# Patient Record
Sex: FEMALE | Race: WHITE | NOT HISPANIC OR LATINO | URBAN - METROPOLITAN AREA
[De-identification: names, ages, dates, MRNs, and addresses within clinical notes are randomized per-mention and may not be internally consistent; named-entity substitution may affect disease eponyms.]

---

## 2017-01-15 ENCOUNTER — COMMUNICATION - HEALTHEAST (OUTPATIENT)
Dept: INTERNAL MEDICINE | Facility: CLINIC | Age: 82
End: 2017-01-15

## 2017-01-17 ENCOUNTER — COMMUNICATION - HEALTHEAST (OUTPATIENT)
Dept: INTERNAL MEDICINE | Facility: CLINIC | Age: 82
End: 2017-01-17

## 2017-01-30 ENCOUNTER — OFFICE VISIT - HEALTHEAST (OUTPATIENT)
Dept: INTERNAL MEDICINE | Facility: CLINIC | Age: 82
End: 2017-01-30

## 2017-01-30 DIAGNOSIS — I10 ESSENTIAL HYPERTENSION WITH GOAL BLOOD PRESSURE LESS THAN 140/90: ICD-10-CM

## 2017-01-30 DIAGNOSIS — D50.0 IRON DEFICIENCY ANEMIA DUE TO CHRONIC BLOOD LOSS: ICD-10-CM

## 2017-01-30 DIAGNOSIS — M79.7 FIBROMYALGIA: ICD-10-CM

## 2017-01-30 DIAGNOSIS — I48.91 ATRIAL FIBRILLATION WITH RVR (H): ICD-10-CM

## 2017-01-30 ASSESSMENT — MIFFLIN-ST. JEOR: SCORE: 967.47

## 2017-01-31 ENCOUNTER — COMMUNICATION - HEALTHEAST (OUTPATIENT)
Dept: INTERNAL MEDICINE | Facility: CLINIC | Age: 82
End: 2017-01-31

## 2017-02-14 ENCOUNTER — COMMUNICATION - HEALTHEAST (OUTPATIENT)
Dept: INTERNAL MEDICINE | Facility: CLINIC | Age: 82
End: 2017-02-14

## 2017-02-14 ENCOUNTER — AMBULATORY - HEALTHEAST (OUTPATIENT)
Dept: INTERNAL MEDICINE | Facility: CLINIC | Age: 82
End: 2017-02-14

## 2017-03-08 ENCOUNTER — COMMUNICATION - HEALTHEAST (OUTPATIENT)
Dept: INTERNAL MEDICINE | Facility: CLINIC | Age: 82
End: 2017-03-08

## 2017-03-20 ENCOUNTER — COMMUNICATION - HEALTHEAST (OUTPATIENT)
Dept: INTERNAL MEDICINE | Facility: CLINIC | Age: 82
End: 2017-03-20

## 2017-03-22 ENCOUNTER — COMMUNICATION - HEALTHEAST (OUTPATIENT)
Dept: INTERNAL MEDICINE | Facility: CLINIC | Age: 82
End: 2017-03-22

## 2017-03-22 ENCOUNTER — AMBULATORY - HEALTHEAST (OUTPATIENT)
Dept: INTERNAL MEDICINE | Facility: CLINIC | Age: 82
End: 2017-03-22

## 2017-03-28 ENCOUNTER — COMMUNICATION - HEALTHEAST (OUTPATIENT)
Dept: INTERNAL MEDICINE | Facility: CLINIC | Age: 82
End: 2017-03-28

## 2017-03-29 ENCOUNTER — OFFICE VISIT - HEALTHEAST (OUTPATIENT)
Dept: INTERNAL MEDICINE | Facility: CLINIC | Age: 82
End: 2017-03-29

## 2017-03-29 DIAGNOSIS — D50.0 IRON DEFICIENCY ANEMIA DUE TO CHRONIC BLOOD LOSS: ICD-10-CM

## 2017-03-29 DIAGNOSIS — I10 ESSENTIAL HYPERTENSION WITH GOAL BLOOD PRESSURE LESS THAN 140/90: ICD-10-CM

## 2017-03-29 DIAGNOSIS — K55.20 AV MALFORMATION OF GI TRACT: ICD-10-CM

## 2017-03-29 ASSESSMENT — MIFFLIN-ST. JEOR: SCORE: 972

## 2017-04-25 ENCOUNTER — COMMUNICATION - HEALTHEAST (OUTPATIENT)
Dept: INTERNAL MEDICINE | Facility: CLINIC | Age: 82
End: 2017-04-25

## 2017-04-25 ENCOUNTER — OFFICE VISIT - HEALTHEAST (OUTPATIENT)
Dept: INTERNAL MEDICINE | Facility: CLINIC | Age: 82
End: 2017-04-25

## 2017-04-25 DIAGNOSIS — M79.7 FIBROMYALGIA: ICD-10-CM

## 2017-04-25 DIAGNOSIS — J44.9 COPD (CHRONIC OBSTRUCTIVE PULMONARY DISEASE) (H): ICD-10-CM

## 2017-04-25 DIAGNOSIS — I10 ESSENTIAL HYPERTENSION WITH GOAL BLOOD PRESSURE LESS THAN 140/90: ICD-10-CM

## 2017-04-25 DIAGNOSIS — J44.9 CHRONIC OBSTRUCTIVE PULMONARY DISEASE, UNSPECIFIED COPD TYPE (H): ICD-10-CM

## 2017-04-25 DIAGNOSIS — K92.2 GASTROINTESTINAL HEMORRHAGE, UNSPECIFIED GASTROINTESTINAL HEMORRHAGE TYPE: ICD-10-CM

## 2017-04-25 ASSESSMENT — MIFFLIN-ST. JEOR: SCORE: 976.54

## 2017-04-27 ENCOUNTER — COMMUNICATION - HEALTHEAST (OUTPATIENT)
Dept: INTERNAL MEDICINE | Facility: CLINIC | Age: 82
End: 2017-04-27

## 2017-05-18 ENCOUNTER — COMMUNICATION - HEALTHEAST (OUTPATIENT)
Dept: INTERNAL MEDICINE | Facility: CLINIC | Age: 82
End: 2017-05-18

## 2017-05-30 ENCOUNTER — OFFICE VISIT - HEALTHEAST (OUTPATIENT)
Dept: INTERNAL MEDICINE | Facility: CLINIC | Age: 82
End: 2017-05-30

## 2017-05-30 DIAGNOSIS — D64.9 ANEMIA: ICD-10-CM

## 2017-05-30 DIAGNOSIS — J44.9 CHRONIC OBSTRUCTIVE PULMONARY DISEASE, UNSPECIFIED COPD TYPE (H): ICD-10-CM

## 2017-05-30 DIAGNOSIS — I10 ESSENTIAL HYPERTENSION WITH GOAL BLOOD PRESSURE LESS THAN 140/90: ICD-10-CM

## 2017-05-30 ASSESSMENT — MIFFLIN-ST. JEOR: SCORE: 976.54

## 2017-06-14 ENCOUNTER — COMMUNICATION - HEALTHEAST (OUTPATIENT)
Dept: INTERNAL MEDICINE | Facility: CLINIC | Age: 82
End: 2017-06-14

## 2017-06-14 DIAGNOSIS — M81.0 OSTEOPOROSIS: ICD-10-CM

## 2017-06-21 ENCOUNTER — RECORDS - HEALTHEAST (OUTPATIENT)
Dept: BONE DENSITY | Facility: CLINIC | Age: 82
End: 2017-06-21

## 2017-06-21 DIAGNOSIS — M81.0 AGE-RELATED OSTEOPOROSIS WITHOUT CURRENT PATHOLOGICAL FRACTURE: ICD-10-CM

## 2017-06-22 ENCOUNTER — RECORDS - HEALTHEAST (OUTPATIENT)
Dept: ADMINISTRATIVE | Facility: OTHER | Age: 82
End: 2017-06-22

## 2017-07-05 ENCOUNTER — COMMUNICATION - HEALTHEAST (OUTPATIENT)
Dept: INTERNAL MEDICINE | Facility: CLINIC | Age: 82
End: 2017-07-05

## 2017-07-13 ENCOUNTER — COMMUNICATION - HEALTHEAST (OUTPATIENT)
Dept: INTERNAL MEDICINE | Facility: CLINIC | Age: 82
End: 2017-07-13

## 2017-08-01 ENCOUNTER — OFFICE VISIT - HEALTHEAST (OUTPATIENT)
Dept: INTERNAL MEDICINE | Facility: CLINIC | Age: 82
End: 2017-08-01

## 2017-08-01 DIAGNOSIS — M79.7 FIBROMYALGIA: ICD-10-CM

## 2017-08-01 DIAGNOSIS — K55.20 AV MALFORMATION OF GI TRACT: ICD-10-CM

## 2017-08-01 DIAGNOSIS — I25.10 ASHD (ARTERIOSCLEROTIC HEART DISEASE): ICD-10-CM

## 2017-08-01 DIAGNOSIS — M81.0 OSTEOPOROSIS: ICD-10-CM

## 2017-08-01 LAB
CHOLEST SERPL-MCNC: 180 MG/DL
FASTING STATUS PATIENT QL REPORTED: YES
HDLC SERPL-MCNC: 75 MG/DL
LDLC SERPL CALC-MCNC: 88 MG/DL
TRIGL SERPL-MCNC: 84 MG/DL

## 2017-08-01 ASSESSMENT — MIFFLIN-ST. JEOR: SCORE: 958.39

## 2017-08-02 ENCOUNTER — COMMUNICATION - HEALTHEAST (OUTPATIENT)
Dept: INTERNAL MEDICINE | Facility: CLINIC | Age: 82
End: 2017-08-02

## 2017-08-21 ENCOUNTER — RECORDS - HEALTHEAST (OUTPATIENT)
Dept: ADMINISTRATIVE | Facility: OTHER | Age: 82
End: 2017-08-21

## 2017-10-03 ENCOUNTER — OFFICE VISIT - HEALTHEAST (OUTPATIENT)
Dept: INTERNAL MEDICINE | Facility: CLINIC | Age: 82
End: 2017-10-03

## 2017-10-03 DIAGNOSIS — Z23 NEED FOR VACCINATION: ICD-10-CM

## 2017-10-03 DIAGNOSIS — M79.7 FIBROMYALGIA: ICD-10-CM

## 2017-10-03 DIAGNOSIS — D64.9 ANEMIA: ICD-10-CM

## 2017-10-03 DIAGNOSIS — D50.0 IRON DEFICIENCY ANEMIA DUE TO CHRONIC BLOOD LOSS: ICD-10-CM

## 2017-10-03 DIAGNOSIS — J44.9 CHRONIC OBSTRUCTIVE PULMONARY DISEASE, UNSPECIFIED COPD TYPE (H): ICD-10-CM

## 2017-10-03 DIAGNOSIS — I25.10 CORONARY ATHEROSCLEROSIS: ICD-10-CM

## 2017-10-03 DIAGNOSIS — Z79.899 MEDICATION MANAGEMENT: ICD-10-CM

## 2017-10-03 ASSESSMENT — MIFFLIN-ST. JEOR: SCORE: 949.32

## 2017-10-04 ENCOUNTER — COMMUNICATION - HEALTHEAST (OUTPATIENT)
Dept: INTERNAL MEDICINE | Facility: CLINIC | Age: 82
End: 2017-10-04

## 2017-10-12 ENCOUNTER — COMMUNICATION - HEALTHEAST (OUTPATIENT)
Dept: INTERNAL MEDICINE | Facility: CLINIC | Age: 82
End: 2017-10-12

## 2017-10-12 DIAGNOSIS — M79.7 FIBROMYALGIA: ICD-10-CM

## 2017-10-25 ENCOUNTER — COMMUNICATION - HEALTHEAST (OUTPATIENT)
Dept: INTERNAL MEDICINE | Facility: CLINIC | Age: 82
End: 2017-10-25

## 2017-10-25 DIAGNOSIS — J44.9 CHRONIC OBSTRUCTIVE PULMONARY DISEASE, UNSPECIFIED COPD TYPE (H): ICD-10-CM

## 2017-11-28 ENCOUNTER — COMMUNICATION - HEALTHEAST (OUTPATIENT)
Dept: INTERNAL MEDICINE | Facility: CLINIC | Age: 82
End: 2017-11-28

## 2017-11-29 ENCOUNTER — AMBULATORY - HEALTHEAST (OUTPATIENT)
Dept: INTERNAL MEDICINE | Facility: CLINIC | Age: 82
End: 2017-11-29

## 2017-11-29 DIAGNOSIS — M79.7 FIBROMYALGIA: ICD-10-CM

## 2018-01-09 ENCOUNTER — OFFICE VISIT - HEALTHEAST (OUTPATIENT)
Dept: INTERNAL MEDICINE | Facility: CLINIC | Age: 83
End: 2018-01-09

## 2018-01-09 DIAGNOSIS — E03.4 HYPOTHYROIDISM DUE TO ACQUIRED ATROPHY OF THYROID: ICD-10-CM

## 2018-01-09 DIAGNOSIS — M79.7 FIBROMYALGIA: ICD-10-CM

## 2018-01-09 DIAGNOSIS — D50.0 IRON DEFICIENCY ANEMIA DUE TO CHRONIC BLOOD LOSS: ICD-10-CM

## 2018-01-09 DIAGNOSIS — I25.10 CORONARY ATHEROSCLEROSIS: ICD-10-CM

## 2018-01-09 DIAGNOSIS — I10 ESSENTIAL HYPERTENSION: ICD-10-CM

## 2018-01-09 LAB
ANION GAP SERPL CALCULATED.3IONS-SCNC: 13 MMOL/L (ref 5–18)
BUN SERPL-MCNC: 22 MG/DL (ref 8–28)
CALCIUM SERPL-MCNC: 9.6 MG/DL (ref 8.5–10.5)
CHLORIDE BLD-SCNC: 98 MMOL/L (ref 98–107)
CO2 SERPL-SCNC: 28 MMOL/L (ref 22–31)
CREAT SERPL-MCNC: 0.81 MG/DL (ref 0.6–1.1)
ERYTHROCYTE [DISTWIDTH] IN BLOOD BY AUTOMATED COUNT: 11.7 % (ref 11–14.5)
GFR SERPL CREATININE-BSD FRML MDRD: >60 ML/MIN/1.73M2
GLUCOSE BLD-MCNC: 111 MG/DL (ref 70–125)
HCT VFR BLD AUTO: 38 % (ref 35–47)
HGB BLD-MCNC: 12.8 G/DL (ref 12–16)
MCH RBC QN AUTO: 32.5 PG (ref 27–34)
MCHC RBC AUTO-ENTMCNC: 33.8 G/DL (ref 32–36)
MCV RBC AUTO: 96 FL (ref 80–100)
PLATELET # BLD AUTO: 225 THOU/UL (ref 140–440)
PMV BLD AUTO: 7 FL (ref 7–10)
POTASSIUM BLD-SCNC: 3.9 MMOL/L (ref 3.5–5)
RBC # BLD AUTO: 3.96 MILL/UL (ref 3.8–5.4)
SODIUM SERPL-SCNC: 139 MMOL/L (ref 136–145)
TSH SERPL DL<=0.005 MIU/L-ACNC: 6.68 UIU/ML (ref 0.3–5)
WBC: 6.9 THOU/UL (ref 4–11)

## 2018-01-09 ASSESSMENT — MIFFLIN-ST. JEOR: SCORE: 976.54

## 2018-01-10 ENCOUNTER — AMBULATORY - HEALTHEAST (OUTPATIENT)
Dept: INTERNAL MEDICINE | Facility: CLINIC | Age: 83
End: 2018-01-10

## 2018-01-10 ENCOUNTER — COMMUNICATION - HEALTHEAST (OUTPATIENT)
Dept: INTERNAL MEDICINE | Facility: CLINIC | Age: 83
End: 2018-01-10

## 2018-01-16 ENCOUNTER — COMMUNICATION - HEALTHEAST (OUTPATIENT)
Dept: INTERNAL MEDICINE | Facility: CLINIC | Age: 83
End: 2018-01-16

## 2018-01-16 DIAGNOSIS — K25.9 GASTRIC ULCER: ICD-10-CM

## 2018-02-07 ENCOUNTER — COMMUNICATION - HEALTHEAST (OUTPATIENT)
Dept: INTERNAL MEDICINE | Facility: CLINIC | Age: 83
End: 2018-02-07

## 2018-02-07 DIAGNOSIS — E78.5 HYPERLIPIDEMIA: ICD-10-CM

## 2018-02-07 DIAGNOSIS — J44.9 CHRONIC OBSTRUCTIVE PULMONARY DISEASE, UNSPECIFIED COPD TYPE (H): ICD-10-CM

## 2018-02-09 ENCOUNTER — COMMUNICATION - HEALTHEAST (OUTPATIENT)
Dept: SCHEDULING | Facility: CLINIC | Age: 83
End: 2018-02-09

## 2018-02-09 DIAGNOSIS — I10 HTN (HYPERTENSION): ICD-10-CM

## 2018-02-20 ENCOUNTER — COMMUNICATION - HEALTHEAST (OUTPATIENT)
Dept: INTERNAL MEDICINE | Facility: CLINIC | Age: 83
End: 2018-02-20

## 2018-02-20 DIAGNOSIS — M79.7 FIBROMYALGIA: ICD-10-CM

## 2018-03-01 ENCOUNTER — COMMUNICATION - HEALTHEAST (OUTPATIENT)
Dept: INTERNAL MEDICINE | Facility: CLINIC | Age: 83
End: 2018-03-01

## 2018-03-06 ENCOUNTER — COMMUNICATION - HEALTHEAST (OUTPATIENT)
Dept: INTERNAL MEDICINE | Facility: CLINIC | Age: 83
End: 2018-03-06

## 2018-03-06 DIAGNOSIS — J44.9 CHRONIC OBSTRUCTIVE PULMONARY DISEASE, UNSPECIFIED COPD TYPE (H): ICD-10-CM

## 2018-03-28 ENCOUNTER — COMMUNICATION - HEALTHEAST (OUTPATIENT)
Dept: INTERNAL MEDICINE | Facility: CLINIC | Age: 83
End: 2018-03-28

## 2018-03-28 DIAGNOSIS — M79.7 FIBROMYALGIA: ICD-10-CM

## 2018-03-29 ENCOUNTER — COMMUNICATION - HEALTHEAST (OUTPATIENT)
Dept: INTERNAL MEDICINE | Facility: CLINIC | Age: 83
End: 2018-03-29

## 2018-04-10 ENCOUNTER — OFFICE VISIT - HEALTHEAST (OUTPATIENT)
Dept: INTERNAL MEDICINE | Facility: CLINIC | Age: 83
End: 2018-04-10

## 2018-04-10 DIAGNOSIS — J44.9 CHRONIC OBSTRUCTIVE PULMONARY DISEASE, UNSPECIFIED COPD TYPE (H): ICD-10-CM

## 2018-04-10 DIAGNOSIS — E78.2 MIXED HYPERLIPIDEMIA: ICD-10-CM

## 2018-04-10 DIAGNOSIS — M19.90 OSTEOARTHRITIS: ICD-10-CM

## 2018-04-10 DIAGNOSIS — I10 ESSENTIAL HYPERTENSION: ICD-10-CM

## 2018-04-10 LAB
ANION GAP SERPL CALCULATED.3IONS-SCNC: 13 MMOL/L (ref 5–18)
BASOPHILS # BLD AUTO: 0 THOU/UL (ref 0–0.2)
BASOPHILS NFR BLD AUTO: 0 % (ref 0–2)
BUN SERPL-MCNC: 22 MG/DL (ref 8–28)
CALCIUM SERPL-MCNC: 9.5 MG/DL (ref 8.5–10.5)
CHLORIDE BLD-SCNC: 94 MMOL/L (ref 98–107)
CHOLEST SERPL-MCNC: 160 MG/DL
CO2 SERPL-SCNC: 28 MMOL/L (ref 22–31)
CREAT SERPL-MCNC: 0.84 MG/DL (ref 0.6–1.1)
EOSINOPHIL # BLD AUTO: 0.1 THOU/UL (ref 0–0.4)
EOSINOPHIL NFR BLD AUTO: 2 % (ref 0–6)
ERYTHROCYTE [DISTWIDTH] IN BLOOD BY AUTOMATED COUNT: 12 % (ref 11–14.5)
FASTING STATUS PATIENT QL REPORTED: YES
GFR SERPL CREATININE-BSD FRML MDRD: >60 ML/MIN/1.73M2
GLUCOSE BLD-MCNC: 103 MG/DL (ref 70–125)
HCT VFR BLD AUTO: 35.4 % (ref 35–47)
HDLC SERPL-MCNC: 72 MG/DL
HGB BLD-MCNC: 11.9 G/DL (ref 12–16)
LDLC SERPL CALC-MCNC: 70 MG/DL
LYMPHOCYTES # BLD AUTO: 1.4 THOU/UL (ref 0.8–4.4)
LYMPHOCYTES NFR BLD AUTO: 23 % (ref 20–40)
MCH RBC QN AUTO: 31.6 PG (ref 27–34)
MCHC RBC AUTO-ENTMCNC: 33.6 G/DL (ref 32–36)
MCV RBC AUTO: 94 FL (ref 80–100)
MONOCYTES # BLD AUTO: 0.7 THOU/UL (ref 0–0.9)
MONOCYTES NFR BLD AUTO: 11 % (ref 2–10)
NEUTROPHILS # BLD AUTO: 3.9 THOU/UL (ref 2–7.7)
NEUTROPHILS NFR BLD AUTO: 64 % (ref 50–70)
PLATELET # BLD AUTO: 235 THOU/UL (ref 140–440)
PMV BLD AUTO: 7.2 FL (ref 7–10)
POTASSIUM BLD-SCNC: 3.8 MMOL/L (ref 3.5–5)
RBC # BLD AUTO: 3.76 MILL/UL (ref 3.8–5.4)
SODIUM SERPL-SCNC: 135 MMOL/L (ref 136–145)
TRIGL SERPL-MCNC: 92 MG/DL
WBC: 6.1 THOU/UL (ref 4–11)

## 2018-04-10 ASSESSMENT — MIFFLIN-ST. JEOR: SCORE: 972

## 2018-04-11 ENCOUNTER — COMMUNICATION - HEALTHEAST (OUTPATIENT)
Dept: INTERNAL MEDICINE | Facility: CLINIC | Age: 83
End: 2018-04-11

## 2018-05-08 ENCOUNTER — COMMUNICATION - HEALTHEAST (OUTPATIENT)
Dept: INTERNAL MEDICINE | Facility: CLINIC | Age: 83
End: 2018-05-08

## 2018-05-08 DIAGNOSIS — J44.9 CHRONIC OBSTRUCTIVE PULMONARY DISEASE, UNSPECIFIED COPD TYPE (H): ICD-10-CM

## 2018-05-10 ENCOUNTER — COMMUNICATION - HEALTHEAST (OUTPATIENT)
Dept: INTERNAL MEDICINE | Facility: CLINIC | Age: 83
End: 2018-05-10

## 2018-05-10 DIAGNOSIS — I10 HTN (HYPERTENSION): ICD-10-CM

## 2018-05-10 DIAGNOSIS — E78.5 HYPERLIPIDEMIA: ICD-10-CM

## 2018-06-18 ENCOUNTER — COMMUNICATION - HEALTHEAST (OUTPATIENT)
Dept: INTERNAL MEDICINE | Facility: CLINIC | Age: 83
End: 2018-06-18

## 2018-06-18 DIAGNOSIS — M79.7 FIBROMYALGIA: ICD-10-CM

## 2018-07-17 ENCOUNTER — OFFICE VISIT - HEALTHEAST (OUTPATIENT)
Dept: INTERNAL MEDICINE | Facility: CLINIC | Age: 83
End: 2018-07-17

## 2018-07-17 DIAGNOSIS — D50.0 IRON DEFICIENCY ANEMIA DUE TO CHRONIC BLOOD LOSS: ICD-10-CM

## 2018-07-17 DIAGNOSIS — E03.4 HYPOTHYROIDISM DUE TO ACQUIRED ATROPHY OF THYROID: ICD-10-CM

## 2018-07-17 DIAGNOSIS — K55.20 AV MALFORMATION OF GI TRACT: ICD-10-CM

## 2018-07-17 DIAGNOSIS — E78.2 MIXED HYPERLIPIDEMIA: ICD-10-CM

## 2018-07-17 DIAGNOSIS — I25.10 CORONARY ATHEROSCLEROSIS: ICD-10-CM

## 2018-07-17 DIAGNOSIS — I10 ESSENTIAL HYPERTENSION: ICD-10-CM

## 2018-07-17 DIAGNOSIS — M79.7 FIBROMYALGIA: ICD-10-CM

## 2018-07-17 DIAGNOSIS — J44.9 CHRONIC OBSTRUCTIVE PULMONARY DISEASE, UNSPECIFIED COPD TYPE (H): ICD-10-CM

## 2018-07-17 LAB
CHOLEST SERPL-MCNC: 161 MG/DL
ERYTHROCYTE [DISTWIDTH] IN BLOOD BY AUTOMATED COUNT: 11.7 % (ref 11–14.5)
FASTING STATUS PATIENT QL REPORTED: YES
HCT VFR BLD AUTO: 40.7 % (ref 35–47)
HDLC SERPL-MCNC: 71 MG/DL
HGB BLD-MCNC: 13.7 G/DL (ref 12–16)
LDLC SERPL CALC-MCNC: 69 MG/DL
MCH RBC QN AUTO: 32.4 PG (ref 27–34)
MCHC RBC AUTO-ENTMCNC: 33.7 G/DL (ref 32–36)
MCV RBC AUTO: 96 FL (ref 80–100)
PLATELET # BLD AUTO: 264 THOU/UL (ref 140–440)
PMV BLD AUTO: 7.3 FL (ref 7–10)
RBC # BLD AUTO: 4.23 MILL/UL (ref 3.8–5.4)
TRIGL SERPL-MCNC: 103 MG/DL
TSH SERPL DL<=0.005 MIU/L-ACNC: 0.98 UIU/ML (ref 0.3–5)
WBC: 6.4 THOU/UL (ref 4–11)

## 2018-07-17 ASSESSMENT — MIFFLIN-ST. JEOR: SCORE: 958.39

## 2018-07-18 ENCOUNTER — COMMUNICATION - HEALTHEAST (OUTPATIENT)
Dept: INTERNAL MEDICINE | Facility: CLINIC | Age: 83
End: 2018-07-18

## 2018-07-31 ENCOUNTER — COMMUNICATION - HEALTHEAST (OUTPATIENT)
Dept: INTERNAL MEDICINE | Facility: CLINIC | Age: 83
End: 2018-07-31

## 2018-08-07 ENCOUNTER — COMMUNICATION - HEALTHEAST (OUTPATIENT)
Dept: INTERNAL MEDICINE | Facility: CLINIC | Age: 83
End: 2018-08-07

## 2018-08-07 DIAGNOSIS — I10 ESSENTIAL HYPERTENSION: ICD-10-CM

## 2018-08-08 ENCOUNTER — COMMUNICATION - HEALTHEAST (OUTPATIENT)
Dept: INTERNAL MEDICINE | Facility: CLINIC | Age: 83
End: 2018-08-08

## 2018-08-15 ENCOUNTER — COMMUNICATION - HEALTHEAST (OUTPATIENT)
Dept: INTERNAL MEDICINE | Facility: CLINIC | Age: 83
End: 2018-08-15

## 2018-08-15 DIAGNOSIS — M79.7 FIBROMYALGIA: ICD-10-CM

## 2018-09-04 ENCOUNTER — COMMUNICATION - HEALTHEAST (OUTPATIENT)
Dept: INTERNAL MEDICINE | Facility: CLINIC | Age: 83
End: 2018-09-04

## 2018-09-04 DIAGNOSIS — I10 ESSENTIAL HYPERTENSION: ICD-10-CM

## 2018-09-04 DIAGNOSIS — J44.9 CHRONIC OBSTRUCTIVE PULMONARY DISEASE, UNSPECIFIED COPD TYPE (H): ICD-10-CM

## 2018-10-02 ENCOUNTER — COMMUNICATION - HEALTHEAST (OUTPATIENT)
Dept: INTERNAL MEDICINE | Facility: CLINIC | Age: 83
End: 2018-10-02

## 2018-10-16 ENCOUNTER — OFFICE VISIT - HEALTHEAST (OUTPATIENT)
Dept: INTERNAL MEDICINE | Facility: CLINIC | Age: 83
End: 2018-10-16

## 2018-10-16 DIAGNOSIS — F43.23 ADJUSTMENT DISORDER WITH MIXED ANXIETY AND DEPRESSED MOOD: ICD-10-CM

## 2018-10-16 DIAGNOSIS — M79.7 FIBROMYALGIA: ICD-10-CM

## 2018-10-16 DIAGNOSIS — Z23 NEED FOR VACCINATION: ICD-10-CM

## 2018-10-16 DIAGNOSIS — I10 ESSENTIAL HYPERTENSION: ICD-10-CM

## 2018-10-16 DIAGNOSIS — D50.0 IRON DEFICIENCY ANEMIA DUE TO CHRONIC BLOOD LOSS: ICD-10-CM

## 2018-10-16 LAB
ANION GAP SERPL CALCULATED.3IONS-SCNC: 15 MMOL/L (ref 5–18)
BASOPHILS # BLD AUTO: 0 THOU/UL (ref 0–0.2)
BASOPHILS NFR BLD AUTO: 1 % (ref 0–2)
BUN SERPL-MCNC: 24 MG/DL (ref 8–28)
CALCIUM SERPL-MCNC: 9.7 MG/DL (ref 8.5–10.5)
CHLORIDE BLD-SCNC: 98 MMOL/L (ref 98–107)
CO2 SERPL-SCNC: 28 MMOL/L (ref 22–31)
CREAT SERPL-MCNC: 0.95 MG/DL (ref 0.6–1.1)
EOSINOPHIL # BLD AUTO: 0.2 THOU/UL (ref 0–0.4)
EOSINOPHIL NFR BLD AUTO: 2 % (ref 0–6)
ERYTHROCYTE [DISTWIDTH] IN BLOOD BY AUTOMATED COUNT: 12.1 % (ref 11–14.5)
GFR SERPL CREATININE-BSD FRML MDRD: 56 ML/MIN/1.73M2
GLUCOSE BLD-MCNC: 116 MG/DL (ref 70–125)
HCT VFR BLD AUTO: 40 % (ref 35–47)
HGB BLD-MCNC: 13.3 G/DL (ref 12–16)
LYMPHOCYTES # BLD AUTO: 1.7 THOU/UL (ref 0.8–4.4)
LYMPHOCYTES NFR BLD AUTO: 24 % (ref 20–40)
MCH RBC QN AUTO: 32.5 PG (ref 27–34)
MCHC RBC AUTO-ENTMCNC: 33.2 G/DL (ref 32–36)
MCV RBC AUTO: 98 FL (ref 80–100)
MONOCYTES # BLD AUTO: 0.7 THOU/UL (ref 0–0.9)
MONOCYTES NFR BLD AUTO: 10 % (ref 2–10)
NEUTROPHILS # BLD AUTO: 4.4 THOU/UL (ref 2–7.7)
NEUTROPHILS NFR BLD AUTO: 63 % (ref 50–70)
PLATELET # BLD AUTO: 273 THOU/UL (ref 140–440)
PMV BLD AUTO: 7.7 FL (ref 7–10)
POTASSIUM BLD-SCNC: 3.1 MMOL/L (ref 3.5–5)
RBC # BLD AUTO: 4.09 MILL/UL (ref 3.8–5.4)
SODIUM SERPL-SCNC: 141 MMOL/L (ref 136–145)
WBC: 6.9 THOU/UL (ref 4–11)

## 2018-10-16 ASSESSMENT — MIFFLIN-ST. JEOR: SCORE: 962.93

## 2018-10-18 ENCOUNTER — COMMUNICATION - HEALTHEAST (OUTPATIENT)
Dept: INTERNAL MEDICINE | Facility: CLINIC | Age: 83
End: 2018-10-18

## 2018-10-18 ENCOUNTER — AMBULATORY - HEALTHEAST (OUTPATIENT)
Dept: INTERNAL MEDICINE | Facility: CLINIC | Age: 83
End: 2018-10-18

## 2018-10-19 ENCOUNTER — COMMUNICATION - HEALTHEAST (OUTPATIENT)
Dept: INTERNAL MEDICINE | Facility: CLINIC | Age: 83
End: 2018-10-19

## 2018-10-30 ENCOUNTER — OFFICE VISIT - HEALTHEAST (OUTPATIENT)
Dept: INTERNAL MEDICINE | Facility: CLINIC | Age: 83
End: 2018-10-30

## 2018-10-30 ENCOUNTER — COMMUNICATION - HEALTHEAST (OUTPATIENT)
Dept: INTERNAL MEDICINE | Facility: CLINIC | Age: 83
End: 2018-10-30

## 2018-10-30 DIAGNOSIS — F41.9 ANXIETY: ICD-10-CM

## 2018-10-30 DIAGNOSIS — I48.91 ATRIAL FIBRILLATION WITH RVR (H): ICD-10-CM

## 2018-10-30 DIAGNOSIS — M79.7 FIBROMYALGIA: ICD-10-CM

## 2018-10-30 DIAGNOSIS — K25.9 GASTRIC ULCER: ICD-10-CM

## 2018-10-30 LAB
ANION GAP SERPL CALCULATED.3IONS-SCNC: 16 MMOL/L (ref 5–18)
ATRIAL RATE - MUSE: 144 BPM
BUN SERPL-MCNC: 28 MG/DL (ref 8–28)
CALCIUM SERPL-MCNC: 10.3 MG/DL (ref 8.5–10.5)
CHLORIDE BLD-SCNC: 102 MMOL/L (ref 98–107)
CO2 SERPL-SCNC: 25 MMOL/L (ref 22–31)
CREAT SERPL-MCNC: 0.95 MG/DL (ref 0.6–1.1)
DIASTOLIC BLOOD PRESSURE - MUSE: NORMAL MMHG
ERYTHROCYTE [DISTWIDTH] IN BLOOD BY AUTOMATED COUNT: 11.7 % (ref 11–14.5)
GFR SERPL CREATININE-BSD FRML MDRD: 56 ML/MIN/1.73M2
GLUCOSE BLD-MCNC: 108 MG/DL (ref 70–125)
HCT VFR BLD AUTO: 39 % (ref 35–47)
HGB BLD-MCNC: 12.9 G/DL (ref 12–16)
INTERPRETATION ECG - MUSE: NORMAL
MCH RBC QN AUTO: 32.5 PG (ref 27–34)
MCHC RBC AUTO-ENTMCNC: 33.2 G/DL (ref 32–36)
MCV RBC AUTO: 98 FL (ref 80–100)
P AXIS - MUSE: NORMAL DEGREES
PLATELET # BLD AUTO: 208 THOU/UL (ref 140–440)
PMV BLD AUTO: 7.6 FL (ref 7–10)
POTASSIUM BLD-SCNC: 3.4 MMOL/L (ref 3.5–5)
PR INTERVAL - MUSE: NORMAL MS
QRS DURATION - MUSE: 100 MS
QT - MUSE: 346 MS
QTC - MUSE: 493 MS
R AXIS - MUSE: 4 DEGREES
RBC # BLD AUTO: 3.98 MILL/UL (ref 3.8–5.4)
SODIUM SERPL-SCNC: 143 MMOL/L (ref 136–145)
SYSTOLIC BLOOD PRESSURE - MUSE: NORMAL MMHG
T AXIS - MUSE: 10 DEGREES
TSH SERPL DL<=0.005 MIU/L-ACNC: 0.74 UIU/ML (ref 0.3–5)
VENTRICULAR RATE- MUSE: 122 BPM
WBC: 6.5 THOU/UL (ref 4–11)

## 2018-10-30 ASSESSMENT — MIFFLIN-ST. JEOR: SCORE: 963.47

## 2018-10-31 ENCOUNTER — AMBULATORY - HEALTHEAST (OUTPATIENT)
Dept: INTERNAL MEDICINE | Facility: CLINIC | Age: 83
End: 2018-10-31

## 2018-10-31 ENCOUNTER — COMMUNICATION - HEALTHEAST (OUTPATIENT)
Dept: SCHEDULING | Facility: CLINIC | Age: 83
End: 2018-10-31

## 2018-10-31 ENCOUNTER — COMMUNICATION - HEALTHEAST (OUTPATIENT)
Dept: INTERNAL MEDICINE | Facility: CLINIC | Age: 83
End: 2018-10-31

## 2018-10-31 DIAGNOSIS — E78.5 HYPERLIPIDEMIA: ICD-10-CM

## 2018-10-31 DIAGNOSIS — I10 HTN (HYPERTENSION): ICD-10-CM

## 2018-11-13 ENCOUNTER — OFFICE VISIT - HEALTHEAST (OUTPATIENT)
Dept: INTERNAL MEDICINE | Facility: CLINIC | Age: 83
End: 2018-11-13

## 2018-11-13 DIAGNOSIS — M79.7 FIBROMYALGIA: ICD-10-CM

## 2018-11-13 DIAGNOSIS — I48.0 PAROXYSMAL ATRIAL FIBRILLATION (H): ICD-10-CM

## 2018-11-13 DIAGNOSIS — D50.0 IRON DEFICIENCY ANEMIA DUE TO CHRONIC BLOOD LOSS: ICD-10-CM

## 2018-11-13 LAB
ANION GAP SERPL CALCULATED.3IONS-SCNC: 16 MMOL/L (ref 5–18)
BUN SERPL-MCNC: 29 MG/DL (ref 8–28)
CALCIUM SERPL-MCNC: 10.5 MG/DL (ref 8.5–10.5)
CHLORIDE BLD-SCNC: 90 MMOL/L (ref 98–107)
CO2 SERPL-SCNC: 33 MMOL/L (ref 22–31)
CREAT SERPL-MCNC: 1.22 MG/DL (ref 0.6–1.1)
ERYTHROCYTE [DISTWIDTH] IN BLOOD BY AUTOMATED COUNT: 12.1 % (ref 11–14.5)
GFR SERPL CREATININE-BSD FRML MDRD: 42 ML/MIN/1.73M2
GLUCOSE BLD-MCNC: 109 MG/DL (ref 70–125)
HCT VFR BLD AUTO: 38.5 % (ref 35–47)
HGB BLD-MCNC: 12.8 G/DL (ref 12–16)
MCH RBC QN AUTO: 32.5 PG (ref 27–34)
MCHC RBC AUTO-ENTMCNC: 33.1 G/DL (ref 32–36)
MCV RBC AUTO: 98 FL (ref 80–100)
PLATELET # BLD AUTO: 271 THOU/UL (ref 140–440)
PMV BLD AUTO: 7.4 FL (ref 7–10)
POTASSIUM BLD-SCNC: 3.5 MMOL/L (ref 3.5–5)
RBC # BLD AUTO: 3.93 MILL/UL (ref 3.8–5.4)
SODIUM SERPL-SCNC: 139 MMOL/L (ref 136–145)
WBC: 6.4 THOU/UL (ref 4–11)

## 2018-11-13 ASSESSMENT — MIFFLIN-ST. JEOR: SCORE: 958.39

## 2018-11-26 ENCOUNTER — COMMUNICATION - HEALTHEAST (OUTPATIENT)
Dept: INTERNAL MEDICINE | Facility: CLINIC | Age: 83
End: 2018-11-26

## 2018-11-27 ENCOUNTER — OFFICE VISIT - HEALTHEAST (OUTPATIENT)
Dept: INTERNAL MEDICINE | Facility: CLINIC | Age: 83
End: 2018-11-27

## 2018-11-27 DIAGNOSIS — D50.0 IRON DEFICIENCY ANEMIA DUE TO CHRONIC BLOOD LOSS: ICD-10-CM

## 2018-11-27 DIAGNOSIS — I10 ESSENTIAL HYPERTENSION: ICD-10-CM

## 2018-11-27 DIAGNOSIS — I48.0 PAROXYSMAL ATRIAL FIBRILLATION (H): ICD-10-CM

## 2018-11-27 ASSESSMENT — MIFFLIN-ST. JEOR: SCORE: 958.39

## 2018-11-29 ENCOUNTER — HOSPITAL ENCOUNTER (OUTPATIENT)
Dept: CARDIOLOGY | Facility: CLINIC | Age: 83
Discharge: HOME | End: 2018-11-29
Attending: INTERNAL MEDICINE

## 2018-11-29 DIAGNOSIS — I48.0 PAROXYSMAL ATRIAL FIBRILLATION (H): ICD-10-CM

## 2018-11-29 LAB
AORTIC ROOT: 2.4 CM
BSA FOR ECHO PROCEDURE: 1.63 M2
CV BLOOD PRESSURE: ABNORMAL MMHG
CV ECHO HEIGHT: 58 IN
CV ECHO WEIGHT: 143 LBS
DOP CALC LVOT AREA: 3.14 CM2
DOP CALC LVOT DIAMETER: 2 CM
DOP CALC LVOT PEAK VEL: 115 CM/S
DOP CALC LVOT STROKE VOLUME: 62.8 CM3
DOP CALCLVOT PEAK VEL VTI: 20 CM
EJECTION FRACTION: 59 % (ref 55–75)
FRACTIONAL SHORTENING: 31.7 % (ref 28–44)
INTERVENTRICULAR SEPTUM IN END DIASTOLE: 0.9 CM (ref 0.6–0.9)
IVS/PW RATIO: 0.6
LA AREA 1: 33 CM2
LA AREA 2: 30.1 CM2
LEFT ATRIUM LENGTH: 6.95 CM
LEFT ATRIUM SIZE: 5 CM
LEFT ATRIUM TO AORTIC ROOT RATIO: 2.08 NO UNITS
LEFT ATRIUM VOLUME INDEX: 74.5 ML/M2
LEFT ATRIUM VOLUME: 121.5 ML
LEFT VENTRICLE CARDIAC INDEX: 4.2 L/MIN/M2
LEFT VENTRICLE CARDIAC OUTPUT: 6.9 L/MIN
LEFT VENTRICLE DIASTOLIC VOLUME INDEX: 28.2 CM3/M2 (ref 29–61)
LEFT VENTRICLE DIASTOLIC VOLUME: 46 CM3 (ref 46–106)
LEFT VENTRICLE HEART RATE: 110 BPM
LEFT VENTRICLE MASS INDEX: 99 G/M2
LEFT VENTRICLE SYSTOLIC VOLUME INDEX: 11.7 CM3/M2 (ref 8–24)
LEFT VENTRICLE SYSTOLIC VOLUME: 19 CM3 (ref 14–42)
LEFT VENTRICULAR INTERNAL DIMENSION IN DIASTOLE: 4.1 CM (ref 3.8–5.2)
LEFT VENTRICULAR INTERNAL DIMENSION IN SYSTOLE: 2.8 CM (ref 2.2–3.5)
LEFT VENTRICULAR MASS: 161.4 G
LEFT VENTRICULAR OUTFLOW TRACT MEAN GRADIENT: 3 MMHG
LEFT VENTRICULAR OUTFLOW TRACT MEAN VELOCITY: 74.9 CM/S
LEFT VENTRICULAR OUTFLOW TRACT PEAK GRADIENT: 5 MMHG
LEFT VENTRICULAR POSTERIOR WALL IN END DIASTOLE: 1.4 CM (ref 0.6–0.9)
LV STROKE VOLUME INDEX: 38.5 ML/M2
MV AVERAGE E/E' RATIO: 14.6 CM/S
MV DECELERATION TIME: 204 MS
MV E'TISSUE VEL-LAT: 10.2 CM/S
MV E'TISSUE VEL-MED: 6.75 CM/S
MV LATERAL E/E' RATIO: 12.2
MV MEDIAL E/E' RATIO: 18.4
MV PEAK E VELOCITY: 124 CM/S
NUC REST DIASTOLIC VOLUME INDEX: 2288 LBS
NUC REST SYSTOLIC VOLUME INDEX: 58 IN
TRICUSPID REGURGITATION PEAK PRESSURE GRADIENT: 33.4 MMHG
TRICUSPID VALVE ANULAR PLANE SYSTOLIC EXCURSION: 1.6 CM
TRICUSPID VALVE PEAK REGURGITANT VELOCITY: 289 CM/S

## 2018-11-29 ASSESSMENT — MIFFLIN-ST. JEOR: SCORE: 958.39

## 2018-12-11 ENCOUNTER — OFFICE VISIT - HEALTHEAST (OUTPATIENT)
Dept: INTERNAL MEDICINE | Facility: CLINIC | Age: 83
End: 2018-12-11

## 2018-12-11 DIAGNOSIS — I48.19 PERSISTENT ATRIAL FIBRILLATION WITH RAPID VENTRICULAR RESPONSE (H): ICD-10-CM

## 2018-12-11 DIAGNOSIS — I10 ESSENTIAL HYPERTENSION: ICD-10-CM

## 2018-12-11 DIAGNOSIS — D50.0 IRON DEFICIENCY ANEMIA DUE TO CHRONIC BLOOD LOSS: ICD-10-CM

## 2018-12-11 LAB
ANION GAP SERPL CALCULATED.3IONS-SCNC: 15 MMOL/L (ref 5–18)
BASOPHILS # BLD AUTO: 0 THOU/UL (ref 0–0.2)
BASOPHILS NFR BLD AUTO: 0 % (ref 0–2)
BUN SERPL-MCNC: 25 MG/DL (ref 8–28)
CALCIUM SERPL-MCNC: 10.2 MG/DL (ref 8.5–10.5)
CHLORIDE BLD-SCNC: 97 MMOL/L (ref 98–107)
CO2 SERPL-SCNC: 28 MMOL/L (ref 22–31)
CREAT SERPL-MCNC: 1 MG/DL (ref 0.6–1.1)
EOSINOPHIL # BLD AUTO: 0.1 THOU/UL (ref 0–0.4)
EOSINOPHIL NFR BLD AUTO: 2 % (ref 0–6)
ERYTHROCYTE [DISTWIDTH] IN BLOOD BY AUTOMATED COUNT: 13 % (ref 11–14.5)
GFR SERPL CREATININE-BSD FRML MDRD: 52 ML/MIN/1.73M2
GLUCOSE BLD-MCNC: 112 MG/DL (ref 70–125)
HCT VFR BLD AUTO: 38.6 % (ref 35–47)
HGB BLD-MCNC: 12.7 G/DL (ref 12–16)
LYMPHOCYTES # BLD AUTO: 1.2 THOU/UL (ref 0.8–4.4)
LYMPHOCYTES NFR BLD AUTO: 19 % (ref 20–40)
MCH RBC QN AUTO: 32.6 PG (ref 27–34)
MCHC RBC AUTO-ENTMCNC: 32.9 G/DL (ref 32–36)
MCV RBC AUTO: 99 FL (ref 80–100)
MONOCYTES # BLD AUTO: 0.7 THOU/UL (ref 0–0.9)
MONOCYTES NFR BLD AUTO: 12 % (ref 2–10)
NEUTROPHILS # BLD AUTO: 4.1 THOU/UL (ref 2–7.7)
NEUTROPHILS NFR BLD AUTO: 67 % (ref 50–70)
PLATELET # BLD AUTO: 238 THOU/UL (ref 140–440)
PMV BLD AUTO: 9.3 FL (ref 8.5–12.5)
POTASSIUM BLD-SCNC: 3.8 MMOL/L (ref 3.5–5)
RBC # BLD AUTO: 3.89 MILL/UL (ref 3.8–5.4)
SODIUM SERPL-SCNC: 140 MMOL/L (ref 136–145)
WBC: 6.1 THOU/UL (ref 4–11)

## 2018-12-11 ASSESSMENT — MIFFLIN-ST. JEOR: SCORE: 967.47

## 2018-12-12 ENCOUNTER — COMMUNICATION - HEALTHEAST (OUTPATIENT)
Dept: INTERNAL MEDICINE | Facility: CLINIC | Age: 83
End: 2018-12-12

## 2018-12-13 ENCOUNTER — OFFICE VISIT - HEALTHEAST (OUTPATIENT)
Dept: CARDIOLOGY | Facility: CLINIC | Age: 83
End: 2018-12-13

## 2018-12-13 DIAGNOSIS — E78.2 MIXED HYPERLIPIDEMIA: ICD-10-CM

## 2018-12-13 DIAGNOSIS — M79.7 FIBROMYALGIA: ICD-10-CM

## 2018-12-13 DIAGNOSIS — I48.19 PERSISTENT ATRIAL FIBRILLATION (H): ICD-10-CM

## 2018-12-13 DIAGNOSIS — E03.4 HYPOTHYROIDISM DUE TO ACQUIRED ATROPHY OF THYROID: ICD-10-CM

## 2018-12-13 DIAGNOSIS — K25.9 GASTRIC ULCER: ICD-10-CM

## 2018-12-13 DIAGNOSIS — I25.10 ATHEROSCLEROSIS OF NATIVE CORONARY ARTERY OF NATIVE HEART WITHOUT ANGINA PECTORIS: ICD-10-CM

## 2018-12-13 DIAGNOSIS — J44.9 CHRONIC OBSTRUCTIVE PULMONARY DISEASE, UNSPECIFIED COPD TYPE (H): ICD-10-CM

## 2018-12-13 DIAGNOSIS — I48.91 ATRIAL FIBRILLATION WITH RVR (H): ICD-10-CM

## 2018-12-13 DIAGNOSIS — K55.20 AV MALFORMATION OF GI TRACT: ICD-10-CM

## 2018-12-13 DIAGNOSIS — I10 ESSENTIAL HYPERTENSION: ICD-10-CM

## 2018-12-13 ASSESSMENT — MIFFLIN-ST. JEOR: SCORE: 959.17

## 2018-12-14 LAB
ATRIAL RATE - MUSE: 96 BPM
DIASTOLIC BLOOD PRESSURE - MUSE: NORMAL MMHG
INTERPRETATION ECG - MUSE: NORMAL
P AXIS - MUSE: NORMAL DEGREES
PR INTERVAL - MUSE: NORMAL MS
QRS DURATION - MUSE: 112 MS
QT - MUSE: 382 MS
QTC - MUSE: 477 MS
R AXIS - MUSE: 2 DEGREES
SYSTOLIC BLOOD PRESSURE - MUSE: NORMAL MMHG
T AXIS - MUSE: 260 DEGREES
VENTRICULAR RATE- MUSE: 94 BPM

## 2018-12-18 ENCOUNTER — COMMUNICATION - HEALTHEAST (OUTPATIENT)
Dept: INTERNAL MEDICINE | Facility: CLINIC | Age: 83
End: 2018-12-18

## 2018-12-18 DIAGNOSIS — F41.9 ANXIETY: ICD-10-CM

## 2018-12-26 ENCOUNTER — OFFICE VISIT - HEALTHEAST (OUTPATIENT)
Dept: INTERNAL MEDICINE | Facility: CLINIC | Age: 83
End: 2018-12-26

## 2018-12-26 DIAGNOSIS — J44.89 COPD WITH ASTHMA (H): ICD-10-CM

## 2018-12-26 DIAGNOSIS — I48.0 PAROXYSMAL ATRIAL FIBRILLATION (H): ICD-10-CM

## 2018-12-26 DIAGNOSIS — K92.2 GASTROINTESTINAL HEMORRHAGE, UNSPECIFIED GASTROINTESTINAL HEMORRHAGE TYPE: ICD-10-CM

## 2018-12-26 DIAGNOSIS — D50.0 IRON DEFICIENCY ANEMIA DUE TO CHRONIC BLOOD LOSS: ICD-10-CM

## 2018-12-26 LAB
ANION GAP SERPL CALCULATED.3IONS-SCNC: 10 MMOL/L (ref 5–18)
BUN SERPL-MCNC: 27 MG/DL (ref 8–28)
CALCIUM SERPL-MCNC: 9.7 MG/DL (ref 8.5–10.5)
CHLORIDE BLD-SCNC: 99 MMOL/L (ref 98–107)
CO2 SERPL-SCNC: 32 MMOL/L (ref 22–31)
CREAT SERPL-MCNC: 1.1 MG/DL (ref 0.6–1.1)
ERYTHROCYTE [DISTWIDTH] IN BLOOD BY AUTOMATED COUNT: 11.9 % (ref 11–14.5)
GFR SERPL CREATININE-BSD FRML MDRD: 47 ML/MIN/1.73M2
GLUCOSE BLD-MCNC: 112 MG/DL (ref 70–125)
HCT VFR BLD AUTO: 39.6 % (ref 35–47)
HGB BLD-MCNC: 13.1 G/DL (ref 12–16)
MCH RBC QN AUTO: 32.8 PG (ref 27–34)
MCHC RBC AUTO-ENTMCNC: 33.1 G/DL (ref 32–36)
MCV RBC AUTO: 99 FL (ref 80–100)
PLATELET # BLD AUTO: 275 THOU/UL (ref 140–440)
PMV BLD AUTO: 6.9 FL (ref 7–10)
POTASSIUM BLD-SCNC: 3.6 MMOL/L (ref 3.5–5)
RBC # BLD AUTO: 4 MILL/UL (ref 3.8–5.4)
SODIUM SERPL-SCNC: 141 MMOL/L (ref 136–145)
WBC: 6.5 THOU/UL (ref 4–11)

## 2018-12-26 ASSESSMENT — MIFFLIN-ST. JEOR: SCORE: 956.13

## 2019-01-02 ENCOUNTER — HOSPITAL ENCOUNTER (OUTPATIENT)
Dept: CARDIOLOGY | Facility: CLINIC | Age: 84
Discharge: HOME | End: 2019-01-02
Attending: NURSE PRACTITIONER

## 2019-01-02 ENCOUNTER — OFFICE VISIT - HEALTHEAST (OUTPATIENT)
Dept: CARDIOLOGY | Facility: CLINIC | Age: 84
End: 2019-01-02

## 2019-01-02 DIAGNOSIS — I48.20 CHRONIC ATRIAL FIBRILLATION (H): ICD-10-CM

## 2019-01-02 DIAGNOSIS — K55.20 AV MALFORMATION OF GI TRACT: ICD-10-CM

## 2019-01-02 DIAGNOSIS — K25.4 GASTROINTESTINAL HEMORRHAGE ASSOCIATED WITH GASTRIC ULCER: ICD-10-CM

## 2019-01-02 DIAGNOSIS — I10 ESSENTIAL HYPERTENSION: ICD-10-CM

## 2019-01-02 ASSESSMENT — MIFFLIN-ST. JEOR: SCORE: 960.67

## 2019-01-11 ENCOUNTER — COMMUNICATION - HEALTHEAST (OUTPATIENT)
Dept: CARDIOLOGY | Facility: CLINIC | Age: 84
End: 2019-01-11

## 2019-01-22 ENCOUNTER — COMMUNICATION - HEALTHEAST (OUTPATIENT)
Dept: INTERNAL MEDICINE | Facility: CLINIC | Age: 84
End: 2019-01-22

## 2019-01-22 ENCOUNTER — AMBULATORY - HEALTHEAST (OUTPATIENT)
Dept: INTERNAL MEDICINE | Facility: CLINIC | Age: 84
End: 2019-01-22

## 2019-01-22 DIAGNOSIS — I10 ESSENTIAL HYPERTENSION: ICD-10-CM

## 2019-01-22 DIAGNOSIS — I48.91 ATRIAL FIBRILLATION WITH RVR (H): ICD-10-CM

## 2019-01-22 DIAGNOSIS — E03.9 HYPOTHYROIDISM: ICD-10-CM

## 2019-02-04 ENCOUNTER — OFFICE VISIT - HEALTHEAST (OUTPATIENT)
Dept: INTERNAL MEDICINE | Facility: CLINIC | Age: 84
End: 2019-02-04

## 2019-02-04 ENCOUNTER — HOSPITAL ENCOUNTER (OUTPATIENT)
Dept: LAB | Age: 84
Setting detail: SPECIMEN
Discharge: HOME | End: 2019-02-04

## 2019-02-04 DIAGNOSIS — I10 ESSENTIAL HYPERTENSION: ICD-10-CM

## 2019-02-04 DIAGNOSIS — E03.9 HYPOTHYROIDISM, UNSPECIFIED TYPE: ICD-10-CM

## 2019-02-04 DIAGNOSIS — I48.20 CHRONIC ATRIAL FIBRILLATION (H): ICD-10-CM

## 2019-02-04 DIAGNOSIS — D50.0 IRON DEFICIENCY ANEMIA DUE TO CHRONIC BLOOD LOSS: ICD-10-CM

## 2019-02-04 LAB
ANION GAP SERPL CALCULATED.3IONS-SCNC: 14 MMOL/L (ref 5–18)
BUN SERPL-MCNC: 25 MG/DL (ref 8–28)
CALCIUM SERPL-MCNC: 10.2 MG/DL (ref 8.5–10.5)
CHLORIDE BLD-SCNC: 96 MMOL/L (ref 98–107)
CO2 SERPL-SCNC: 29 MMOL/L (ref 22–31)
CREAT SERPL-MCNC: 1.24 MG/DL (ref 0.6–1.1)
GFR SERPL CREATININE-BSD FRML MDRD: 41 ML/MIN/1.73M2
GLUCOSE BLD-MCNC: 109 MG/DL (ref 70–125)
HGB BLD-MCNC: 12.9 G/DL (ref 12–16)
POTASSIUM BLD-SCNC: 4.5 MMOL/L (ref 3.5–5)
SODIUM SERPL-SCNC: 139 MMOL/L (ref 136–145)
TSH SERPL DL<=0.005 MIU/L-ACNC: 2.43 UIU/ML (ref 0.3–5)

## 2019-02-04 ASSESSMENT — MIFFLIN-ST. JEOR: SCORE: 933.45

## 2019-02-05 ENCOUNTER — AMBULATORY - HEALTHEAST (OUTPATIENT)
Dept: CARDIOLOGY | Facility: CLINIC | Age: 84
End: 2019-02-05

## 2019-02-05 ENCOUNTER — COMMUNICATION - HEALTHEAST (OUTPATIENT)
Dept: CARDIOLOGY | Facility: CLINIC | Age: 84
End: 2019-02-05

## 2019-02-05 DIAGNOSIS — I48.20 CHRONIC ATRIAL FIBRILLATION (H): ICD-10-CM

## 2019-02-06 ENCOUNTER — HOSPITAL ENCOUNTER (OUTPATIENT)
Dept: CARDIOLOGY | Facility: CLINIC | Age: 84
Discharge: HOME | End: 2019-02-06
Attending: NURSE PRACTITIONER

## 2019-02-06 DIAGNOSIS — I48.20 CHRONIC ATRIAL FIBRILLATION (H): ICD-10-CM

## 2019-02-12 ENCOUNTER — COMMUNICATION - HEALTHEAST (OUTPATIENT)
Dept: CARDIOLOGY | Facility: CLINIC | Age: 84
End: 2019-02-12

## 2019-02-12 DIAGNOSIS — I48.20 CHRONIC ATRIAL FIBRILLATION (H): ICD-10-CM

## 2019-02-19 ENCOUNTER — COMMUNICATION - HEALTHEAST (OUTPATIENT)
Dept: INTERNAL MEDICINE | Facility: CLINIC | Age: 84
End: 2019-02-19

## 2019-02-19 DIAGNOSIS — J44.9 CHRONIC OBSTRUCTIVE PULMONARY DISEASE, UNSPECIFIED COPD TYPE (H): ICD-10-CM

## 2019-03-18 ENCOUNTER — COMMUNICATION - HEALTHEAST (OUTPATIENT)
Dept: INTERNAL MEDICINE | Facility: CLINIC | Age: 84
End: 2019-03-18

## 2019-03-18 DIAGNOSIS — J44.9 CHRONIC OBSTRUCTIVE PULMONARY DISEASE, UNSPECIFIED COPD TYPE (H): ICD-10-CM

## 2019-03-19 ENCOUNTER — COMMUNICATION - HEALTHEAST (OUTPATIENT)
Dept: INTERNAL MEDICINE | Facility: CLINIC | Age: 84
End: 2019-03-19

## 2019-03-19 DIAGNOSIS — J44.9 CHRONIC OBSTRUCTIVE PULMONARY DISEASE, UNSPECIFIED COPD TYPE (H): ICD-10-CM

## 2019-03-22 ENCOUNTER — COMMUNICATION - HEALTHEAST (OUTPATIENT)
Dept: INTERNAL MEDICINE | Facility: CLINIC | Age: 84
End: 2019-03-22

## 2019-03-22 ENCOUNTER — SURGERY - HEALTHEAST (OUTPATIENT)
Dept: CARDIOLOGY | Facility: CLINIC | Age: 84
End: 2019-03-22

## 2019-03-22 ENCOUNTER — AMBULATORY - HEALTHEAST (OUTPATIENT)
Dept: CARDIOLOGY | Facility: CLINIC | Age: 84
End: 2019-03-22

## 2019-03-22 DIAGNOSIS — I48.20 CHRONIC ATRIAL FIBRILLATION (H): ICD-10-CM

## 2019-03-26 ENCOUNTER — OFFICE VISIT - HEALTHEAST (OUTPATIENT)
Dept: INTERNAL MEDICINE | Facility: CLINIC | Age: 84
End: 2019-03-26

## 2019-03-26 DIAGNOSIS — Z01.818 ENCOUNTER FOR PREOPERATIVE EXAMINATION FOR GENERAL SURGICAL PROCEDURE: ICD-10-CM

## 2019-03-26 DIAGNOSIS — M79.7 FIBROMYALGIA: ICD-10-CM

## 2019-03-26 DIAGNOSIS — E03.9 HYPOTHYROIDISM, UNSPECIFIED TYPE: ICD-10-CM

## 2019-03-26 DIAGNOSIS — I25.118 ATHEROSCLEROSIS OF NATIVE CORONARY ARTERY OF NATIVE HEART WITH STABLE ANGINA PECTORIS (H): ICD-10-CM

## 2019-03-26 DIAGNOSIS — F41.9 ANXIETY: ICD-10-CM

## 2019-03-26 DIAGNOSIS — I10 ESSENTIAL HYPERTENSION: ICD-10-CM

## 2019-03-26 DIAGNOSIS — I48.20 CHRONIC ATRIAL FIBRILLATION (H): ICD-10-CM

## 2019-03-26 DIAGNOSIS — J44.9 CHRONIC OBSTRUCTIVE PULMONARY DISEASE, UNSPECIFIED COPD TYPE (H): ICD-10-CM

## 2019-03-26 LAB
ANION GAP SERPL CALCULATED.3IONS-SCNC: 14 MMOL/L (ref 5–18)
BUN SERPL-MCNC: 30 MG/DL (ref 8–28)
CALCIUM SERPL-MCNC: 9.9 MG/DL (ref 8.5–10.5)
CHLORIDE BLD-SCNC: 97 MMOL/L (ref 98–107)
CO2 SERPL-SCNC: 28 MMOL/L (ref 22–31)
CREAT SERPL-MCNC: 1.12 MG/DL (ref 0.6–1.1)
ERYTHROCYTE [DISTWIDTH] IN BLOOD BY AUTOMATED COUNT: 11.6 % (ref 11–14.5)
GFR SERPL CREATININE-BSD FRML MDRD: 46 ML/MIN/1.73M2
GLUCOSE BLD-MCNC: 102 MG/DL (ref 70–125)
HCT VFR BLD AUTO: 38.2 % (ref 35–47)
HGB BLD-MCNC: 13 G/DL (ref 12–16)
MCH RBC QN AUTO: 32.6 PG (ref 27–34)
MCHC RBC AUTO-ENTMCNC: 34.1 G/DL (ref 32–36)
MCV RBC AUTO: 96 FL (ref 80–100)
PLATELET # BLD AUTO: 256 THOU/UL (ref 140–440)
PMV BLD AUTO: 6.9 FL (ref 7–10)
POTASSIUM BLD-SCNC: 3.5 MMOL/L (ref 3.5–5)
RBC # BLD AUTO: 4 MILL/UL (ref 3.8–5.4)
SODIUM SERPL-SCNC: 139 MMOL/L (ref 136–145)
WBC: 6.4 THOU/UL (ref 4–11)

## 2019-03-26 ASSESSMENT — MIFFLIN-ST. JEOR: SCORE: 942.52

## 2019-04-01 ENCOUNTER — COMMUNICATION - HEALTHEAST (OUTPATIENT)
Dept: CARDIOLOGY | Facility: CLINIC | Age: 84
End: 2019-04-01

## 2019-04-04 ENCOUNTER — AMBULATORY - HEALTHEAST (OUTPATIENT)
Dept: CARDIOLOGY | Facility: CLINIC | Age: 84
End: 2019-04-04

## 2019-04-04 ENCOUNTER — SURGERY - HEALTHEAST (OUTPATIENT)
Dept: CARDIOLOGY | Facility: CLINIC | Age: 84
End: 2019-04-04

## 2019-04-04 ENCOUNTER — ANESTHESIA - HEALTHEAST (OUTPATIENT)
Dept: CARDIOLOGY | Facility: CLINIC | Age: 84
End: 2019-04-04

## 2019-04-04 ASSESSMENT — MIFFLIN-ST. JEOR
SCORE: 928.92
SCORE: 934.88

## 2019-04-05 ASSESSMENT — MIFFLIN-ST. JEOR: SCORE: 971.1

## 2019-04-06 ASSESSMENT — MIFFLIN-ST. JEOR: SCORE: 977.45

## 2019-04-09 ENCOUNTER — COMMUNICATION - HEALTHEAST (OUTPATIENT)
Dept: CARDIOLOGY | Facility: CLINIC | Age: 84
End: 2019-04-09

## 2019-04-09 ENCOUNTER — COMMUNICATION - HEALTHEAST (OUTPATIENT)
Dept: CARE COORDINATION | Facility: CLINIC | Age: 84
End: 2019-04-09

## 2019-04-09 ENCOUNTER — AMBULATORY - HEALTHEAST (OUTPATIENT)
Dept: CARDIOLOGY | Facility: CLINIC | Age: 84
End: 2019-04-09

## 2019-04-09 DIAGNOSIS — I48.0 PAROXYSMAL ATRIAL FIBRILLATION (H): ICD-10-CM

## 2019-04-09 ASSESSMENT — MIFFLIN-ST. JEOR: SCORE: 978.81

## 2019-04-11 ENCOUNTER — COMMUNICATION - HEALTHEAST (OUTPATIENT)
Dept: INTERNAL MEDICINE | Facility: CLINIC | Age: 84
End: 2019-04-11

## 2019-04-11 DIAGNOSIS — I10 ESSENTIAL HYPERTENSION: ICD-10-CM

## 2019-04-11 DIAGNOSIS — M79.7 FIBROMYALGIA: ICD-10-CM

## 2019-04-15 ENCOUNTER — OFFICE VISIT - HEALTHEAST (OUTPATIENT)
Dept: INTERNAL MEDICINE | Facility: CLINIC | Age: 84
End: 2019-04-15

## 2019-04-15 DIAGNOSIS — M79.7 FIBROMYALGIA: ICD-10-CM

## 2019-04-15 DIAGNOSIS — I48.0 PAROXYSMAL ATRIAL FIBRILLATION (H): ICD-10-CM

## 2019-04-15 DIAGNOSIS — J44.9 CHRONIC OBSTRUCTIVE PULMONARY DISEASE, UNSPECIFIED COPD TYPE (H): ICD-10-CM

## 2019-04-15 DIAGNOSIS — I10 ESSENTIAL HYPERTENSION: ICD-10-CM

## 2019-04-15 DIAGNOSIS — S30.1XXA HEMATOMA OF GROIN, INITIAL ENCOUNTER: ICD-10-CM

## 2019-04-15 LAB
ANION GAP SERPL CALCULATED.3IONS-SCNC: 13 MMOL/L (ref 5–18)
BUN SERPL-MCNC: 28 MG/DL (ref 8–28)
CALCIUM SERPL-MCNC: 9.8 MG/DL (ref 8.5–10.5)
CHLORIDE BLD-SCNC: 97 MMOL/L (ref 98–107)
CO2 SERPL-SCNC: 31 MMOL/L (ref 22–31)
CREAT SERPL-MCNC: 1.29 MG/DL (ref 0.6–1.1)
ERYTHROCYTE [DISTWIDTH] IN BLOOD BY AUTOMATED COUNT: 13 % (ref 11–14.5)
GFR SERPL CREATININE-BSD FRML MDRD: 39 ML/MIN/1.73M2
GLUCOSE BLD-MCNC: 105 MG/DL (ref 70–125)
HCT VFR BLD AUTO: 29.2 % (ref 35–47)
HGB BLD-MCNC: 9.9 G/DL (ref 12–16)
MCH RBC QN AUTO: 33.4 PG (ref 27–34)
MCHC RBC AUTO-ENTMCNC: 34.1 G/DL (ref 32–36)
MCV RBC AUTO: 98 FL (ref 80–100)
PLATELET # BLD AUTO: 306 THOU/UL (ref 140–440)
PMV BLD AUTO: 6.8 FL (ref 7–10)
POTASSIUM BLD-SCNC: 3.8 MMOL/L (ref 3.5–5)
RBC # BLD AUTO: 2.98 MILL/UL (ref 3.8–5.4)
SODIUM SERPL-SCNC: 141 MMOL/L (ref 136–145)
WBC: 6.5 THOU/UL (ref 4–11)

## 2019-04-16 ENCOUNTER — COMMUNICATION - HEALTHEAST (OUTPATIENT)
Dept: INTERNAL MEDICINE | Facility: CLINIC | Age: 84
End: 2019-04-16

## 2019-04-23 ENCOUNTER — OFFICE VISIT - HEALTHEAST (OUTPATIENT)
Dept: INTERNAL MEDICINE | Facility: CLINIC | Age: 84
End: 2019-04-23

## 2019-04-23 DIAGNOSIS — I10 ESSENTIAL HYPERTENSION: ICD-10-CM

## 2019-04-23 DIAGNOSIS — L03.115 CELLULITIS OF RIGHT LOWER EXTREMITY: ICD-10-CM

## 2019-04-23 DIAGNOSIS — I48.20 CHRONIC ATRIAL FIBRILLATION (H): ICD-10-CM

## 2019-04-23 DIAGNOSIS — S30.1XXA HEMATOMA OF GROIN, INITIAL ENCOUNTER: ICD-10-CM

## 2019-04-23 ASSESSMENT — MIFFLIN-ST. JEOR: SCORE: 942.52

## 2019-04-30 ENCOUNTER — OFFICE VISIT - HEALTHEAST (OUTPATIENT)
Dept: CARDIOLOGY | Facility: CLINIC | Age: 84
End: 2019-04-30

## 2019-04-30 DIAGNOSIS — I25.118 ATHEROSCLEROSIS OF NATIVE CORONARY ARTERY OF NATIVE HEART WITH STABLE ANGINA PECTORIS (H): ICD-10-CM

## 2019-04-30 DIAGNOSIS — I10 ESSENTIAL HYPERTENSION: ICD-10-CM

## 2019-04-30 DIAGNOSIS — S30.1XXD HEMATOMA OF GROIN, SUBSEQUENT ENCOUNTER: ICD-10-CM

## 2019-04-30 DIAGNOSIS — R22.41 LOCALIZED SWELLING, MASS AND LUMP, RIGHT LOWER LIMB: ICD-10-CM

## 2019-04-30 ASSESSMENT — MIFFLIN-ST. JEOR: SCORE: 983.8

## 2019-05-01 ENCOUNTER — HOSPITAL ENCOUNTER (OUTPATIENT)
Dept: ULTRASOUND IMAGING | Facility: CLINIC | Age: 84
Discharge: HOME | End: 2019-05-01
Attending: INTERNAL MEDICINE

## 2019-05-01 DIAGNOSIS — R22.41 LOCALIZED SWELLING, MASS AND LUMP, RIGHT LOWER LIMB: ICD-10-CM

## 2019-05-01 DIAGNOSIS — T81.718S COMPLICATION OF OTHER ARTERY FOLLOWING A PROCEDURE, NOT ELSEWHERE CLASSIFIED, SEQUELA: ICD-10-CM

## 2019-05-01 DIAGNOSIS — S30.1XXD HEMATOMA OF GROIN, SUBSEQUENT ENCOUNTER: ICD-10-CM

## 2019-05-06 ENCOUNTER — OFFICE VISIT - HEALTHEAST (OUTPATIENT)
Dept: INTERNAL MEDICINE | Facility: CLINIC | Age: 84
End: 2019-05-06

## 2019-05-06 DIAGNOSIS — M79.7 FIBROMYALGIA: ICD-10-CM

## 2019-05-06 DIAGNOSIS — S30.1XXD HEMATOMA OF GROIN, SUBSEQUENT ENCOUNTER: ICD-10-CM

## 2019-05-06 DIAGNOSIS — K25.4 GASTROINTESTINAL HEMORRHAGE ASSOCIATED WITH GASTRIC ULCER: ICD-10-CM

## 2019-05-06 DIAGNOSIS — I48.20 CHRONIC ATRIAL FIBRILLATION (H): ICD-10-CM

## 2019-05-06 LAB
ANION GAP SERPL CALCULATED.3IONS-SCNC: 13 MMOL/L (ref 5–18)
BUN SERPL-MCNC: 25 MG/DL (ref 8–28)
CALCIUM SERPL-MCNC: 10.5 MG/DL (ref 8.5–10.5)
CHLORIDE BLD-SCNC: 101 MMOL/L (ref 98–107)
CO2 SERPL-SCNC: 32 MMOL/L (ref 22–31)
CREAT SERPL-MCNC: 1.15 MG/DL (ref 0.6–1.1)
GFR SERPL CREATININE-BSD FRML MDRD: 45 ML/MIN/1.73M2
GLUCOSE BLD-MCNC: 105 MG/DL (ref 70–125)
HGB BLD-MCNC: 12 G/DL (ref 12–16)
POTASSIUM BLD-SCNC: 4.3 MMOL/L (ref 3.5–5)
SODIUM SERPL-SCNC: 146 MMOL/L (ref 136–145)

## 2019-05-06 ASSESSMENT — MIFFLIN-ST. JEOR: SCORE: 965.2

## 2019-05-07 ENCOUNTER — COMMUNICATION - HEALTHEAST (OUTPATIENT)
Dept: INTERNAL MEDICINE | Facility: CLINIC | Age: 84
End: 2019-05-07

## 2019-05-07 DIAGNOSIS — I48.20 CHRONIC ATRIAL FIBRILLATION (H): ICD-10-CM

## 2019-05-07 DIAGNOSIS — E78.2 MIXED HYPERLIPIDEMIA: ICD-10-CM

## 2019-06-10 ENCOUNTER — OFFICE VISIT - HEALTHEAST (OUTPATIENT)
Dept: INTERNAL MEDICINE | Facility: CLINIC | Age: 84
End: 2019-06-10

## 2019-06-10 DIAGNOSIS — I10 ESSENTIAL HYPERTENSION: ICD-10-CM

## 2019-06-10 DIAGNOSIS — M79.7 FIBROMYALGIA: ICD-10-CM

## 2019-06-10 DIAGNOSIS — I48.20 CHRONIC ATRIAL FIBRILLATION (H): ICD-10-CM

## 2019-06-10 DIAGNOSIS — S30.1XXD HEMATOMA OF GROIN, SUBSEQUENT ENCOUNTER: ICD-10-CM

## 2019-06-10 LAB
ANION GAP SERPL CALCULATED.3IONS-SCNC: 14 MMOL/L (ref 5–18)
BUN SERPL-MCNC: 30 MG/DL (ref 8–28)
CALCIUM SERPL-MCNC: 10.4 MG/DL (ref 8.5–10.5)
CHLORIDE BLD-SCNC: 99 MMOL/L (ref 98–107)
CO2 SERPL-SCNC: 27 MMOL/L (ref 22–31)
CREAT SERPL-MCNC: 1.27 MG/DL (ref 0.6–1.1)
ERYTHROCYTE [DISTWIDTH] IN BLOOD BY AUTOMATED COUNT: 12.3 % (ref 11–14.5)
GFR SERPL CREATININE-BSD FRML MDRD: 40 ML/MIN/1.73M2
GLUCOSE BLD-MCNC: 119 MG/DL (ref 70–125)
HCT VFR BLD AUTO: 38.4 % (ref 35–47)
HGB BLD-MCNC: 12.8 G/DL (ref 12–16)
MCH RBC QN AUTO: 32.5 PG (ref 27–34)
MCHC RBC AUTO-ENTMCNC: 33.3 G/DL (ref 32–36)
MCV RBC AUTO: 98 FL (ref 80–100)
PLATELET # BLD AUTO: 288 THOU/UL (ref 140–440)
PMV BLD AUTO: 7.4 FL (ref 7–10)
POTASSIUM BLD-SCNC: 3.5 MMOL/L (ref 3.5–5)
RBC # BLD AUTO: 3.93 MILL/UL (ref 3.8–5.4)
SODIUM SERPL-SCNC: 140 MMOL/L (ref 136–145)
WBC: 6.8 THOU/UL (ref 4–11)

## 2019-06-11 ENCOUNTER — COMMUNICATION - HEALTHEAST (OUTPATIENT)
Dept: INTERNAL MEDICINE | Facility: CLINIC | Age: 84
End: 2019-06-11

## 2019-06-11 DIAGNOSIS — J44.9 CHRONIC OBSTRUCTIVE PULMONARY DISEASE, UNSPECIFIED COPD TYPE (H): ICD-10-CM

## 2019-07-08 ENCOUNTER — COMMUNICATION - HEALTHEAST (OUTPATIENT)
Dept: INTERNAL MEDICINE | Facility: CLINIC | Age: 84
End: 2019-07-08

## 2019-07-08 DIAGNOSIS — I48.91 ATRIAL FIBRILLATION WITH RVR (H): ICD-10-CM

## 2019-07-15 ENCOUNTER — OFFICE VISIT - HEALTHEAST (OUTPATIENT)
Dept: INTERNAL MEDICINE | Facility: CLINIC | Age: 84
End: 2019-07-15

## 2019-07-15 DIAGNOSIS — E87.1 HYPONATREMIA: ICD-10-CM

## 2019-07-15 DIAGNOSIS — D50.0 IRON DEFICIENCY ANEMIA DUE TO CHRONIC BLOOD LOSS: ICD-10-CM

## 2019-07-15 DIAGNOSIS — E78.5 DYSLIPIDEMIA: ICD-10-CM

## 2019-07-15 DIAGNOSIS — I10 ESSENTIAL HYPERTENSION: ICD-10-CM

## 2019-07-15 DIAGNOSIS — I48.20 CHRONIC ATRIAL FIBRILLATION (H): ICD-10-CM

## 2019-07-15 LAB
ANION GAP SERPL CALCULATED.3IONS-SCNC: 12 MMOL/L (ref 5–18)
BUN SERPL-MCNC: 26 MG/DL (ref 8–28)
CALCIUM SERPL-MCNC: 9.8 MG/DL (ref 8.5–10.5)
CHLORIDE BLD-SCNC: 102 MMOL/L (ref 98–107)
CHOLEST SERPL-MCNC: 153 MG/DL
CO2 SERPL-SCNC: 27 MMOL/L (ref 22–31)
CREAT SERPL-MCNC: 0.99 MG/DL (ref 0.6–1.1)
ERYTHROCYTE [DISTWIDTH] IN BLOOD BY AUTOMATED COUNT: 12.2 % (ref 11–14.5)
FASTING STATUS PATIENT QL REPORTED: YES
GFR SERPL CREATININE-BSD FRML MDRD: 53 ML/MIN/1.73M2
GLUCOSE BLD-MCNC: 112 MG/DL (ref 70–125)
HCT VFR BLD AUTO: 38.1 % (ref 35–47)
HDLC SERPL-MCNC: 71 MG/DL
HGB BLD-MCNC: 12.8 G/DL (ref 12–16)
LDLC SERPL CALC-MCNC: 62 MG/DL
MCH RBC QN AUTO: 32.4 PG (ref 27–34)
MCHC RBC AUTO-ENTMCNC: 33.7 G/DL (ref 32–36)
MCV RBC AUTO: 96 FL (ref 80–100)
PLATELET # BLD AUTO: 252 THOU/UL (ref 140–440)
PMV BLD AUTO: 6.9 FL (ref 7–10)
POTASSIUM BLD-SCNC: 3.6 MMOL/L (ref 3.5–5)
RBC # BLD AUTO: 3.97 MILL/UL (ref 3.8–5.4)
SODIUM SERPL-SCNC: 141 MMOL/L (ref 136–145)
TRIGL SERPL-MCNC: 100 MG/DL
WBC: 6.8 THOU/UL (ref 4–11)

## 2019-07-15 ASSESSMENT — MIFFLIN-ST. JEOR: SCORE: 960.67

## 2019-07-16 ENCOUNTER — COMMUNICATION - HEALTHEAST (OUTPATIENT)
Dept: INTERNAL MEDICINE | Facility: CLINIC | Age: 84
End: 2019-07-16

## 2019-08-05 ENCOUNTER — COMMUNICATION - HEALTHEAST (OUTPATIENT)
Dept: INTERNAL MEDICINE | Facility: CLINIC | Age: 84
End: 2019-08-05

## 2019-08-05 DIAGNOSIS — M79.7 FIBROMYALGIA: ICD-10-CM

## 2019-08-12 ENCOUNTER — OFFICE VISIT - HEALTHEAST (OUTPATIENT)
Dept: INTERNAL MEDICINE | Facility: CLINIC | Age: 84
End: 2019-08-12

## 2019-08-12 DIAGNOSIS — M15.0 PRIMARY OSTEOARTHRITIS INVOLVING MULTIPLE JOINTS: ICD-10-CM

## 2019-08-12 DIAGNOSIS — I10 ESSENTIAL HYPERTENSION: ICD-10-CM

## 2019-08-12 DIAGNOSIS — I48.20 CHRONIC ATRIAL FIBRILLATION (H): ICD-10-CM

## 2019-08-12 ASSESSMENT — MIFFLIN-ST. JEOR: SCORE: 960.67

## 2019-08-20 ENCOUNTER — OFFICE VISIT - HEALTHEAST (OUTPATIENT)
Dept: INTERNAL MEDICINE | Facility: CLINIC | Age: 84
End: 2019-08-20

## 2019-08-20 DIAGNOSIS — M19.90 OSTEOARTHRITIS: ICD-10-CM

## 2019-08-20 DIAGNOSIS — M17.12 PRIMARY OSTEOARTHRITIS OF LEFT KNEE: ICD-10-CM

## 2019-08-20 ASSESSMENT — MIFFLIN-ST. JEOR: SCORE: 960.67

## 2019-09-05 ENCOUNTER — COMMUNICATION - HEALTHEAST (OUTPATIENT)
Dept: INTERNAL MEDICINE | Facility: CLINIC | Age: 84
End: 2019-09-05

## 2019-09-05 DIAGNOSIS — M79.7 FIBROMYALGIA: ICD-10-CM

## 2019-09-14 ENCOUNTER — COMMUNICATION - HEALTHEAST (OUTPATIENT)
Dept: INTERNAL MEDICINE | Facility: CLINIC | Age: 84
End: 2019-09-14

## 2019-09-14 DIAGNOSIS — D50.0 IRON DEFICIENCY ANEMIA DUE TO CHRONIC BLOOD LOSS: ICD-10-CM

## 2019-09-16 ENCOUNTER — OFFICE VISIT - HEALTHEAST (OUTPATIENT)
Dept: INTERNAL MEDICINE | Facility: CLINIC | Age: 84
End: 2019-09-16

## 2019-09-16 DIAGNOSIS — I10 ESSENTIAL HYPERTENSION: ICD-10-CM

## 2019-09-16 DIAGNOSIS — H61.21 IMPACTED CERUMEN OF RIGHT EAR: ICD-10-CM

## 2019-09-16 DIAGNOSIS — M70.50 ANSERINE BURSITIS: ICD-10-CM

## 2019-09-16 DIAGNOSIS — K25.4 GASTROINTESTINAL HEMORRHAGE ASSOCIATED WITH GASTRIC ULCER: ICD-10-CM

## 2019-09-16 DIAGNOSIS — I48.20 CHRONIC ATRIAL FIBRILLATION (H): ICD-10-CM

## 2019-09-17 ASSESSMENT — MIFFLIN-ST. JEOR: SCORE: 942.52

## 2019-10-01 ENCOUNTER — COMMUNICATION - HEALTHEAST (OUTPATIENT)
Dept: INTERNAL MEDICINE | Facility: CLINIC | Age: 84
End: 2019-10-01

## 2019-10-01 DIAGNOSIS — M79.7 FIBROMYALGIA: ICD-10-CM

## 2019-10-29 ENCOUNTER — COMMUNICATION - HEALTHEAST (OUTPATIENT)
Dept: INTERNAL MEDICINE | Facility: CLINIC | Age: 84
End: 2019-10-29

## 2019-10-29 DIAGNOSIS — I48.91 ATRIAL FIBRILLATION WITH RVR (H): ICD-10-CM

## 2019-10-29 DIAGNOSIS — E87.6 POTASSIUM (K) DEFICIENCY: ICD-10-CM

## 2019-10-30 ENCOUNTER — COMMUNICATION - HEALTHEAST (OUTPATIENT)
Dept: INTERNAL MEDICINE | Facility: CLINIC | Age: 84
End: 2019-10-30

## 2019-10-30 DIAGNOSIS — M79.7 FIBROMYALGIA: ICD-10-CM

## 2019-11-18 ENCOUNTER — OFFICE VISIT - HEALTHEAST (OUTPATIENT)
Dept: INTERNAL MEDICINE | Facility: CLINIC | Age: 84
End: 2019-11-18

## 2019-11-18 DIAGNOSIS — M15.0 PRIMARY OSTEOARTHRITIS INVOLVING MULTIPLE JOINTS: ICD-10-CM

## 2019-11-18 DIAGNOSIS — Z23 NEED FOR VACCINATION: ICD-10-CM

## 2019-11-18 DIAGNOSIS — D50.0 IRON DEFICIENCY ANEMIA DUE TO CHRONIC BLOOD LOSS: ICD-10-CM

## 2019-11-18 DIAGNOSIS — I10 ESSENTIAL HYPERTENSION: ICD-10-CM

## 2019-11-18 DIAGNOSIS — I48.20 CHRONIC ATRIAL FIBRILLATION (H): ICD-10-CM

## 2019-11-18 LAB
ANION GAP SERPL CALCULATED.3IONS-SCNC: 14 MMOL/L (ref 5–18)
BUN SERPL-MCNC: 37 MG/DL (ref 8–28)
CALCIUM SERPL-MCNC: 9.6 MG/DL (ref 8.5–10.5)
CHLORIDE BLD-SCNC: 102 MMOL/L (ref 98–107)
CO2 SERPL-SCNC: 27 MMOL/L (ref 22–31)
CREAT SERPL-MCNC: 1.33 MG/DL (ref 0.6–1.1)
ERYTHROCYTE [DISTWIDTH] IN BLOOD BY AUTOMATED COUNT: 12.1 % (ref 11–14.5)
GFR SERPL CREATININE-BSD FRML MDRD: 38 ML/MIN/1.73M2
GLUCOSE BLD-MCNC: 103 MG/DL (ref 70–125)
HCT VFR BLD AUTO: 37.5 % (ref 35–47)
HGB BLD-MCNC: 12.7 G/DL (ref 12–16)
MCH RBC QN AUTO: 34.7 PG (ref 27–34)
MCHC RBC AUTO-ENTMCNC: 33.8 G/DL (ref 32–36)
MCV RBC AUTO: 102 FL (ref 80–100)
PLATELET # BLD AUTO: 286 THOU/UL (ref 140–440)
PMV BLD AUTO: 6.8 FL (ref 7–10)
POTASSIUM BLD-SCNC: 3.6 MMOL/L (ref 3.5–5)
RBC # BLD AUTO: 3.66 MILL/UL (ref 3.8–5.4)
SODIUM SERPL-SCNC: 143 MMOL/L (ref 136–145)
WBC: 8.4 THOU/UL (ref 4–11)

## 2019-11-18 ASSESSMENT — MIFFLIN-ST. JEOR: SCORE: 942.52

## 2019-11-19 ENCOUNTER — COMMUNICATION - HEALTHEAST (OUTPATIENT)
Dept: INTERNAL MEDICINE | Facility: CLINIC | Age: 84
End: 2019-11-19

## 2019-11-22 ENCOUNTER — COMMUNICATION - HEALTHEAST (OUTPATIENT)
Dept: INTERNAL MEDICINE | Facility: CLINIC | Age: 84
End: 2019-11-22

## 2019-11-25 ENCOUNTER — OFFICE VISIT - HEALTHEAST (OUTPATIENT)
Dept: INTERNAL MEDICINE | Facility: CLINIC | Age: 84
End: 2019-11-25

## 2019-11-25 DIAGNOSIS — M15.0 PRIMARY OSTEOARTHRITIS INVOLVING MULTIPLE JOINTS: ICD-10-CM

## 2019-11-25 ASSESSMENT — MIFFLIN-ST. JEOR: SCORE: 937.99

## 2019-11-30 ENCOUNTER — COMMUNICATION - HEALTHEAST (OUTPATIENT)
Dept: INTERNAL MEDICINE | Facility: CLINIC | Age: 84
End: 2019-11-30

## 2019-11-30 DIAGNOSIS — M79.7 FIBROMYALGIA: ICD-10-CM

## 2020-01-02 ENCOUNTER — COMMUNICATION - HEALTHEAST (OUTPATIENT)
Dept: INTERNAL MEDICINE | Facility: CLINIC | Age: 85
End: 2020-01-02

## 2020-01-02 DIAGNOSIS — J44.9 CHRONIC OBSTRUCTIVE PULMONARY DISEASE, UNSPECIFIED COPD TYPE (H): ICD-10-CM

## 2020-01-02 DIAGNOSIS — M15.0 PRIMARY OSTEOARTHRITIS INVOLVING MULTIPLE JOINTS: ICD-10-CM

## 2020-01-02 DIAGNOSIS — F43.23 ADJUSTMENT DISORDER WITH MIXED ANXIETY AND DEPRESSED MOOD: ICD-10-CM

## 2020-01-02 DIAGNOSIS — M79.7 FIBROMYALGIA: ICD-10-CM

## 2020-01-20 ENCOUNTER — OFFICE VISIT - HEALTHEAST (OUTPATIENT)
Dept: INTERNAL MEDICINE | Facility: CLINIC | Age: 85
End: 2020-01-20

## 2020-01-20 DIAGNOSIS — I48.20 CHRONIC ATRIAL FIBRILLATION (H): ICD-10-CM

## 2020-01-20 DIAGNOSIS — I10 ESSENTIAL HYPERTENSION: ICD-10-CM

## 2020-01-20 DIAGNOSIS — S09.90XD INJURY OF HEAD, SUBSEQUENT ENCOUNTER: ICD-10-CM

## 2020-01-20 ASSESSMENT — MIFFLIN-ST. JEOR: SCORE: 910.77

## 2020-01-31 ENCOUNTER — COMMUNICATION - HEALTHEAST (OUTPATIENT)
Dept: INTERNAL MEDICINE | Facility: CLINIC | Age: 85
End: 2020-01-31

## 2020-01-31 ENCOUNTER — COMMUNICATION - HEALTHEAST (OUTPATIENT)
Dept: CARDIOLOGY | Facility: CLINIC | Age: 85
End: 2020-01-31

## 2020-01-31 DIAGNOSIS — I10 ESSENTIAL HYPERTENSION: ICD-10-CM

## 2020-01-31 DIAGNOSIS — E03.9 HYPOTHYROIDISM: ICD-10-CM

## 2020-01-31 DIAGNOSIS — F43.23 ADJUSTMENT DISORDER WITH MIXED ANXIETY AND DEPRESSED MOOD: ICD-10-CM

## 2020-01-31 DIAGNOSIS — I48.91 A-FIB (H): ICD-10-CM

## 2020-01-31 DIAGNOSIS — I48.91 ATRIAL FIBRILLATION WITH RVR (H): ICD-10-CM

## 2020-02-01 RX ORDER — HYDROCHLOROTHIAZIDE 25 MG/1
TABLET ORAL
Qty: 90 TABLET | Refills: 3 | Status: SHIPPED | OUTPATIENT
Start: 2020-02-01

## 2020-02-01 RX ORDER — LEVOTHYROXINE SODIUM 88 UG/1
88 TABLET ORAL DAILY
Qty: 90 TABLET | Refills: 3 | Status: SHIPPED | OUTPATIENT
Start: 2020-02-01

## 2020-02-03 ENCOUNTER — COMMUNICATION - HEALTHEAST (OUTPATIENT)
Dept: INTERNAL MEDICINE | Facility: CLINIC | Age: 85
End: 2020-02-03

## 2020-03-02 ENCOUNTER — COMMUNICATION - HEALTHEAST (OUTPATIENT)
Dept: INTERNAL MEDICINE | Facility: CLINIC | Age: 85
End: 2020-03-02

## 2020-03-02 DIAGNOSIS — M79.7 FIBROMYALGIA: ICD-10-CM

## 2020-03-04 ENCOUNTER — COMMUNICATION - HEALTHEAST (OUTPATIENT)
Dept: INTERNAL MEDICINE | Facility: CLINIC | Age: 85
End: 2020-03-04

## 2020-03-04 DIAGNOSIS — I48.20 CHRONIC ATRIAL FIBRILLATION (H): ICD-10-CM

## 2020-03-25 ENCOUNTER — COMMUNICATION - HEALTHEAST (OUTPATIENT)
Dept: INTERNAL MEDICINE | Facility: CLINIC | Age: 85
End: 2020-03-25

## 2020-03-25 DIAGNOSIS — M79.7 FIBROMYALGIA: ICD-10-CM

## 2020-03-25 DIAGNOSIS — J44.9 CHRONIC OBSTRUCTIVE PULMONARY DISEASE, UNSPECIFIED COPD TYPE (H): ICD-10-CM

## 2020-04-24 ENCOUNTER — COMMUNICATION - HEALTHEAST (OUTPATIENT)
Dept: CARDIOLOGY | Facility: CLINIC | Age: 85
End: 2020-04-24

## 2020-04-24 ENCOUNTER — COMMUNICATION - HEALTHEAST (OUTPATIENT)
Dept: INTERNAL MEDICINE | Facility: CLINIC | Age: 85
End: 2020-04-24

## 2020-04-24 DIAGNOSIS — M15.0 PRIMARY OSTEOARTHRITIS INVOLVING MULTIPLE JOINTS: ICD-10-CM

## 2020-04-24 DIAGNOSIS — I48.91 A-FIB (H): ICD-10-CM

## 2020-04-24 DIAGNOSIS — M79.7 FIBROMYALGIA: ICD-10-CM

## 2020-04-24 DIAGNOSIS — I48.91 ATRIAL FIBRILLATION WITH RVR (H): ICD-10-CM

## 2020-05-25 ENCOUNTER — COMMUNICATION - HEALTHEAST (OUTPATIENT)
Dept: INTERNAL MEDICINE | Facility: CLINIC | Age: 85
End: 2020-05-25

## 2020-05-25 DIAGNOSIS — E78.2 MIXED HYPERLIPIDEMIA: ICD-10-CM

## 2020-05-25 DIAGNOSIS — M79.7 FIBROMYALGIA: ICD-10-CM

## 2020-05-26 ENCOUNTER — COMMUNICATION - HEALTHEAST (OUTPATIENT)
Dept: INTERNAL MEDICINE | Facility: CLINIC | Age: 85
End: 2020-05-26

## 2020-05-26 ENCOUNTER — COMMUNICATION - HEALTHEAST (OUTPATIENT)
Dept: CARDIOLOGY | Facility: CLINIC | Age: 85
End: 2020-05-26

## 2020-05-26 DIAGNOSIS — M79.7 FIBROMYALGIA: ICD-10-CM

## 2020-05-26 DIAGNOSIS — I10 ESSENTIAL HYPERTENSION: ICD-10-CM

## 2020-05-26 DIAGNOSIS — I48.91 A-FIB (H): ICD-10-CM

## 2020-05-27 RX ORDER — ATORVASTATIN CALCIUM 10 MG/1
TABLET, FILM COATED ORAL
Qty: 90 TABLET | Refills: 2 | Status: SHIPPED | OUTPATIENT
Start: 2020-05-27

## 2020-06-04 ENCOUNTER — OFFICE VISIT - HEALTHEAST (OUTPATIENT)
Dept: CARDIOLOGY | Facility: CLINIC | Age: 85
End: 2020-06-04

## 2020-06-04 DIAGNOSIS — I10 ESSENTIAL HYPERTENSION: ICD-10-CM

## 2020-06-04 DIAGNOSIS — I48.91 ATRIAL FIBRILLATION WITH RVR (H): ICD-10-CM

## 2020-06-04 DIAGNOSIS — I48.20 CHRONIC ATRIAL FIBRILLATION (H): ICD-10-CM

## 2020-06-04 DIAGNOSIS — I25.10 ATHEROSCLEROSIS OF NATIVE CORONARY ARTERY OF NATIVE HEART WITHOUT ANGINA PECTORIS: ICD-10-CM

## 2020-06-04 RX ORDER — METOPROLOL SUCCINATE 50 MG/1
100 TABLET, EXTENDED RELEASE ORAL AT BEDTIME
Qty: 180 TABLET | Refills: 3 | Status: SHIPPED | OUTPATIENT
Start: 2020-06-04

## 2020-06-24 ENCOUNTER — COMMUNICATION - HEALTHEAST (OUTPATIENT)
Dept: INTERNAL MEDICINE | Facility: CLINIC | Age: 85
End: 2020-06-24

## 2020-06-24 DIAGNOSIS — M79.7 FIBROMYALGIA: ICD-10-CM

## 2020-06-24 DIAGNOSIS — J44.9 CHRONIC OBSTRUCTIVE PULMONARY DISEASE, UNSPECIFIED COPD TYPE (H): ICD-10-CM

## 2020-06-24 RX ORDER — ALBUTEROL SULFATE 90 UG/1
AEROSOL, METERED RESPIRATORY (INHALATION)
Qty: 54 EACH | Refills: 1 | Status: SHIPPED | OUTPATIENT
Start: 2020-06-24

## 2020-06-26 ENCOUNTER — COMMUNICATION - HEALTHEAST (OUTPATIENT)
Dept: INTERNAL MEDICINE | Facility: CLINIC | Age: 85
End: 2020-06-26

## 2020-06-26 DIAGNOSIS — I10 ESSENTIAL HYPERTENSION: ICD-10-CM

## 2020-06-26 RX ORDER — FUROSEMIDE 80 MG
80 TABLET ORAL DAILY
Qty: 90 TABLET | Refills: 2 | Status: SHIPPED | OUTPATIENT
Start: 2020-06-26

## 2020-07-13 ENCOUNTER — OFFICE VISIT - HEALTHEAST (OUTPATIENT)
Dept: INTERNAL MEDICINE | Facility: CLINIC | Age: 85
End: 2020-07-13

## 2020-07-13 ENCOUNTER — AMBULATORY - HEALTHEAST (OUTPATIENT)
Dept: INTERNAL MEDICINE | Facility: CLINIC | Age: 85
End: 2020-07-13

## 2020-07-13 DIAGNOSIS — M15.0 PRIMARY OSTEOARTHRITIS INVOLVING MULTIPLE JOINTS: ICD-10-CM

## 2020-07-13 DIAGNOSIS — M79.7 FIBROMYALGIA: ICD-10-CM

## 2020-07-13 RX ORDER — DULOXETIN HYDROCHLORIDE 30 MG/1
CAPSULE, DELAYED RELEASE ORAL
Status: SHIPPED | COMMUNITY
Start: 2020-06-24

## 2020-07-13 ASSESSMENT — MIFFLIN-ST. JEOR: SCORE: 910.77

## 2020-07-20 ENCOUNTER — COMMUNICATION - HEALTHEAST (OUTPATIENT)
Dept: INTERNAL MEDICINE | Facility: CLINIC | Age: 85
End: 2020-07-20

## 2020-07-20 DIAGNOSIS — M79.7 FIBROMYALGIA: ICD-10-CM

## 2020-08-19 ENCOUNTER — COMMUNICATION - HEALTHEAST (OUTPATIENT)
Dept: INTERNAL MEDICINE | Facility: CLINIC | Age: 85
End: 2020-08-19

## 2020-08-19 ENCOUNTER — COMMUNICATION - HEALTHEAST (OUTPATIENT)
Dept: CARDIOLOGY | Facility: CLINIC | Age: 85
End: 2020-08-19

## 2020-08-19 DIAGNOSIS — I48.91 A-FIB (H): ICD-10-CM

## 2020-08-19 DIAGNOSIS — M79.7 FIBROMYALGIA: ICD-10-CM

## 2020-08-19 RX ORDER — DILTIAZEM HYDROCHLORIDE 180 MG/1
CAPSULE, EXTENDED RELEASE ORAL
Qty: 90 CAPSULE | Refills: 1 | Status: SHIPPED | OUTPATIENT
Start: 2020-08-19

## 2020-09-14 ENCOUNTER — OFFICE VISIT - HEALTHEAST (OUTPATIENT)
Dept: INTERNAL MEDICINE | Facility: CLINIC | Age: 85
End: 2020-09-14

## 2020-09-14 DIAGNOSIS — M15.0 PRIMARY OSTEOARTHRITIS INVOLVING MULTIPLE JOINTS: ICD-10-CM

## 2020-09-14 DIAGNOSIS — Z23 NEED FOR VACCINATION: ICD-10-CM

## 2020-09-14 DIAGNOSIS — M17.12 PRIMARY OSTEOARTHRITIS OF LEFT KNEE: ICD-10-CM

## 2020-09-21 ENCOUNTER — COMMUNICATION - HEALTHEAST (OUTPATIENT)
Dept: INTERNAL MEDICINE | Facility: CLINIC | Age: 85
End: 2020-09-21

## 2020-09-21 DIAGNOSIS — M79.7 FIBROMYALGIA: ICD-10-CM

## 2020-09-28 ENCOUNTER — COMMUNICATION - HEALTHEAST (OUTPATIENT)
Dept: INTERNAL MEDICINE | Facility: CLINIC | Age: 85
End: 2020-09-28

## 2020-09-28 DIAGNOSIS — I25.118 ATHEROSCLEROSIS OF NATIVE CORONARY ARTERY OF NATIVE HEART WITH STABLE ANGINA PECTORIS (H): ICD-10-CM

## 2020-09-30 RX ORDER — NITROGLYCERIN 0.4 MG/1
TABLET SUBLINGUAL
Qty: 100 TABLET | Refills: 0 | Status: SHIPPED | OUTPATIENT
Start: 2020-09-30

## 2020-10-01 ENCOUNTER — COMMUNICATION - HEALTHEAST (OUTPATIENT)
Dept: CARDIOLOGY | Facility: CLINIC | Age: 85
End: 2020-10-01

## 2020-10-19 ENCOUNTER — COMMUNICATION - HEALTHEAST (OUTPATIENT)
Dept: INTERNAL MEDICINE | Facility: CLINIC | Age: 85
End: 2020-10-19

## 2020-10-19 DIAGNOSIS — M79.7 FIBROMYALGIA: ICD-10-CM

## 2020-11-09 ENCOUNTER — COMMUNICATION - HEALTHEAST (OUTPATIENT)
Dept: INTERNAL MEDICINE | Facility: CLINIC | Age: 85
End: 2020-11-09

## 2020-11-11 ENCOUNTER — OFFICE VISIT - HEALTHEAST (OUTPATIENT)
Dept: INTERNAL MEDICINE | Facility: CLINIC | Age: 85
End: 2020-11-11

## 2020-11-11 DIAGNOSIS — K25.4 CHRONIC GASTRIC ULCER WITH HEMORRHAGE: ICD-10-CM

## 2020-11-11 DIAGNOSIS — M15.0 PRIMARY OSTEOARTHRITIS INVOLVING MULTIPLE JOINTS: ICD-10-CM

## 2020-11-11 DIAGNOSIS — M79.7 FIBROMYALGIA: ICD-10-CM

## 2020-11-16 ENCOUNTER — COMMUNICATION - HEALTHEAST (OUTPATIENT)
Dept: INTERNAL MEDICINE | Facility: CLINIC | Age: 85
End: 2020-11-16

## 2020-11-16 DIAGNOSIS — M79.7 FIBROMYALGIA: ICD-10-CM

## 2020-11-16 DIAGNOSIS — E87.6 POTASSIUM (K) DEFICIENCY: ICD-10-CM

## 2020-11-16 DIAGNOSIS — M15.0 PRIMARY OSTEOARTHRITIS INVOLVING MULTIPLE JOINTS: ICD-10-CM

## 2020-11-17 RX ORDER — CELECOXIB 100 MG/1
100 CAPSULE ORAL DAILY
Qty: 30 CAPSULE | Refills: 2 | Status: SHIPPED | OUTPATIENT
Start: 2020-11-17

## 2020-11-17 RX ORDER — POTASSIUM CHLORIDE 1500 MG/1
TABLET, EXTENDED RELEASE ORAL
Qty: 90 TABLET | Refills: 0 | Status: SHIPPED | OUTPATIENT
Start: 2020-11-17

## 2020-11-18 ENCOUNTER — COMMUNICATION - HEALTHEAST (OUTPATIENT)
Dept: INTERNAL MEDICINE | Facility: CLINIC | Age: 85
End: 2020-11-18

## 2020-11-25 ENCOUNTER — COMMUNICATION - HEALTHEAST (OUTPATIENT)
Dept: INTERNAL MEDICINE | Facility: CLINIC | Age: 85
End: 2020-11-25

## 2020-11-30 ENCOUNTER — COMMUNICATION - HEALTHEAST (OUTPATIENT)
Dept: SCHEDULING | Facility: CLINIC | Age: 85
End: 2020-11-30

## 2020-11-30 ENCOUNTER — COMMUNICATION - HEALTHEAST (OUTPATIENT)
Dept: INTERNAL MEDICINE | Facility: CLINIC | Age: 85
End: 2020-11-30

## 2020-11-30 DIAGNOSIS — M15.0 PRIMARY OSTEOARTHRITIS INVOLVING MULTIPLE JOINTS: ICD-10-CM

## 2020-11-30 RX ORDER — GABAPENTIN 300 MG/1
CAPSULE ORAL
Qty: 125 CAPSULE | Refills: 2 | Status: SHIPPED | OUTPATIENT
Start: 2020-11-30

## 2020-12-10 ENCOUNTER — COMMUNICATION - HEALTHEAST (OUTPATIENT)
Dept: INTERNAL MEDICINE | Facility: CLINIC | Age: 85
End: 2020-12-10

## 2020-12-10 DIAGNOSIS — M79.7 FIBROMYALGIA: ICD-10-CM

## 2020-12-18 ENCOUNTER — OFFICE VISIT - HEALTHEAST (OUTPATIENT)
Dept: INTERNAL MEDICINE | Facility: CLINIC | Age: 85
End: 2020-12-18

## 2020-12-18 DIAGNOSIS — E03.9 HYPOTHYROIDISM, UNSPECIFIED TYPE: ICD-10-CM

## 2020-12-18 DIAGNOSIS — M81.0 AGE-RELATED OSTEOPOROSIS WITHOUT CURRENT PATHOLOGICAL FRACTURE: ICD-10-CM

## 2020-12-18 DIAGNOSIS — E78.2 MIXED HYPERLIPIDEMIA: ICD-10-CM

## 2020-12-18 DIAGNOSIS — E55.9 VITAMIN D INSUFFICIENCY: ICD-10-CM

## 2020-12-18 DIAGNOSIS — I48.20 CHRONIC ATRIAL FIBRILLATION (H): ICD-10-CM

## 2020-12-18 DIAGNOSIS — I10 ESSENTIAL HYPERTENSION: ICD-10-CM

## 2020-12-18 DIAGNOSIS — I25.10 ATHEROSCLEROSIS OF NATIVE CORONARY ARTERY OF NATIVE HEART WITHOUT ANGINA PECTORIS: ICD-10-CM

## 2020-12-18 DIAGNOSIS — M79.7 FIBROMYALGIA: ICD-10-CM

## 2020-12-18 DIAGNOSIS — C56.9 MALIGNANT NEOPLASM OF OVARY, UNSPECIFIED LATERALITY (H): ICD-10-CM

## 2020-12-18 DIAGNOSIS — R73.03 PREDIABETES: ICD-10-CM

## 2020-12-18 DIAGNOSIS — M15.0 PRIMARY OSTEOARTHRITIS INVOLVING MULTIPLE JOINTS: ICD-10-CM

## 2020-12-18 DIAGNOSIS — D50.0 IRON DEFICIENCY ANEMIA DUE TO CHRONIC BLOOD LOSS: ICD-10-CM

## 2020-12-18 DIAGNOSIS — F41.9 ANXIETY: ICD-10-CM

## 2020-12-18 RX ORDER — ALENDRONATE SODIUM 70 MG/1
70 TABLET ORAL
Qty: 4 TABLET | Refills: 11 | Status: SHIPPED | OUTPATIENT
Start: 2020-12-18

## 2021-01-14 ENCOUNTER — COMMUNICATION - HEALTHEAST (OUTPATIENT)
Dept: INTERNAL MEDICINE | Facility: CLINIC | Age: 86
End: 2021-01-14

## 2021-01-14 DIAGNOSIS — F43.23 ADJUSTMENT DISORDER WITH MIXED ANXIETY AND DEPRESSED MOOD: ICD-10-CM

## 2021-01-14 DIAGNOSIS — I10 ESSENTIAL HYPERTENSION: ICD-10-CM

## 2021-01-14 DIAGNOSIS — M79.7 FIBROMYALGIA: ICD-10-CM

## 2021-01-15 ENCOUNTER — COMMUNICATION - HEALTHEAST (OUTPATIENT)
Dept: INTERNAL MEDICINE | Facility: CLINIC | Age: 86
End: 2021-01-15

## 2021-01-15 DIAGNOSIS — D50.0 IRON DEFICIENCY ANEMIA DUE TO CHRONIC BLOOD LOSS: ICD-10-CM

## 2021-01-15 RX ORDER — LOSARTAN POTASSIUM 100 MG/1
TABLET ORAL
Qty: 90 TABLET | Refills: 3 | Status: SHIPPED | OUTPATIENT
Start: 2021-01-15

## 2021-01-15 RX ORDER — BUSPIRONE HYDROCHLORIDE 5 MG/1
TABLET ORAL
Qty: 540 TABLET | Refills: 3 | Status: SHIPPED | OUTPATIENT
Start: 2021-01-15

## 2021-02-14 ENCOUNTER — RECORDS - HEALTHEAST (OUTPATIENT)
Dept: ADMINISTRATIVE | Facility: OTHER | Age: 86
End: 2021-02-14

## 2021-02-15 ENCOUNTER — RECORDS - HEALTHEAST (OUTPATIENT)
Dept: ADMINISTRATIVE | Facility: OTHER | Age: 86
End: 2021-02-15

## 2021-02-15 ENCOUNTER — COMMUNICATION - HEALTHEAST (OUTPATIENT)
Dept: INTERNAL MEDICINE | Facility: CLINIC | Age: 86
End: 2021-02-15

## 2021-05-25 ENCOUNTER — RECORDS - HEALTHEAST (OUTPATIENT)
Dept: ADMINISTRATIVE | Facility: CLINIC | Age: 86
End: 2021-05-25

## 2021-05-26 ENCOUNTER — RECORDS - HEALTHEAST (OUTPATIENT)
Dept: ADMINISTRATIVE | Facility: CLINIC | Age: 86
End: 2021-05-26

## 2021-05-26 NOTE — PROGRESS NOTES
LAAO device education was completed:     See documented H&P completed by NP in EPIC    Reviewed pre and post op PETEY/Watchman procedure with pt, pre-procedure letter reviewed and given to pt- see letter in EPIC under documentation, see additional LAAO devic prep note for details on procedure/prep, risks of PETEY/LAAO discussed, answered pt questions and concerns regarding procedure, time spent with pt was >45min and reviewed available pre-op lab results.    Discussed importance of continuing anticoagulation uninterrupted pre and post LAAO, pt denies missing any doses of their anticoagulant, and pt denies issues with reeves placement in the past.  Instructed patient that they will be contacted post PETEY for further discussion regarding the Watchman procedure.       MODIFIED EBEN SCALE   No prior CVA    Score Description  0 No symptoms at all  1 No significant disability despite symptoms; able to carry out all usual duties and activities  2 Slight disability; unable to carry out all previous activities, but able to look after own affairs without assistance  3 Moderate disability; requiring some help, but able to walk without assistance  4 Moderately severe disability; unable to walk without assistance and unable to attend to own bodily needs without assistance  5 Severe disability; bedridden, incontinent and requiring constant nursing care and attention  6 Dead     Total score (0 - 6): 0      Please see additional anticoagulation note for INR/Coumadin dosing.    Contact information reviewed and pt states understanding.

## 2021-05-26 NOTE — TELEPHONE ENCOUNTER
Spoke with the patient and helped her to schedule a pre-op with Dr. Yin for next Tuesday, 3/26/19 at 2 pm.  She had no further questions at this time.  Libra RICHARDS CMA/CORTEZ....................2:29 PM

## 2021-05-26 NOTE — TELEPHONE ENCOUNTER
New Appointment Needed  What is the reason for the visit:    Pre-Op Appt Request  When is the surgery? :  4/4  Where is the surgery?:   [facility]  Who is the surgeon? :  Dr. Albarado  What type of surgery is being done?: EP ROBERT Closure  Provider Preference: PCP or Dr. Yin  How soon do you need to be seen?: next week, Tuesday or Thursday at 2pm  Waitlist offered?: Yes, declined stating that she will like it done next week, patient is aware that PCP is out of office next week.  Okay to leave a detailed message:  Yes, please call patient to confirm appointment date and time.

## 2021-05-27 NOTE — PROGRESS NOTES
Left Atrial Appendage Certification     I have personally reviewed the medical records for Tori Do as it pertains to her candidacy for left atrial appendage occlusion.     The patient has documented atrial fibrillation and is currently on oral anticoagulant therapy (Eliquis).   [id number] - VASc score = 5. The HAS-BLED risk score is 3.   The patients indication to come off anticoagulation: GI bleeding.     The patient has been provided education regarding atrial fibrillation treatment options including the risks and benefits of warfarin, novel oral anticoagulants, and left atrial appendage occlusion.     Summary:    The patient is a good candidate for proceeding with left atrial appendage screening and implant.

## 2021-05-27 NOTE — TELEPHONE ENCOUNTER
----- Message from Gardenia Kan RN sent at 4/9/2019  1:37 PM CDT -----  [?4/?9/?2019 1:21 PM] Gardenia Kan:   Tori Do, our aborted case came in for f/u r/t sign hematoma post procedure. She's quite swollen and bruised down her entire leg. She states she takes lasix 40 mg every other day. she was wondering if she should take it daily to help absorb some of the extra fluid she has on board post a trip to ICU after procedure. What are your thoughts or should I be reaching out to her PCP?  thank you    [?4/?9/?2019 1:24 PM] Ursula Chapman:   I think that sounds like a good plan.  Let me just check her labs quick to make sure    [?4/?9/?2019 1:34 PM] Ursula Chapman:   does she feel fluid overloaded or is it just the hematoma that is swollen?  I would have her increase it for 5 days, then go back to every other day.  We should recheck a BMP in 1 week - or else her PCP should check it  Is there anything I can take care of for you so you can get out of here?

## 2021-05-27 NOTE — PROGRESS NOTES
Preoperative Exam    Scheduled Procedure: Watchman Procedure  Surgery Date:  4/4/2019  Surgery Location: [facility], fax [phone]    Surgeon:  Dr Albarado    Assessment/Plan:     1. Encounter for preoperative examination for general surgical procedure  No contraindications to proceeding with planned procedure and anesthesia.  Overall risk is low.  - HM2(CBC w/o Differential)  - Basic Metabolic Panel    2. Chronic atrial fibrillation (H)  Chronically anticoagulated with Eliquis with history of GI bleeding with plans for left atrial appendage closure next week.  Continue Eliquis until day of procedure.  She should take both diltiazem and Toprol on the morning of procedure.    3. Chronic obstructive pulmonary disease, unspecified COPD type (H)  Stable from respiratory standpoint    4. Atherosclerosis of native coronary artery of native heart with stable angina pectoris (H)  No change in stable angina.  He continues on aspirin daily along with atorvastatin.    5. Essential hypertension  She will hold losartan/HCTZ and other diuretics on morning of surgery but will take her diltiazem and metoprolol    6. Hypothyroidism, unspecified type  She will take her usual dose of levothyroxine on morning of surgery    7. Fibromyalgia  She will take her usual dose of Cymbalta on the morning of surgery    8. Anxiety  She will take her usual dose of BuSpar on the morning of surgery        Surgical Procedure Risk: Low (reported cardiac risk generally < 1%)  Have you had prior anesthesia?: Yes  Have you or any family members had a previous anesthesia reaction:  No  Do you or any family members have a history of a clotting or bleeding disorder?: No  Cardiac Risk Assessment: no increased risk for major cardiac complications    Patient approved for surgery with general or local anesthesia.        Functional Status: Independent  Patient plans to recover at home with family.     Subjective:      Tori Do is a 87 y.o. female who  presents for a preoperative consultation.      This 87 y.o. old woman with complicated medical history including coronary artery disease, COPD and hypertension with chronic iron deficiency anemia from chronic blood loss related to AVMs and peptic ulcer disease who is on chronic anticoagulation with Eliquis for persistent atrial fibrillation.  Experiencing recurrent GI bleeding with melena.  Plans for watchman procedure.    She has tolerated previous surgeries without complications.  She is stable from a cardiac and respiratory standpoint.  No change in chronic stable angina which occurs when she climbs a flight of stairs.  No increasing dyspnea.  No change in chronic edema.  Continues in atrial fibrillation with good rate control with diltiazem and metoprolol.    All other systems reviewed and are negative, other than those listed in the HPI.    Pertinent History  Do you have difficulty breathing or chest pain after walking up a flight of stairs: Yes: SOB and chest pain-unchanged  History of obstructive sleep apnea: No  Steroid use in the last 6 months: No  Frequent Aspirin/NSAID use: Yes: Aspirin 81 mg daily  Prior Blood Transfusion: Yes: 2015  Prior Blood Transfusion Reaction: No  If for some reason prior to, during or after the procedure, if it is medically indicated, would you be willing to have a blood transfusion?:  There is no transfusion refusal.    Current Outpatient Medications   Medication Sig Dispense Refill     albuterol (VENTOLIN HFA) 90 mcg/actuation inhaler Inhale 2 puffs every 6 (six) hours as needed for wheezing. 1 Inhaler 2     alendronate (FOSAMAX) 70 MG tablet TAKE ONE TABLET BY MOUTH EVERY 7 DAYS. TAKE IN AM ON EMPTY STOMACH WITH WATER 30MIN B4 FOOD 12 tablet 3     aspirin 81 MG EC tablet Take 81 mg by mouth daily with breakfast.       atorvastatin (LIPITOR) 10 MG tablet TAKE ONE TABLET BY MOUTH ONCE DAILY 90 tablet 1     busPIRone (BUSPAR) 10 MG tablet TAKE 1 TABLET (10 MG TOTAL) BY MOUTH 3  (THREE) TIMES A DAY. 90 tablet 11     busPIRone (BUSPAR) 5 MG tablet 1 tablet 3 times daily as needed for anxiety 60 tablet 0     cholecalciferol, vitamin D3, (CHOLECALCIFEROL) 1,000 unit tablet Take 1,000 Units by mouth daily.       diltiazem (CARDIZEM CD) 180 MG 24 hr capsule Take 1 capsule (180 mg total) by mouth daily. 90 capsule 3     DULoxetine (CYMBALTA) 30 MG capsule TAKE ONE CAPSULE BY MOUTH TWICE A DAY 60 capsule 2     ELIQUIS 5 mg Tab tablet TAKE 1 TABLET (5 MG TOTAL) BY MOUTH 2 (TWO) TIMES A DAY. 60 tablet 2     furosemide (LASIX) 80 MG tablet TAKE ONE-HALF TAB (40MG) BY MOUTH ONCE DAILY 45 tablet 1     levothyroxine (SYNTHROID, LEVOTHROID) 88 MCG tablet Take 1 tablet (88 mcg total) by mouth daily. 90 tablet 3     losartan-hydrochlorothiazide (HYZAAR) 100-25 mg per tablet TAKE ONE TABLET BY MOUTH ONCE DAILY 90 tablet 3     melatonin 3 mg Tab tablet Take 1 tablet (3 mg total) by mouth bedtime as needed.  0     metoprolol succinate (TOPROL XL) 50 MG 24 hr tablet Take 3 tabs p.o. daily. 270 tablet 3     montelukast (SINGULAIR) 10 mg tablet TAKE ONE TABLET BY MOUTH ONCE DAILY 30 tablet 0     multivitamin therapeutic (THERAGRAN) tablet Take 1 tablet by mouth daily.       NITROSTAT 0.4 mg SL tablet Place 0.4 mg under the tongue every 5 (five) minutes as needed for chest pain.        omeprazole (PRILOSEC) 20 MG capsule TAKE ONE CAPSULE BY MOUTH TWICE A DAY 90 capsule 3     peg 400-propylene glycol (SYSTANE) 0.4-0.3 % Drop Administer 1 drop to both eyes 4 (four) times a day as needed (dry eye).        potassium chloride (K-DUR,KLOR-CON) 20 MEQ tablet Take 1 tablet (20 mEq total) by mouth daily. 90 tablet 3     traMADol (ULTRAM) 50 mg tablet TAKE 1 TABLET (50 MG TOTAL) BY MOUTH EVERY 8 (EIGHT) HOURS AS NEEDED FOR PAIN. 60 tablet 1     No current facility-administered medications for this visit.         No Known Allergies    Patient Active Problem List   Diagnosis     Essential hypertension     Coronary Artery  Disease     Carotid Atherosclerosis     Anemia     GI bleeding     Chronic atrial fibrillation (H)     Gastric ulcer     Iron deficiency anemia due to chronic blood loss     Fibromyalgia     COPD (chronic obstructive pulmonary disease) (H)     Hyperlipidemia     Ovarian cancer (H)     Hypothyroidism     AV malformation of GI tract     Anxiety       Past Medical History:   Diagnosis Date     A-fib (H)      Advanced care planning/counseling discussion     DNI     Anemia      Asthma      CAD (coronary artery disease) 2005    stent in 2005     Carotid atherosclerosis      Carpal tunnel syndrome      COPD (chronic obstructive pulmonary disease) (H) 5/7/2015     Fibromyalgia      GI (gastrointestinal bleed) 5/5/2015    Duodenal ulcer      History of transfusion      Hyperlipidemia      Hypertension      Hypothyroidism     Hypothyroid     Murmur, cardiac      Osteoarthritis      Ovarian cancer (H)      Paroxysmal atrial fibrillation (H) 2015    S/P Cardioversion       Past Surgical History:   Procedure Laterality Date     APPENDECTOMY       BACK SURGERY  1995     BILATERAL SALPINGOOPHORECTOMY       COLECTOMY      because of stricture with GI bleeding     DILATION AND CURETTAGE OF UTERUS      6     ESOPHAGOGASTRODUODENOSCOPY N/A 5/6/2015    Procedure: ESOPHAGOGASTRODUODENOSCOPY with gastric biopsy;  Surgeon: Maylin Hunter MD;  Location: [facility];  Service:      ESOPHAGOGASTRODUODENOSCOPY N/A 5/14/2016    Procedure: ESOPHAGOGASTRODUODENOSCOPY with 7french bicap;  Surgeon: Nav Lopez MD;  Location: [facility];  Service:      HYSTERECTOMY      total abdominal     NV INSERT INTRACORONARY STENT      Description: Cath Stent Placement;  Proc Date: 01/01/2005;  Comments: LAD; ; patent at repeat angio 2014       Social History     Socioeconomic History     Marital status:      Spouse name: Not on file     Number of children: 3     Years of education: Not on file     Highest education level: Not on file  "  Occupational History     Not on file   Social Needs     Financial resource strain: Not on file     Food insecurity:     Worry: Not on file     Inability: Not on file     Transportation needs:     Medical: Not on file     Non-medical: Not on file   Tobacco Use     Smoking status: Former Smoker     Packs/day: 0.50     Years: 15.00     Pack years: 7.50     Types: Cigarettes     Last attempt to quit: 1975     Years since quittin.2     Smokeless tobacco: Never Used   Substance and Sexual Activity     Alcohol use: No     Comment: denies     Drug use: No     Sexual activity: No   Lifestyle     Physical activity:     Days per week: Not on file     Minutes per session: Not on file     Stress: Not on file   Relationships     Social connections:     Talks on phone: Not on file     Gets together: Not on file     Attends Nondenominational service: Not on file     Active member of club or organization: Not on file     Attends meetings of clubs or organizations: Not on file     Relationship status: Not on file     Intimate partner violence:     Fear of current or ex partner: Not on file     Emotionally abused: Not on file     Physically abused: Not on file     Forced sexual activity: Not on file   Other Topics Concern     Not on file   Social History Narrative    , lives by herself in a house.  Has 3 children, is DNR. Patients   in .  (last updated 2016)              Objective:     Vitals:    19 1341   BP: 124/70   Pulse: 83   SpO2: 92%   Weight: 143 lb (64.9 kg)   Height: 4' 9\" (1.448 m)         Physical Exam:  HEENT normal  RESPIRATORY: Breathing pattern was normal and the chest moved symmetrically.   Lung sounds were normal and there were no rales or wheezes.  CARDIOVASCULAR: Irregularly irregular with good rate control.  2/6 systolic murmur.    Jugular venous pressure was normal.  1+ bilateral lower extremity edema no carotid bruits.  GASTROINTESTINAL: The abdomen was normal in contour.  " Bowel sounds were present.   Palpation detected no tenderness, mass, or enlarged organs.   SKIN/HAIR/NAILS: No cyanosis or pallor  NEUROLOGIC: Grossly nonfocal  PSYCHIATRIC:  Mood and affect were normal     Patient Instructions     Follow your surgeon's direction on when to stop eating and drinking prior to surgery.  Your surgeon will be managing your pain after your surgery.    Take omeprazole, Toprol-XL, diltiazem, levothyroxine, Cymbalta, and BuSpar on the morning of surgery with small sip of water.  Continue Eliquis although clarify with cardiology whether you should take it on the morning of surgery.  Hold other medications not mentioned above on morning of surgery.          Labs:  Potassium 3.5 creatinine 1.12  Hemoglobin 13.0 platelet 256      Immunization History   Administered Date(s) Administered     Influenza high dose, seasonal 10/03/2017, 10/16/2018     Influenza, inj, historic,unspecified 09/09/2014, 09/20/2016     Pneumo Conj 13-V (2010&after) 11/18/2014     Tdap 09/09/2014     ZOSTER, LIVE 01/01/2013           Electronically signed by Ran Yin MD 03/26/19 1:42 PM

## 2021-05-27 NOTE — TELEPHONE ENCOUNTER
Refill Approved    Rx renewed per Medication Renewal Policy. Medication was last renewed on 4/6/19.    Valencia Velasco, [facility] Triage/Med Refill 4/12/2019     Requested Prescriptions   Pending Prescriptions Disp Refills     DULoxetine (CYMBALTA) 30 MG capsule [Pharmacy Med Name: DULOXETINE   CAP 30MG CAPSULE] 60 capsule      Sig: TAKE ONE CAPSULE BY MOUTH TWICE A DAY       Tricyclics/Misc Antidepressant/Antianxiety Meds Refill Protocol Passed - 4/11/2019 11:53 AM        Passed - PCP or prescribing provider visit in last year     Last office visit with prescriber/PCP: 12/26/2018 Master Leyva MD OR same dept: 2/4/2019 Ran Yin MD OR same specialty: 2/4/2019 Ran Yin MD  Last physical: Visit date not found Last MTM visit: Visit date not found   Next visit within 3 mo: Visit date not found  Next physical within 3 mo: Visit date not found  Prescriber OR PCP: Master Leyva MD  Last diagnosis associated with med order: 1. Fibromyalgia  - DULoxetine (CYMBALTA) 30 MG capsule [Pharmacy Med Name: DULOXETINE   CAP 30MG CAPSULE]; TAKE ONE CAPSULE BY MOUTH TWICE A DAY  Dispense: 60 capsule    If protocol passes may refill for 12 months if within 3 months of last provider visit (or a total of 15 months).             furosemide (LASIX) 80 MG tablet [Pharmacy Med Name: FUROSEMIDE   TAB 80MG TABLET] 45 tablet      Sig: TAKE ONE-HALF TAB (40MG) BY MOUTH ONCE DAILY       Diuretics/Combination Diuretics Refill Protocol  Passed - 4/11/2019 11:53 AM        Passed - Visit with PCP or prescribing provider visit in past 12 months     Last office visit with prescriber/PCP: 12/26/2018 Master Leyva MD OR same dept: 2/4/2019 Ran Yni MD OR same specialty: 2/4/2019 Ran Yin MD  Last physical: Visit date not found Last MTM visit: Visit date not found   Next visit within 3 mo: Visit date not found  Next physical within 3 mo: Visit date not found  Prescriber OR PCP: Master CHAPARRO  MD Gordon  Last diagnosis associated with med order: 1. Fibromyalgia  - DULoxetine (CYMBALTA) 30 MG capsule [Pharmacy Med Name: DULOXETINE   CAP 30MG CAPSULE]; TAKE ONE CAPSULE BY MOUTH TWICE A DAY  Dispense: 60 capsule    If protocol passes may refill for 12 months if within 3 months of last provider visit (or a total of 15 months).             Passed - Serum Potassium in past 12 months      Lab Results   Component Value Date    Potassium 4.2 04/06/2019             Passed - Serum Sodium in past 12 months      Lab Results   Component Value Date    Sodium 134 (L) 04/06/2019             Passed - Blood pressure on file in past 12 months     BP Readings from Last 1 Encounters:   04/09/19 104/78             Passed - Serum Creatinine in past 12 months      Creatinine   Date Value Ref Range Status   04/06/2019 1.32 (H) 0.60 - 1.10 mg/dL Final

## 2021-05-27 NOTE — TELEPHONE ENCOUNTER
Spoke with Ursula BASILIO. Pt will increase furosemide to 40mg daily for 5 days. Then pt will have BMP obtained Monday with Dr. Leyva at her scheduled appointment to reassess her kidney function.     Called pt and discussed recommendations. Clarified current dose of furosemide with the pt. Pt wrote instructions down. Patient verbalizes understanding and agrees with plan. No further questions or concerns at this time.

## 2021-05-27 NOTE — PATIENT INSTRUCTIONS - HE
Your anticoagulation medication (Eliquis):    Resume Eliquis starting today Tuesday April 9th, 5 mg twice daily    Healing from post procedure:    Stay well hydrated, it is important to increase your fluid intake during your recovery period.    Eat foods high in protein to help the healing process.    Gradually increase your activity over the next 7-10 days, back to baseline;  No aggressive exercise for one week.    No lifting more that 10 lb for 7 days after procedure.    You are ok to drive.  Keep your incision sites clean, dry and open to air.    Please call me if any of the following occur:    Shortness of breath, dizziness, or chest pain.    Changes in your groin sites including:  Swelling, hardening, drainage, increase in bruising or an increase in pain.          Call [phone] if you experience any symptoms of a stroke:    Difficulty with speech    Problems walking or with your balance    Vision changes or disturbances    Facial drooping or numbness    Muscle weakness or numbness on one side of your body    Your follow up appointments are as follows:    Primary Provider 4/15/19 as noted    Thank you,  Gardenia Kan RN  [phone]    After hours please contact the on call service at [phone]     WDL

## 2021-05-27 NOTE — PROGRESS NOTES
OFFICE VISIT NOTE    Subjective:   Chief Complaint:  Hospital Visit Follow Up    88-year-old woman who was recently hospitalized to have a watchman procedure. they were unsuccessful deploying that device.  She also had complications of a right groin hematoma.  Eventually, she was sent home on Eliquis and stopping her aspirin.  She still having trouble with fatigue.  She still has some right groin swelling and a lot of swelling of the right leg.  She gets short of breath with exertion. No Recent GI bleeding.    Current Outpatient Medications   Medication Sig     albuterol (VENTOLIN HFA) 90 mcg/actuation inhaler Inhale 2 puffs every 6 (six) hours as needed for wheezing.     alendronate (FOSAMAX) 70 MG tablet TAKE ONE TABLET BY MOUTH EVERY 7 DAYS. TAKE IN AM ON EMPTY STOMACH WITH WATER 30MIN B4 FOOD     apixaban (ELIQUIS) 5 mg Tab tablet Take 1 tablet (5 mg total) by mouth 2 (two) times a day. Begin Tues April 9th if no problems with bleeding     atorvastatin (LIPITOR) 10 MG tablet Take 10 mg by mouth daily.     busPIRone (BUSPAR) 5 MG tablet 1 tablet 3 times daily as needed for anxiety (Patient taking differently: Take 5 mg by mouth 3 (three) times a day as needed (for anxiety).       )     cholecalciferol, vitamin D3, (CHOLECALCIFEROL) 1,000 unit tablet Take 1,000 Units by mouth daily.     DULoxetine (CYMBALTA) 30 MG capsule TAKE ONE CAPSULE BY MOUTH TWICE A DAY     furosemide (LASIX) 80 MG tablet TAKE ONE-HALF TAB (40MG) BY MOUTH ONCE DAILY     levothyroxine (SYNTHROID, LEVOTHROID) 88 MCG tablet Take 1 tablet (88 mcg total) by mouth daily.     melatonin 3 mg Tab tablet Take 1 tablet (3 mg total) by mouth bedtime as needed.     metoprolol succinate (TOPROL-XL) 50 MG 24 hr tablet Take 3 tablets (150 mg total) by mouth daily.     montelukast (SINGULAIR) 10 mg tablet Take 10 mg by mouth at bedtime.     multivitamin therapeutic (THERAGRAN) tablet Take 1 tablet by mouth daily.     NITROSTAT 0.4 mg SL tablet Place 0.4 mg  under the tongue every 5 (five) minutes as needed for chest pain.      omeprazole (PRILOSEC) 20 MG capsule Take 20 mg by mouth 2 (two) times a day before meals.     peg 400-propylene glycol (SYSTANE) 0.4-0.3 % Drop Administer 1 drop to both eyes 4 (four) times a day as needed (dry eye).      potassium chloride (K-DUR,KLOR-CON) 20 MEQ tablet Take 1 tablet (20 mEq total) by mouth daily.     traMADol (ULTRAM) 50 mg tablet TAKE ONE TABLET (50 MG) BY MOUTH EVERY 8 HOURS AS NEEDED FOR PAIN     UNABLE TO FIND Take 2 capsules by mouth daily. Med Name: Omega XL ([email or web address] endorsed product)       PSFHx: Tobacco Status:  She  reports that she quit smoking about 44 years ago. Her smoking use included cigarettes. She has a 7.50 pack-year smoking history. She has never used smokeless tobacco.    Review of Systems:  A comprehensive review of systems is negative except for the comments above    Objective:    LMP  (LMP Unknown)   GENERAL: No acute distress.  Weight is stable.  Blood pressure today is 112/78.  Pulse is 80.  Oxygen saturations 94% on room air.  Still has a hematoma in the right groin.  There is massive swelling Luis Alberto of the right leg all the way down to the ankle.  Lungs are clear.  Heart shows atrial fibrillation with a controlled ventricular rate.  She still appears pale.  Neurologic exam memory is intact.  Speech is normal.  Gait is normal.    Assessment & Plan   Tori oD is a 88 y.o. female.    Still having after effects from the massive hematoma.  She has atrial fibrillation.  She continues on Eliquis.  No more aspirin.  Check hemoglobin and basic metabolic profile.  She would like tramadol refilled from because of pain from fibromyalgia and the right groin hematoma.  I will see her again in 3 weeks.  Check the blood pressure and hemoglobin again at that time.    Diagnoses and all orders for this visit:    Paroxysmal atrial fibrillation (H)    Chronic obstructive pulmonary disease, unspecified COPD type  (H)    Essential hypertension  -     Basic Metabolic Panel    Fibromyalgia  -     traMADol (ULTRAM) 50 mg tablet; TAKE ONE TABLET (50 MG) BY MOUTH EVERY 8 HOURS AS NEEDED FOR PAIN  Dispense: 60 tablet; Refill: 0    Hematoma of groin, initial encounter  -     HM2(CBC w/o Differential)        The following high BMI interventions were performed this visit: encouragement to exercise    Master Leyva MD  Transcription using voice recognition software, may contain typographical errors.

## 2021-05-27 NOTE — ANESTHESIA POSTPROCEDURE EVALUATION
Patient: Tori Do  Structural Aborted Procedure  Anesthesia type: No value filed.    Patient location: Telemetry/Step Down Unit  Last vitals:   Vitals:    04/04/19 1750   BP: (!) 85/70   Pulse: (!) 133   Resp: 21   Temp:    SpO2: 98%     Post vital signs: stable  Level of consciousness: awake and responds to simple questions  Post-anesthesia pain: pain controlled  Post-anesthesia nausea and vomiting: no  Pulmonary: unassisted, spontaneous ventilation, nasal cannula  Cardiovascular: stable, blood pressure at baseline and tachycardic  Hydration: adequate  Anesthetic events: no    QCDR Measures:  ASA# 11 - Juliette-op Cardiac Arrest: ASA11B - Patient did NOT experience unanticipated cardiac arrest  ASA# 12 - Juliette-op Mortality Rate: ASA12B - Patient did NOT die  ASA# 13 - PACU Re-Intubation Rate: ASA13B - Patient did NOT require a new airway mgmt  ASA# 10 - Composite Anes Safety: ASA10A - No serious adverse event    Additional Notes:

## 2021-05-27 NOTE — TELEPHONE ENCOUNTER
----- Message from Prasanna Layne MD sent at 4/1/2019 10:07 AM CDT -----  Regarding: RE: PETEY review  The PETEY from 2015 didn't look at all the views that we would need for measurements. At this point, I would just go ahead and if the patient is ok with some uncertainty, just plan on the PETEY on day of procedure to give us the information.    Thanks    Prasanna    ----- Message -----  From: Gardenia Kan RN  Sent: 3/29/2019   3:36 PM  To: Prasanna Layne MD  Subject: FW: PETEY review                                       ----- Message -----  From: Gardenia Kan RN  Sent: 2/8/2019   4:01 PM  To: Prasanna Layne MD  Subject: PETEY review                                       Dr Layne,   Pt for possible implant 4/3 with you (sooner if possible).  Pt had PETEY back on 5/18/15.   Are you please able to review the images to determine if appendage is appropriate for device, or if she needs to repeat the PETEY?  Thank you

## 2021-05-27 NOTE — ANESTHESIA PREPROCEDURE EVALUATION
Anesthesia Evaluation      Patient summary reviewed   No history of anesthetic complications     Airway   Mallampati: III   Pulmonary - normal exam   (+) COPD, asthma  a smoker                         Cardiovascular   Exercise tolerance: > or = 4 METS  (+) hypertension, CAD, CABG/stent, dysrhythmias, , hypercholesterolemia,     ECG reviewed  Rhythm: irregular  Rate: abnormal,         Neuro/Psych    (+) neuromuscular disease,  chronic pain    Endo/Other    (+) hypothyroidism, obesity,      GI/Hepatic/Renal    (-) esophageal disease    Comments: H/o gi bleed          Dental    (+) poor dentition and chipped            1. Normal left ventricular size and systolic performance with a visually estimated ejection fraction of 60%.   2. There is mild to moderate tricuspid insufficiency.   3. Normal right ventricular size and systolic performance.   4. There is severe left atrial enlargement.   There is moderate right atrial enlargement.            Anesthesia Plan  Planned anesthetic: general endotracheal  -- will place PETEY probe if requested by Cards  -- invasive monitors will be provided by Cards  ASA 4   Induction: intravenous   Anesthetic plan and risks discussed with: patient and child/children  Anesthesia plan special considerations: antiemetics,   Post-op plan: routine recovery

## 2021-05-27 NOTE — PATIENT INSTRUCTIONS - HE
Follow your surgeon's direction on when to stop eating and drinking prior to surgery.  Your surgeon will be managing your pain after your surgery.    Take omeprazole, Toprol-XL, diltiazem, levothyroxine, Cymbalta, and BuSpar on the morning of surgery with small sip of water.  Continue Eliquis although clarify with cardiology whether you should take it on the morning of surgery.  Hold other medications not mentioned above on morning of surgery.

## 2021-05-27 NOTE — ANESTHESIA CARE TRANSFER NOTE
Last vitals:   Vitals:    04/04/19 1504   BP: 106/77   Pulse: (!) 135   Resp: 12   Temp: 36.7  C (98.1  F)   SpO2: 100%     Patient's level of consciousness is awake  Spontaneous respirations: yes  Maintains airway independently: yes  Dentition unchanged: yes  Oropharynx: oropharynx clear of all foreign objects    QCDR Measures:  ASA# 20 - Surgical Safety Checklist: WHO surgical safety checklist completed prior to induction    PQRS# 430 - Adult PONV Prevention: 4558F - Pt received => 2 anti-emetic agents (different classes) preop & intraop  ASA# 8 - Peds PONV Prevention: NA - Not pediatric patient, not GA or 2 or more risk factors NOT present  PQRS# 424 - Juliette-op Temp Management: 4559F - At least one body temp DOCUMENTED => 35.5C or 95.9F within required timeframe  PQRS# 426 - PACU Transfer Protocol: - Transfer of care checklist used  ASA# 14 - Acute Post-op Pain: ASA14B - Patient did NOT experience pain >= 7 out of 10

## 2021-05-27 NOTE — TELEPHONE ENCOUNTER
Controlled Substance Refill Request  Medication:   Requested Prescriptions     Pending Prescriptions Disp Refills     traMADol (ULTRAM) 50 mg tablet [Pharmacy Med Name: TRAMADOL TAB 50MG   AKY TABLET] 60 tablet      Sig: TAKE ONE TABLET (50 MG) BY MOUTH EVERY 8 HOURS AS NEEDED FOR PAIN     Date Last Fill: 2/21/19  Pharmacy: [address]   Submit electronically to pharmacy  Controlled Substance Agreement on File:   Encounter-Level CSA Scan Date:    There are no encounter-level csa scan date.       Last office visit: Last office visit pertaining to requested medication was 3/26/19.

## 2021-05-27 NOTE — TELEPHONE ENCOUNTER
Spoke with pt this am, she would still like to proceed this Thursday as previously noted with implant knowing there is some uncertainty regarding the size of her appendage.

## 2021-05-27 NOTE — PROGRESS NOTES
TCM DISCHARGE FOLLOW UP CALL    Discharge Date:  4/6/2019  Reason for hospital stay (discharge diagnosis)::  Groin hemorrhage and hematoma  Are you feeling better, the same or worse since your discharge?:  Patient is feeling worse  Why are you feeling worse?:  Leg is very swollen and bruised. Heart Care has increased Lasix to 40 mg daily. Pt correctly reported dosing change. Denies orthopnea or PND. She wouldn't be able to get support socks on. Pt has pronounced RO when she answered the phone. States she is shortness of breath doing ADLs. Pt's scale doesn't have a battery.  Do you feel like you have a plan in the event of a health emergency?: Yes (She talks with her family or calls the clinic)    As part of your discharge plan, were  home care services ordered for you?: No    Did you receive any new medications, or was there a change to your medications?: Yes    Are you taking those medications, or do you have any established regiment?:  Pt  Is taking meds as prescribed.  Do you have any follow up visits scheduled with your PCP or Specialist?:  Yes, with PCP (4/15. Offered to move appt up with Dr Leyva but she doesn't have transportation.)  RN NOTES::  Instructed pt to keep an eye on hematoma. If it gets larger, hot, red, or more painful, instructed pt to go to ED. Pt will have someone get her a battery tomorrow. Reviewed how to weigh daily and when to call the doctor.

## 2021-05-28 NOTE — PROGRESS NOTES
OFFICE VISIT NOTE    Subjective:   Chief Complaint:  Follow-up    88-year-old woman in for follow-up regarding her recent angiogram that caused a right groin hematoma.  They were trying to place a watchman device which was unsuccessful.  She has chronic atrial fibrillation.  She continues on blood thinner Eliquis.  No GI bleeding which she has had in the past.  No blood in the urine.  No hemoptysis.  No increasing dyspnea.    Current Outpatient Medications   Medication Sig     albuterol (VENTOLIN HFA) 90 mcg/actuation inhaler Inhale 2 puffs every 6 (six) hours as needed for wheezing.     alendronate (FOSAMAX) 70 MG tablet TAKE ONE TABLET BY MOUTH EVERY 7 DAYS. TAKE IN AM ON EMPTY STOMACH WITH WATER 30MIN B4 FOOD     apixaban (ELIQUIS) 5 mg Tab tablet Take 1 tablet (5 mg total) by mouth 2 (two) times a day. Begin Tues April 9th if no problems with bleeding     atorvastatin (LIPITOR) 10 MG tablet Take 10 mg by mouth daily.     busPIRone (BUSPAR) 5 MG tablet 1 tablet 3 times daily as needed for anxiety (Patient taking differently: Take 5 mg by mouth 3 (three) times a day as needed (for anxiety).       )     cholecalciferol, vitamin D3, (CHOLECALCIFEROL) 1,000 unit tablet Take 1,000 Units by mouth daily.     DILT- mg 24 hr capsule Take 180 mg by mouth daily.     DULoxetine (CYMBALTA) 30 MG capsule TAKE ONE CAPSULE BY MOUTH TWICE A DAY     furosemide (LASIX) 80 MG tablet TAKE ONE-HALF TAB (40MG) BY MOUTH ONCE DAILY     levothyroxine (SYNTHROID, LEVOTHROID) 88 MCG tablet Take 1 tablet (88 mcg total) by mouth daily.     losartan-hydrochlorothiazide (HYZAAR) 100-25 mg per tablet Take 1 tablet by mouth daily.     melatonin 3 mg Tab tablet Take 1 tablet (3 mg total) by mouth bedtime as needed.     metoprolol succinate (TOPROL-XL) 50 MG 24 hr tablet Take 3 tablets (150 mg total) by mouth daily.     montelukast (SINGULAIR) 10 mg tablet Take 10 mg by mouth at bedtime.     multivitamin therapeutic (THERAGRAN) tablet Take 1  "tablet by mouth daily.     NITROSTAT 0.4 mg SL tablet Place 0.4 mg under the tongue every 5 (five) minutes as needed for chest pain.      omeprazole (PRILOSEC) 20 MG capsule Take 20 mg by mouth 2 (two) times a day before meals.     peg 400-propylene glycol (SYSTANE) 0.4-0.3 % Drop Administer 1 drop to both eyes 4 (four) times a day as needed (dry eye).      potassium chloride (K-DUR,KLOR-CON) 20 MEQ tablet Take 1 tablet (20 mEq total) by mouth daily.     traMADol (ULTRAM) 50 mg tablet TAKE ONE TABLET (50 MG) BY MOUTH EVERY 8 HOURS AS NEEDED FOR PAIN     UNABLE TO FIND Take 2 capsules by mouth daily. Med Name: Omega XL ([employer] endorsed product)       PSFHx: Tobacco Status:  She  reports that she quit smoking about 44 years ago. Her smoking use included cigarettes. She has a 7.50 pack-year smoking history. She has never used smokeless tobacco.    Review of Systems:  A comprehensive review of systems is negative except for the comments above    Objective:    Ht 4' 11\" (1.499 m)   Wt 141 lb (64 kg)   LMP  (LMP Unknown)   BMI 28.48 kg/m     GENERAL: No acute distress.  Weight is down 4 pounds.  Today's blood pressure is 118/80.  Oxygen saturations 92%.  Pulse is 88 and irregular.  Right groin hematoma is about 30% smaller than last time.  I thought she also had a cellulitis of seems to have cleared.  There is no oozing.  There is no purulence.  Lungs are clear.  Shoulder seems to be atrial fibrillation but a controlled ventricular rate.  Trace edema of the left leg  2+ edema of the right leg.  Neurologic exam is normal.  She looks more energetic.    Assessment & Plan   Tori Do is a 88 y.o. female.    Clinically slowly improving.  Still has a significant right groin hematoma but it seems to be getting smaller.  We will continue her same medications.  I am checking a hemoglobin as well as a basic metabolic profile today.  To watch for any blood in the stool.  I will see her back in 4 weeks.  May increase " activity.  Diagnoses and all orders for this visit:    Fibromyalgia    Hematoma of groin, subsequent encounter  -     Hemoglobin    Gastrointestinal hemorrhage associated with gastric ulcer    Chronic atrial fibrillation (H)  -     Basic Metabolic Panel        The following high BMI interventions were performed this visit: encouragement to exercise    Master Leyva MD  Transcription using voice recognition software, may contain typographical errors.

## 2021-05-28 NOTE — PROGRESS NOTES
[facility]   Outpatient Follow-up evaluation.     Current Outpatient Medications:      albuterol (VENTOLIN HFA) 90 mcg/actuation inhaler, Inhale 2 puffs every 6 (six) hours as needed for wheezing., Disp: 1 Inhaler, Rfl: 2     alendronate (FOSAMAX) 70 MG tablet, TAKE ONE TABLET BY MOUTH EVERY 7 DAYS. TAKE IN AM ON EMPTY STOMACH WITH WATER 30MIN B4 FOOD, Disp: 12 tablet, Rfl: 3     apixaban (ELIQUIS) 5 mg Tab tablet, Take 1 tablet (5 mg total) by mouth 2 (two) times a day. Begin Tues April 9th if no problems with bleeding, Disp: 60 tablet, Rfl: 2     atorvastatin (LIPITOR) 10 MG tablet, Take 10 mg by mouth daily., Disp: , Rfl:      busPIRone (BUSPAR) 5 MG tablet, 1 tablet 3 times daily as needed for anxiety (Patient taking differently: Take 5 mg by mouth 3 (three) times a day as needed (for anxiety).    ), Disp: 60 tablet, Rfl: 0     cephalexin (KEFLEX) 500 MG capsule, Take 1 capsule (500 mg total) by mouth 4 (four) times a day for 10 days., Disp: 40 capsule, Rfl: 0     cholecalciferol, vitamin D3, (CHOLECALCIFEROL) 1,000 unit tablet, Take 1,000 Units by mouth daily., Disp: , Rfl:      DULoxetine (CYMBALTA) 30 MG capsule, TAKE ONE CAPSULE BY MOUTH TWICE A DAY, Disp: 180 capsule, Rfl: 3     furosemide (LASIX) 80 MG tablet, TAKE ONE-HALF TAB (40MG) BY MOUTH ONCE DAILY, Disp: 45 tablet, Rfl: 3     levothyroxine (SYNTHROID, LEVOTHROID) 88 MCG tablet, Take 1 tablet (88 mcg total) by mouth daily., Disp: 90 tablet, Rfl: 3     melatonin 3 mg Tab tablet, Take 1 tablet (3 mg total) by mouth bedtime as needed., Disp: , Rfl: 0     metoprolol succinate (TOPROL-XL) 50 MG 24 hr tablet, Take 3 tablets (150 mg total) by mouth daily., Disp: 120 tablet, Rfl: 0     montelukast (SINGULAIR) 10 mg tablet, Take 10 mg by mouth at bedtime., Disp: , Rfl:      multivitamin therapeutic (THERAGRAN) tablet, Take 1 tablet by mouth daily., Disp: , Rfl:      NITROSTAT 0.4 mg SL tablet, Place 0.4 mg under the tongue every 5 (five)  minutes as needed for chest pain. , Disp: , Rfl:      omeprazole (PRILOSEC) 20 MG capsule, Take 20 mg by mouth 2 (two) times a day before meals., Disp: , Rfl:      peg 400-propylene glycol (SYSTANE) 0.4-0.3 % Drop, Administer 1 drop to both eyes 4 (four) times a day as needed (dry eye). , Disp: , Rfl:      potassium chloride (K-DUR,KLOR-CON) 20 MEQ tablet, Take 1 tablet (20 mEq total) by mouth daily., Disp: 90 tablet, Rfl: 3     traMADol (ULTRAM) 50 mg tablet, TAKE ONE TABLET (50 MG) BY MOUTH EVERY 8 HOURS AS NEEDED FOR PAIN, Disp: 60 tablet, Rfl: 0     UNABLE TO FIND, Take 2 capsules by mouth daily. Med Name: Omega XL ([employer] endorsed product), Disp: , Rfl:         Tori Do is a 88 y.o. Female    Chief Complaint   Patient presents with     Follow-up       Diagnoses:(See Problem list)        resolving groin hematoma large.  Patient on antibiotics.    Recommendations:    Ultrasound to assess the right coronary. Doubt abscess given clnical presentation, assess ULS for fluid accumulation.      Subjective: 88-year-old woman who returns for follow-up has chronic atrial fibrillation seen in A. fib clinic.  Currently has been on Eliquis and rate control therapy.  She had Vascor of 4.  Recent echo shows normal left ventricular function EF of 70% very small effusion but otherwise normal findings  PETEY found a small left atrial appendage without thrombus.  Patient had attempted watchman device placement procedure but this was not successful.  Procedure complicated by large right groin hematoma.      Past Medical History:   Diagnosis Date     Anemia      Asthma      Carpal tunnel syndrome      Fibromyalgia      History of transfusion      Hyperlipidemia      Hypothyroidism     Hypothyroid     Murmur, cardiac      Osteoarthritis      Ovarian cancer (H)      Paroxysmal atrial fibrillation (H) 2015    S/P Cardioversion     Past Surgical History:   Procedure Laterality Date     APPENDECTOMY       BACK SURGERY  1995      BILATERAL SALPINGOOPHORECTOMY       COLECTOMY      because of stricture with GI bleeding     DILATION AND CURETTAGE OF UTERUS      6     ESOPHAGOGASTRODUODENOSCOPY N/A 2015    Procedure: ESOPHAGOGASTRODUODENOSCOPY with gastric biopsy;  Surgeon: Maylin Hunter MD;  Location: [facility];  Service:      ESOPHAGOGASTRODUODENOSCOPY N/A 2016    Procedure: ESOPHAGOGASTRODUODENOSCOPY with 7french bicap;  Surgeon: Nav Lopez MD;  Location: [facility];  Service:      HYSTERECTOMY      total abdominal     SD INSERT INTRACORONARY STENT      Description: Cath Stent Placement;  Proc Date: 2005;  Comments: LAD; ; patent at repeat angio      No Known Allergies  Family History   Problem Relation Age of Onset     Colon cancer Mother          age 53     Heart disease Father          age 96     Asthma Brother       Social History     Socioeconomic History     Marital status:      Spouse name: Not on file     Number of children: 3     Years of education: Not on file     Highest education level: Not on file   Occupational History     Not on file   Social Needs     Financial resource strain: Not on file     Food insecurity:     Worry: Not on file     Inability: Not on file     Transportation needs:     Medical: Not on file     Non-medical: Not on file   Tobacco Use     Smoking status: Former Smoker     Packs/day: 0.50     Years: 15.00     Pack years: 7.50     Types: Cigarettes     Last attempt to quit: 1975     Years since quittin.3     Smokeless tobacco: Never Used   Substance and Sexual Activity     Alcohol use: No     Comment: denies     Drug use: No     Sexual activity: Never   Lifestyle     Physical activity:     Days per week: Not on file     Minutes per session: Not on file     Stress: Not on file   Relationships     Social connections:     Talks on phone: Not on file     Gets together: Not on file     Attends Caodaism service: Not on file     Active member of club or  organization: Not on file     Attends meetings of clubs or organizations: Not on file     Relationship status: Not on file     Intimate partner violence:     Fear of current or ex partner: Not on file     Emotionally abused: Not on file     Physically abused: Not on file     Forced sexual activity: Not on file   Other Topics Concern     Not on file   Social History Narrative    , lives by herself in a house.  Has 3 children, is DNR. Patients   in .  (last updated 2016)          Objective:   LMP  (LMP Unknown)       Wt Readings from Last 3 Encounters:   19 143 lb (64.9 kg)   19 151 lb (68.5 kg)   19 150 lb 11.2 oz (68.4 kg)     BP Readings from Last 3 Encounters:   19 118/70   04/15/19 112/78   19 104/78     Pulse Readings from Last 3 Encounters:   19 88   04/15/19 80   19 68     General appearance: alert, appears stated age and cooperative  Head: Normocephalic, without obvious abnormality, atraumatic  Eyes: Normal external exam without jaundice.  Ears: Normal external auricular exam.  Nose: Normal external exam.  Lungs: CLess than 10 mL of urine lear to auscultation bilaterally  Chest wall: no tenderness  Heart: Irregular rate and rhythm, S1, S2 normal, no murmur, click, rub or gallop   Pulses: 2+ and symmetric  Skin: Skin color, texture, turgor normal.   Neurologic: Grossly normal, no focal neurologic findings.  Large mass over right groin area.  No obvious bruit heard but the area is very tender.  Edema the entire right lower leg.    Review of Systems:      Cardiographics: Reviewed in clinic.    Lab Results:  Lab Results: Personally reviewed  Office Visit on 04/15/2019   Component Date Value     Sodium 04/15/2019 141      Potassium 04/15/2019 3.8      Chloride 04/15/2019 97*     CO2 04/15/2019 31      Anion Gap, Calculation 04/15/2019 13      Glucose 04/15/2019 105      Calcium 04/15/2019 9.8      BUN 04/15/2019 28      Creatinine 04/15/2019  1.29*     GFR MDRD Af Amer 04/15/2019 47*     GFR MDRD Non Af Amer 04/15/2019 39*     WBC 04/15/2019 6.5      RBC 04/15/2019 2.98*     Hemoglobin 04/15/2019 9.9*     Hematocrit 04/15/2019 29.2*     MCV 04/15/2019 98      MCH 04/15/2019 33.4      MCHC 04/15/2019 34.1      RDW 04/15/2019 13.0      Platelets 04/15/2019 306      MPV 04/15/2019 6.8*   Admission on 04/04/2019, Discharged on 04/06/2019   Component Date Value     ROBERT width 90 04/04/2019 23.0      ROBERT width 45 04/04/2019 19.7      ROBERT width 0 04/04/2019 23.2      ROBERT length 45 04/04/2019 23.4      ROBERT length 90 04/04/2019 22.2      ROBERT length 135 04/04/2019 17.0      ROBERT length 0 04/04/2019 24.9      ROBERT width 135 04/04/2019 20.5      BSA 04/04/2019 1.6      Hieght 04/04/2019 57      Weight 04/04/2019 2,240      BP 04/04/2019 106/77      HR 04/04/2019 135      Height 04/04/2019 57.0      Weight 04/04/2019 140      Sodium 04/04/2019 140      Potassium 04/04/2019 3.7      Chloride 04/04/2019 98      CO2 04/04/2019 32*     Anion Gap, Calculation 04/04/2019 10      Glucose 04/04/2019 124      Calcium 04/04/2019 9.7      BUN 04/04/2019 19      Creatinine 04/04/2019 0.97      GFR MDRD Af Amer 04/04/2019 >60      GFR MDRD Non Af Amer 04/04/2019 54*     VENTRICULAR RATE 04/04/2019 99      ATRIAL RATE 04/04/2019 102      QRS DURATION 04/04/2019 102      Q-T INTERVAL 04/04/2019 368      QTC CALCULATION (BEZET) 04/04/2019 472      R AXIS 04/04/2019 -7      T AXIS 04/04/2019 -34      MUSE DIAGNOSIS 04/04/2019                      Value:Atrial fibrillation  Nonspecific ST and T wave abnormality , probably digitalis effect  Prolonged QT  Abnormal ECG  When compared with ECG of 13-DEC-2018 16:01,  Nonspecific T wave abnormality has replaced inverted T waves in Lateral leads  Confirmed by PEDRO MILLER MD LOC:JN ([id number]) on 4/4/2019 3:19:33 PM       WBC 04/04/2019 6.2      RBC 04/04/2019 3.87      Hemoglobin 04/04/2019 12.6      Hematocrit 04/04/2019 38.7      MCV 04/04/2019  100      MCH 04/04/2019 32.6      MCHC 04/04/2019 32.6      RDW 04/04/2019 12.5      Platelets 04/04/2019 247      MPV 04/04/2019 9.3      ABORh 04/04/2019 A POS      Antibody Screen 04/04/2019 Negative      Activated Clotting Time * 04/04/2019 391*     Activated Clotting Time * 04/04/2019 278*     Crossmatch 04/06/2019 Compatible      Blood Expiration Date 04/06/2019 [id number]      Unit Type 04/06/2019 A Pos      Unit Number 04/06/2019 [id number]      Status 04/06/2019 Transfused      Component 04/06/2019 Red Blood Cells      PRODUCT CODE 04/06/2019 [id number]      Issue Date and Time 04/06/2019 [id number]      Blood Type 04/06/2019 6200      CODING SYSTEM 04/06/2019 [id number]      Troponin I 04/04/2019 0.10      Sodium 04/04/2019 139      Potassium 04/04/2019 4.4      Chloride 04/04/2019 106      CO2 04/04/2019 21*     Anion Gap, Calculation 04/04/2019 12      Glucose 04/04/2019 160*     Calcium 04/04/2019 7.8*     BUN 04/04/2019 20      Creatinine 04/04/2019 0.98      GFR MDRD Af Amer 04/04/2019 >60      GFR MDRD Non Af Amer 04/04/2019 54*     VENTRICULAR RATE 04/04/2019 128      QRS DURATION 04/04/2019 100      Q-T INTERVAL 04/04/2019 354      QTC CALCULATION (BEZET) 04/04/2019 516      R AXIS 04/04/2019 82      T AXIS 04/04/2019 -39      MUSE DIAGNOSIS 04/04/2019                      Value:Atrial fibrillation with rapid ventricular response  Nonspecific ST and T wave abnormality  Prolonged QT  Abnormal ECG  When compared with ECG of 04-APR-2019 09:46,  Inverted T waves have replaced nonspecific T wave abnormality in Anterolateral leads  QT has lengthened  Confirmed by SUMIT ROTH MD LOC:JN [id number]) on 4/5/2019 9:35:09 AM       WBC 04/04/2019 12.2*     RBC 04/04/2019 2.90*     Hemoglobin 04/04/2019 9.6*     Hematocrit 04/04/2019 28.9*     MCV 04/04/2019 100      MCH 04/04/2019 33.1      MCHC 04/04/2019 33.2      RDW 04/04/2019 12.7      Platelets 04/04/2019 209      MPV 04/04/2019 9.4      Neutrophils  % 04/04/2019 90*     Lymphocytes % 04/04/2019 8*     Monocytes % 04/04/2019 2      Eosinophils % 04/04/2019 0      Basophils % 04/04/2019 0      Neutrophils Absolute 04/04/2019 10.9*     Lymphocytes Absolute 04/04/2019 1.0      Monocytes Absolute 04/04/2019 0.2      Eosinophils Absolute 04/04/2019 0.0      Basophils Absolute 04/04/2019 0.0      Glucose 04/04/2019 136      Anti-Xa Heparin Assay 04/05/2019 >=1.10*     Hemoglobin 04/05/2019 8.4*     Anti-Xa Heparin Assay 04/05/2019 >=1.10*     Fibrinogen 04/05/2019 288      Hemoglobin 04/05/2019 10.2*     Creatinine 04/05/2019 0.97      GFR MDRD Af Amer 04/05/2019 >60      GFR MDRD Non Af Amer 04/05/2019 54*     Potassium 04/05/2019 4.3      LV volume diastolic 04/05/2019 30*     LV volume systolic 04/05/2019 9*     HR 04/05/2019 118      IVSd 04/05/2019 1.2*     LVIDd 04/05/2019 3.2*     LVIDs 04/05/2019 2*     LV PWd 04/05/2019 1.2*     LA size 04/05/2019 4.2      LA area 2 04/05/2019 21.5      LA area 1 04/05/2019 35.6      LA length 04/05/2019 6.2      BSA 04/05/2019 1.6      Hieght 04/05/2019 57      Weight 04/05/2019 2,388.8      BP 04/05/2019 95/72      IVS/PW ratio 04/05/2019 1.0      LV FS 04/05/2019 37.5      Echo LVEF calculated 04/05/2019 70      LA volume 04/05/2019 104.9      LV mass 04/05/2019 119.4      LV systolic volume index 04/05/2019 5.6      LV diastolic volume index 04/05/2019 18.8      LA volume index 04/05/2019 65.6      LV mass index 04/05/2019 74.7      Height 04/05/2019 57.0      Weight 04/05/2019 149      Glucose 04/04/2019 169*     Hemoglobin 04/06/2019 9.4*     Sodium 04/06/2019 134*     Potassium 04/06/2019 4.2      Chloride 04/06/2019 103      CO2 04/06/2019 23      Anion Gap, Calculation 04/06/2019 8      Glucose 04/06/2019 108      Calcium 04/06/2019 7.7*     BUN 04/06/2019 31*     Creatinine 04/06/2019 1.32*     GFR MDRD Af Amer 04/06/2019 46*     GFR MDRD Non Af Amer 04/06/2019 38*     Glucose 04/06/2019 121    Physical on  03/26/2019   Component Date Value     WBC 03/26/2019 6.4      RBC 03/26/2019 4.00      Hemoglobin 03/26/2019 13.0      Hematocrit 03/26/2019 38.2      MCV 03/26/2019 96      MCH 03/26/2019 32.6      MCHC 03/26/2019 34.1      RDW 03/26/2019 11.6      Platelets 03/26/2019 256      MPV 03/26/2019 6.9*     Sodium 03/26/2019 139      Potassium 03/26/2019 3.5      Chloride 03/26/2019 97*     CO2 03/26/2019 28      Anion Gap, Calculation 03/26/2019 14      Glucose 03/26/2019 102      Calcium 03/26/2019 9.9      BUN 03/26/2019 30*     Creatinine 03/26/2019 1.12*     GFR MDRD Af Amer 03/26/2019 56*     GFR MDRD Non Af Amer 03/26/2019 46*   Office Visit on 02/04/2019   Component Date Value     Hemoglobin 02/04/2019 12.9      TSH 02/04/2019 2.43      Sodium 02/04/2019 139      Potassium 02/04/2019 4.5      Chloride 02/04/2019 96*     CO2 02/04/2019 29      Anion Gap, Calculation 02/04/2019 14      Glucose 02/04/2019 109      Calcium 02/04/2019 10.2      BUN 02/04/2019 25      Creatinine 02/04/2019 1.24*     GFR MDRD Af Amer 02/04/2019 50*     GFR MDRD Non Af Amer 02/04/2019 41*   Office Visit on 12/26/2018   Component Date Value     WBC 12/26/2018 6.5      RBC 12/26/2018 4.00      Hemoglobin 12/26/2018 13.1      Hematocrit 12/26/2018 39.6      MCV 12/26/2018 99      MCH 12/26/2018 32.8      MCHC 12/26/2018 33.1      RDW 12/26/2018 11.9      Platelets 12/26/2018 275      MPV 12/26/2018 6.9*     Sodium 12/26/2018 141      Potassium 12/26/2018 3.6      Chloride 12/26/2018 99      CO2 12/26/2018 32*     Anion Gap, Calculation 12/26/2018 10      Glucose 12/26/2018 112      Calcium 12/26/2018 9.7      BUN 12/26/2018 27      Creatinine 12/26/2018 1.10      GFR MDRD Af Amer 12/26/2018 57*     GFR MDRD Non Af Amer 12/26/2018 47*   Office Visit on 12/13/2018   Component Date Value     VENTRICULAR RATE 12/13/2018 94      ATRIAL RATE 12/13/2018 96      QRS DURATION 12/13/2018 112      Q-T INTERVAL 12/13/2018 382      QTC CALCULATION  (BEZET) 12/13/2018 477      R AXIS 12/13/2018 2      T AXIS 12/13/2018 260      MUSE DIAGNOSIS 12/13/2018                      Value:Atrial fibrillation with premature ventricular or aberrantly conducted complexes  ST & T wave abnormality, consider lateral ischemia or digitalis effect  Prolonged QT  Abnormal ECG  When compared with ECG of 30-OCT-2018 13:27,  Inverted T waves have replaced nonspecific T wave abnormality in Lateral leads  Premature ventricular complexes now present  Confirmed by YOGESH MONTEZ, KRISTA LOC: ([id number]) on 12/14/2018 3:33:01 PM     Office Visit on 12/11/2018   Component Date Value     Sodium 12/11/2018 140      Potassium 12/11/2018 3.8      Chloride 12/11/2018 97*     CO2 12/11/2018 28      Anion Gap, Calculation 12/11/2018 15      Glucose 12/11/2018 112      Calcium 12/11/2018 10.2      BUN 12/11/2018 25      Creatinine 12/11/2018 1.00      GFR MDRD Af Amer 12/11/2018 >60      GFR MDRD Non Af Amer 12/11/2018 52*     WBC 12/11/2018 6.1      RBC 12/11/2018 3.89      Hemoglobin 12/11/2018 12.7      Hematocrit 12/11/2018 38.6      MCV 12/11/2018 99      MCH 12/11/2018 32.6      MCHC 12/11/2018 32.9      RDW 12/11/2018 13.0      Platelets 12/11/2018 238      MPV 12/11/2018 9.3      Neutrophils % 12/11/2018 67      Lymphocytes % 12/11/2018 19*     Monocytes % 12/11/2018 12*     Eosinophils % 12/11/2018 2      Basophils % 12/11/2018 0      Neutrophils Absolute 12/11/2018 4.1      Lymphocytes Absolute 12/11/2018 1.2      Monocytes Absolute 12/11/2018 0.7      Eosinophils Absolute 12/11/2018 0.1      Basophils Absolute 12/11/2018 0.0    Hospital Outpatient Visit on 11/29/2018   Component Date Value     LV volume diastolic 11/29/2018 46      LV volume systolic 11/29/2018 19      HR 11/29/2018 110      IVSd 11/29/2018 0.9      LVIDd 11/29/2018 4.1      LVIDs 11/29/2018 2.8      LVOT diam 11/29/2018 2      LVOT mean gradient 11/29/2018 3      LVOT peak VTI 11/29/2018 20      LVOT mean ga 11/29/2018 74.9       LVOT peak ga 11/29/2018 115      LVOT peak gradient 11/29/2018 5      LV PWd 11/29/2018 1.4*     MV E' lat ga 11/29/2018 10.2      MV E' med ga 11/29/2018 6.75      AO root 11/29/2018 2.4      LA size 11/29/2018 5      LA/AO root ratio 11/29/2018 2.08      MV decel time 11/29/2018 204      MV peak E ga 11/29/2018 124      TR peak ga 11/29/2018 289      LA area 2 11/29/2018 30.1      LA area 1 11/29/2018 33      LA length 11/29/2018 6.95      TAPSE 11/29/2018 1.6      BSA 11/29/2018 1.63      Hieght 11/29/2018 58      Weight 11/29/2018 2,288      BP 11/29/2018 130/70      IVS/PW ratio 11/29/2018 0.6      TR peak gradent 11/29/2018 33.4      LV FS 11/29/2018 31.7      Echo LVEF calculated 11/29/2018 59      LA volume 11/29/2018 121.5      LV mass 11/29/2018 161.4      LVOT area 11/29/2018 3.14      LVOT SV 11/29/2018 62.8      LV systolic volume index 11/29/2018 11.7      LV diastolic volume index 11/29/2018 28.2      LA volume index 11/29/2018 74.5      LV mass index 11/29/2018 99.0      LV SVi 11/29/2018 38.5      MV med E/e' ratio 11/29/2018 18.4      MV lat E/e' ratio 11/29/2018 12.2      LV CO 11/29/2018 6.9      LV Ci 11/29/2018 4.2      Height 11/29/2018 58.0      Weight 11/29/2018 143      MV Avg E/e' Ratio 11/29/2018 14.6    Office Visit on 11/13/2018   Component Date Value     Sodium 11/13/2018 139      Potassium 11/13/2018 3.5      Chloride 11/13/2018 90*     CO2 11/13/2018 33*     Anion Gap, Calculation 11/13/2018 16      Glucose 11/13/2018 109      Calcium 11/13/2018 10.5      BUN 11/13/2018 29*     Creatinine 11/13/2018 1.22*     GFR MDRD Af Amer 11/13/2018 51*     GFR MDRD Non Af Amer 11/13/2018 42*     WBC 11/13/2018 6.4      RBC 11/13/2018 3.93      Hemoglobin 11/13/2018 12.8      Hematocrit 11/13/2018 38.5      MCV 11/13/2018 98      MCH 11/13/2018 32.5      MCHC 11/13/2018 33.1      RDW 11/13/2018 12.1      Platelets 11/13/2018 271      MPV 11/13/2018 7.4      Lab Results   Component Value  Date     04/15/2019    K 3.8 04/15/2019    CL 97 (L) 04/15/2019    CO2 31 04/15/2019    BUN 28 04/15/2019    CREATININE 1.29 (H) 04/15/2019    CALCIUM 9.8 04/15/2019       Clinical evaluation time today including exam 35 minutes.  At least 50% of clinic evaluation time involved in assessment and patient counseling.  Part of this chart was created using a dictation software.  Typographic errors, word substitutions, and grammatical errors may unintentionally occur.    Willy Wong M.D.  [facility]

## 2021-05-28 NOTE — TELEPHONE ENCOUNTER
Refill Approved    Rx renewed per Medication Renewal Policy. Medication was last renewed on 4/2/19.    Valencia Velasco, [facility] Connection Triage/Med Refill 5/7/2019     Requested Prescriptions   Pending Prescriptions Disp Refills     atorvastatin (LIPITOR) 10 MG tablet [Pharmacy Med Name: ATORVASTATIN 10MG TAB TABLET] 90 tablet      Sig: TAKE ONE TABLET BY MOUTH ONCE DAILY       Statins Refill Protocol (Hmg CoA Reductase Inhibitors) Passed - 5/7/2019  5:51 PM        Passed - PCP or prescribing provider visit in past 12 months      Last office visit with prescriber/PCP: Visit date not found OR same dept: 5/6/2019 Master Leyva MD OR same specialty: 5/6/2019 Master Leyva MD  Last physical: Visit date not found Last MTM visit: Visit date not found   Next visit within 3 mo: Visit date not found  Next physical within 3 mo: Visit date not found  Prescriber OR PCP: Lobito Sousa MD  Last diagnosis associated with med order: There are no diagnoses linked to this encounter.  If protocol passes may refill for 12 months if within 3 months of last provider visit (or a total of 15 months).

## 2021-05-28 NOTE — TELEPHONE ENCOUNTER
Refill Approved    Rx renewed per Medication Renewal Policy. Medication was last renewed on 4/6/19.    Valencia Velasco, [facility] Connection Triage/Med Refill 5/7/2019     Requested Prescriptions   Pending Prescriptions Disp Refills     metoprolol succinate (TOPROL-XL) 50 MG 24 hr tablet [Pharmacy Med Name: METOPROL SUC TAB 50MG ER TABLET] 120 tablet 0     Sig: TAKE 3 TABLETS BY MOUTH DAILY       Beta-Blockers Refill Protocol Passed - 5/7/2019  5:52 PM        Passed - PCP or prescribing provider visit in past 12 months or next 3 months     Last office visit with prescriber/PCP: 2/4/2019 Ran Yin MD OR same dept: 5/6/2019 Master Leyva MD OR same specialty: 5/6/2019 Master Leyva MD  Last physical: 3/26/2019 Last MTM visit: Visit date not found   Next visit within 3 mo: Visit date not found  Next physical within 3 mo: Visit date not found  Prescriber OR PCP: Ran Yin MD  Last diagnosis associated with med order: 1. Chronic atrial fibrillation (H)  - metoprolol succinate (TOPROL-XL) 50 MG 24 hr tablet [Pharmacy Med Name: METOPROL SUC TAB 50MG ER TABLET]; TAKE 3 TABLETS BY MOUTH DAILY  Dispense: 120 tablet; Refill: 0    If protocol passes may refill for 12 months if within 3 months of last provider visit (or a total of 15 months).             Passed - Blood pressure filed in past 12 months     BP Readings from Last 1 Encounters:   05/06/19 118/80

## 2021-05-28 NOTE — PROGRESS NOTES
OFFICE VISIT NOTE    Subjective:   Chief Complaint:  Leg Pain (right leg)    This is an 88-year-old woman with several problems including chronic atrial fibrillation, large right groin hematoma after an attempt to place a watchman device in the heart.  This failed and she did have a significant bleed in the groin.  Other problems in the past include hypertension and chronic pain from fibromyalgia.  The past 2 days she has noted increasing discomfort in the groin.  Just felt a little ill.  No definite fever but occasional minor chills.  She noticed more swelling in the right groin.    Current Outpatient Medications   Medication Sig     albuterol (VENTOLIN HFA) 90 mcg/actuation inhaler Inhale 2 puffs every 6 (six) hours as needed for wheezing.     alendronate (FOSAMAX) 70 MG tablet TAKE ONE TABLET BY MOUTH EVERY 7 DAYS. TAKE IN AM ON EMPTY STOMACH WITH WATER 30MIN B4 FOOD     apixaban (ELIQUIS) 5 mg Tab tablet Take 1 tablet (5 mg total) by mouth 2 (two) times a day. Begin Tues April 9th if no problems with bleeding     atorvastatin (LIPITOR) 10 MG tablet Take 10 mg by mouth daily.     busPIRone (BUSPAR) 5 MG tablet 1 tablet 3 times daily as needed for anxiety (Patient taking differently: Take 5 mg by mouth 3 (three) times a day as needed (for anxiety).       )     cephalexin (KEFLEX) 500 MG capsule Take 1 capsule (500 mg total) by mouth 4 (four) times a day for 10 days.     cholecalciferol, vitamin D3, (CHOLECALCIFEROL) 1,000 unit tablet Take 1,000 Units by mouth daily.     DULoxetine (CYMBALTA) 30 MG capsule TAKE ONE CAPSULE BY MOUTH TWICE A DAY     furosemide (LASIX) 80 MG tablet TAKE ONE-HALF TAB (40MG) BY MOUTH ONCE DAILY     levothyroxine (SYNTHROID, LEVOTHROID) 88 MCG tablet Take 1 tablet (88 mcg total) by mouth daily.     melatonin 3 mg Tab tablet Take 1 tablet (3 mg total) by mouth bedtime as needed.     metoprolol succinate (TOPROL-XL) 50 MG 24 hr tablet Take 3 tablets (150 mg total) by mouth daily.      "montelukast (SINGULAIR) 10 mg tablet Take 10 mg by mouth at bedtime.     multivitamin therapeutic (THERAGRAN) tablet Take 1 tablet by mouth daily.     NITROSTAT 0.4 mg SL tablet Place 0.4 mg under the tongue every 5 (five) minutes as needed for chest pain.      omeprazole (PRILOSEC) 20 MG capsule Take 20 mg by mouth 2 (two) times a day before meals.     peg 400-propylene glycol (SYSTANE) 0.4-0.3 % Drop Administer 1 drop to both eyes 4 (four) times a day as needed (dry eye).      potassium chloride (K-DUR,KLOR-CON) 20 MEQ tablet Take 1 tablet (20 mEq total) by mouth daily.     traMADol (ULTRAM) 50 mg tablet TAKE ONE TABLET (50 MG) BY MOUTH EVERY 8 HOURS AS NEEDED FOR PAIN     UNABLE TO FIND Take 2 capsules by mouth daily. Med Name: Omega XL ([employer] endorsed product)       PSFHx: Tobacco Status:  She  reports that she quit smoking about 44 years ago. Her smoking use included cigarettes. She has a 7.50 pack-year smoking history. She has never used smokeless tobacco.    Review of Systems:  A comprehensive review of systems is negative except for the comments above    Objective:    Ht 4' 9\" (1.448 m)   Wt 143 lb (64.9 kg)   LMP  (LMP Unknown)   BMI 30.94 kg/m    GENERAL: No acute distress.  Temperature is 98.4.  Blood pressure 118/70.  Pulse 88.  Oxygen saturations 95% on room air.  Lungs seem clear.  Heart shows persistent atrial fibrillation.  Heart rate in the upper 80s.  Melanie significant ecchymoses and edema especially in the right lower leg.  Examination of the right groin does reveal a crease in the size of the swelling as well as increased temperature and erythema.  There is tenderness suggesting cellulitis.  There is no drainage no fluctuance.  Certainly no purulent drainage noted.  Still has mild to moderate amount of hematoma in the right groin with discoloration of the skin.    Assessment & Plan   Tori Do is a 88 y.o. female.    This looks like she is developed a cellulitis in the previous " angiogram puncture site.  The size of the hematoma is less but she still has some swelling and some discoloration.  There is also significant swelling down in the ankle and calf because of gravity drainage from the hematoma.  No evidence of circulation impairment.  I recommended warm packs 4 times daily for about 30 minutes.  Also start her on Keflex 500 mg p.o. 4 times daily.  She should limit her activity for a day or 2 to let the swelling go down.  I warned her that she has any shaking chills with high fevers she should go to the emergency room.  He has an appointment to see me again in about 12-13 days.  She should keep that appointment.  Blood pressure pills and medications for atrial fibrillation remain exactly the same.  Diagnoses and all orders for this visit:    Cellulitis of right lower extremity  -     cephalexin (KEFLEX) 500 MG capsule; Take 1 capsule (500 mg total) by mouth 4 (four) times a day for 10 days.  Dispense: 40 capsule; Refill: 0    Chronic atrial fibrillation (H)    Essential hypertension    Hematoma of groin, initial encounter        The following high BMI interventions were performed this visit: encouragement to exercise    Master Leyva MD  Transcription using voice recognition software, may contain typographical errors.

## 2021-05-29 ENCOUNTER — RECORDS - HEALTHEAST (OUTPATIENT)
Dept: ADMINISTRATIVE | Facility: CLINIC | Age: 86
End: 2021-05-29

## 2021-05-29 NOTE — TELEPHONE ENCOUNTER
Refill Approved    Rx renewed per Medication Renewal Policy. Medication was last renewed on 3/19/19.    Valencia Velasco, [facility] Triage/Med Refill 6/12/2019     Requested Prescriptions   Pending Prescriptions Disp Refills     albuterol (PROAIR HFA;PROVENTIL HFA;VENTOLIN HFA) 90 mcg/actuation inhaler [Pharmacy Med Name: ALBUTEROL    AER HFA  INHALANT] 18 each      Sig: INHALE 2 PUFFS BY MOUTH EVERY 6 HOURS AS NEEDED FOR WHEEZING       Albuterol/Levalbuterol Refill Protocol Passed - 6/11/2019 11:40 AM        Passed - PCP or prescribing provider visit in last year     Last office visit with prescriber/PCP: 6/10/2019 Master Leyva MD OR same dept: 6/10/2019 Master Leyva MD OR same specialty: 6/10/2019 Master Leyva MD Last physical: Visit date not found       Next appt within 3 mo: Visit date not found  Next physical within 3 mo: Visit date not found  Prescriber OR PCP: Master Leyva MD  Last diagnosis associated with med order: 1. Chronic obstructive pulmonary disease, unspecified COPD type (H)  - albuterol (PROAIR HFA;PROVENTIL HFA;VENTOLIN HFA) 90 mcg/actuation inhaler [Pharmacy Med Name: ALBUTEROL    AER HFA  INHALANT]; INHALE 2 PUFFS BY MOUTH EVERY 6 HOURS AS NEEDED FOR WHEEZING  Dispense: 18 each    If protocol passes may refill for 6 months if within 3 months of last provider visit (or a total of 9 months). If patient requesting >1 inhaler per month refill x 6 months and have patient make appointment with provider.

## 2021-05-29 NOTE — PROGRESS NOTES
OFFICE VISIT NOTE    Subjective:   Chief Complaint:  Follow-up (1 months)    Follow-up regarding history of atrial fibrillation, large right groin hematoma after attempt at placement of a watchman device, hypertension, COPD, chronic muscle pain from fibromyalgia.  The right groin swelling is decreased markedly.  She no longer has severe pain in that region.  She has generalized muscle aching from fibromyalgia.  Mild shortness of breath.  No fevers or chills.  No hemoptysis.  No bleeding while she is taking blood thinners.    Current Outpatient Medications   Medication Sig     albuterol (VENTOLIN HFA) 90 mcg/actuation inhaler Inhale 2 puffs every 6 (six) hours as needed for wheezing.     alendronate (FOSAMAX) 70 MG tablet TAKE ONE TABLET BY MOUTH EVERY 7 DAYS. TAKE IN AM ON EMPTY STOMACH WITH WATER 30MIN B4 FOOD     apixaban (ELIQUIS) 5 mg Tab tablet Take 1 tablet (5 mg total) by mouth 2 (two) times a day. Begin Tues April 9th if no problems with bleeding     atorvastatin (LIPITOR) 10 MG tablet TAKE ONE TABLET BY MOUTH ONCE DAILY     busPIRone (BUSPAR) 5 MG tablet 1 tablet 3 times daily as needed for anxiety (Patient taking differently: Take 5 mg by mouth 3 (three) times a day as needed (for anxiety).       )     cholecalciferol, vitamin D3, (CHOLECALCIFEROL) 1,000 unit tablet Take 1,000 Units by mouth daily.     DILT- mg 24 hr capsule Take 180 mg by mouth daily.     DULoxetine (CYMBALTA) 30 MG capsule TAKE ONE CAPSULE BY MOUTH TWICE A DAY     furosemide (LASIX) 80 MG tablet TAKE ONE-HALF TAB (40MG) BY MOUTH ONCE DAILY     levothyroxine (SYNTHROID, LEVOTHROID) 88 MCG tablet Take 1 tablet (88 mcg total) by mouth daily.     losartan-hydrochlorothiazide (HYZAAR) 100-25 mg per tablet Take 1 tablet by mouth daily.     melatonin 3 mg Tab tablet Take 1 tablet (3 mg total) by mouth bedtime as needed.     metoprolol succinate (TOPROL-XL) 50 MG 24 hr tablet TAKE 3 TABLETS BY MOUTH DAILY     montelukast (SINGULAIR) 10 mg  tablet Take 10 mg by mouth at bedtime.     multivitamin therapeutic (THERAGRAN) tablet Take 1 tablet by mouth daily.     NITROSTAT 0.4 mg SL tablet Place 0.4 mg under the tongue every 5 (five) minutes as needed for chest pain.      omeprazole (PRILOSEC) 20 MG capsule Take 20 mg by mouth 2 (two) times a day before meals.     peg 400-propylene glycol (SYSTANE) 0.4-0.3 % Drop Administer 1 drop to both eyes 4 (four) times a day as needed (dry eye).      potassium chloride (K-DUR,KLOR-CON) 20 MEQ tablet Take 1 tablet (20 mEq total) by mouth daily.     traMADol (ULTRAM) 50 mg tablet TAKE ONE TABLET (50 MG) BY MOUTH EVERY 8 HOURS AS NEEDED FOR PAIN     UNABLE TO FIND Take 2 capsules by mouth daily. Med Name: Omega XL ([employer] endorsed product)       PSFHx: Tobacco Status:  She  reports that she quit smoking about 44 years ago. Her smoking use included cigarettes. She has a 7.50 pack-year smoking history. She has never used smokeless tobacco.    Review of Systems:  A comprehensive review of systems is negative except for the comments above    Objective:    LMP  (LMP Unknown)   GENERAL: No acute distress.  Blood pressure 118/80.  Pulse 92.  Oxygen saturations 96%.  Markedly decreased swelling of the right leg.  Hematoma is gone.  Lungs are clear.  Heart does show atrial fibrillation.  Neurologic exam is normal.  Trace to 1+ edema of the lower legs by.  Diffuse muscle aching and tender points consistent with fibromyalgia    Assessment & Plan   Tori Do is a 88 y.o. female.    She is stable.  Check a CBC basic metabolic profile.  Discontinue Fosamax at this time.  Take iron for 3 or 4 more weeks and if her hemoglobin is stable, will discontinue the iron.  Hopefully, she will not rebleed.  Return to clinic in 4 weeks.  Check hemoglobin at that time.    Diagnoses and all orders for this visit:    Hematoma of groin, subsequent encounter  -     HM2(CBC w/o Differential)    Fibromyalgia    Essential hypertension  -      Basic Metabolic Panel    Chronic atrial fibrillation (H)        The following high BMI interventions were performed this visit: encouragement to exercise    Master Leyva MD  Transcription using voice recognition software, may contain typographical errors.

## 2021-05-30 ENCOUNTER — RECORDS - HEALTHEAST (OUTPATIENT)
Dept: ADMINISTRATIVE | Facility: CLINIC | Age: 86
End: 2021-05-30

## 2021-05-30 VITALS — HEIGHT: 58 IN | WEIGHT: 146 LBS | BODY MASS INDEX: 30.64 KG/M2

## 2021-05-30 VITALS — WEIGHT: 147 LBS | HEIGHT: 58 IN | BODY MASS INDEX: 30.86 KG/M2

## 2021-05-30 VITALS — WEIGHT: 145 LBS | BODY MASS INDEX: 30.44 KG/M2 | HEIGHT: 58 IN

## 2021-05-30 NOTE — TELEPHONE ENCOUNTER
Medication Request  Medication name:   apixaban (ELIQUIS) 5 mg Tab tablet 60 tablet 2 4/6/2019     Sig - Route: Take 1 tablet (5 mg total) by mouth 2 (two) times a day. Begin Tues April 9th if no problems with bleeding - Oral    Sent to pharmacy as: apixaban (ELIQUIS) 5 mg Tab tablet        Pharmacy Name and Location: [facility]  Reason for request: Fax request received from pharmacy.    Okay to leave a detailed message: yes

## 2021-05-30 NOTE — PROGRESS NOTES
OFFICE VISIT NOTE    Subjective:   Chief Complaint:  Follow-up    88-year-old female in for chronic atrial fibrillation, recurrent GI bleeding, hyperlipidemia.  She also had a large hematoma in the right groin after a failed watchman procedure.  Right groin hematoma is disappeared.  She still has chronic fibromyalgia with muscle pains no change in no symptoms.  Overall doing well.  No bleeding.    Current Outpatient Medications   Medication Sig     albuterol (PROAIR HFA;PROVENTIL HFA;VENTOLIN HFA) 90 mcg/actuation inhaler INHALE 2 PUFFS BY MOUTH EVERY 6 HOURS AS NEEDED FOR WHEEZING     apixaban (ELIQUIS) 5 mg Tab tablet Take 1 tablet (5 mg total) by mouth 2 (two) times a day.     atorvastatin (LIPITOR) 10 MG tablet TAKE ONE TABLET BY MOUTH ONCE DAILY     busPIRone (BUSPAR) 5 MG tablet 1 tablet 3 times daily as needed for anxiety (Patient taking differently: Take 5 mg by mouth 3 (three) times a day as needed (for anxiety).       )     cholecalciferol, vitamin D3, (CHOLECALCIFEROL) 1,000 unit tablet Take 1,000 Units by mouth daily.     DILT- mg 24 hr capsule Take 180 mg by mouth daily.     DULoxetine (CYMBALTA) 30 MG capsule TAKE ONE CAPSULE BY MOUTH TWICE A DAY     furosemide (LASIX) 80 MG tablet TAKE ONE-HALF TAB (40MG) BY MOUTH ONCE DAILY     levothyroxine (SYNTHROID, LEVOTHROID) 88 MCG tablet Take 1 tablet (88 mcg total) by mouth daily.     losartan-hydrochlorothiazide (HYZAAR) 100-25 mg per tablet Take 1 tablet by mouth daily.     melatonin 3 mg Tab tablet Take 1 tablet (3 mg total) by mouth bedtime as needed.     metoprolol succinate (TOPROL-XL) 50 MG 24 hr tablet TAKE 3 TABLETS BY MOUTH DAILY     montelukast (SINGULAIR) 10 mg tablet Take 10 mg by mouth at bedtime.     multivitamin therapeutic (THERAGRAN) tablet Take 1 tablet by mouth daily.     NITROSTAT 0.4 mg SL tablet Place 0.4 mg under the tongue every 5 (five) minutes as needed for chest pain.      omeprazole (PRILOSEC) 20 MG capsule Take 20 mg by  mouth 2 (two) times a day before meals.     peg 400-propylene glycol (SYSTANE) 0.4-0.3 % Drop Administer 1 drop to both eyes 4 (four) times a day as needed (dry eye).      potassium chloride (K-DUR,KLOR-CON) 20 MEQ tablet Take 1 tablet (20 mEq total) by mouth daily.     traMADol (ULTRAM) 50 mg tablet TAKE ONE TABLET (50 MG) BY MOUTH EVERY 8 HOURS AS NEEDED FOR PAIN     traMADol (ULTRAM) 50 mg tablet 1 tab p.o. twice daily     UNABLE TO FIND Take 2 capsules by mouth daily. Med Name: Omega XL ([employer] endorsed product)       PSFHx: Tobacco Status:  She  reports that she quit smoking about 44 years ago. Her smoking use included cigarettes. She has a 7.50 pack-year smoking history. She has never used smokeless tobacco.    Review of Systems:  A comprehensive review of systems is negative except for the comments above    Objective:    LMP  (LMP Unknown)   GENERAL: No acute distress.  Weight is stable.  Blood pressure 122/78.  Pulse 96.  Oxygen saturation 95% on room air.  Trace to 1+ edema of the lower legs.  Abdomen is nontender.  Lungs do sound clear.  Heart shows atrial fibrillation.  There is grade 1/6 systolic ejection murmur consistent with mild aortic stenosis.  Doppler right hand she is a 4 to 5 mm wart.  Slightly tender to touch.    Assessment & Plan   Tori Do is a 88 y.o. female.    Medically she is stable.  I treated the wart with two 7-second freezings using liquid nitrogen.  Checking CBC, lipid profile, basic metabolic profile.  I will see her again in 2 months.    Diagnoses and all orders for this visit:    Iron deficiency anemia due to chronic blood loss  -     HM2(CBC w/o Differential)    Hyponatremia  -     Basic Metabolic Panel    Essential hypertension    Chronic atrial fibrillation (H)    Dyslipidemia  -     HM2(CBC w/o Differential)  -     Lipid Cascade            Master Leyva MD  Transcription using voice recognition software, may contain typographical errors.

## 2021-05-31 ENCOUNTER — RECORDS - HEALTHEAST (OUTPATIENT)
Dept: ADMINISTRATIVE | Facility: CLINIC | Age: 86
End: 2021-05-31

## 2021-05-31 VITALS — HEIGHT: 58 IN | WEIGHT: 141 LBS | BODY MASS INDEX: 29.6 KG/M2

## 2021-05-31 VITALS — BODY MASS INDEX: 30.02 KG/M2 | WEIGHT: 143 LBS | HEIGHT: 58 IN

## 2021-05-31 VITALS — HEIGHT: 58 IN | WEIGHT: 147 LBS | BODY MASS INDEX: 30.86 KG/M2

## 2021-05-31 VITALS — WEIGHT: 147 LBS | HEIGHT: 58 IN | BODY MASS INDEX: 30.86 KG/M2

## 2021-05-31 NOTE — PROGRESS NOTES
OFFICE VISIT NOTE    Subjective:   Chief Complaint:  No chief complaint on file.    88-year-old woman with multiple problems including chronic atrial fibrillation, chronic pain from fibromyalgia, essential hypertension.  Today with increasing pain in the left knee.  She has known osteoarthritis.  No recent injury.  The knee started to throb and ache more.    Current Outpatient Medications   Medication Sig     albuterol (PROAIR HFA;PROVENTIL HFA;VENTOLIN HFA) 90 mcg/actuation inhaler INHALE 2 PUFFS BY MOUTH EVERY 6 HOURS AS NEEDED FOR WHEEZING     apixaban (ELIQUIS) 5 mg Tab tablet Take 1 tablet (5 mg total) by mouth 2 (two) times a day.     atorvastatin (LIPITOR) 10 MG tablet TAKE ONE TABLET BY MOUTH ONCE DAILY     busPIRone (BUSPAR) 5 MG tablet 1 tablet 3 times daily as needed for anxiety (Patient taking differently: Take 5 mg by mouth 3 (three) times a day as needed (for anxiety).       )     cholecalciferol, vitamin D3, (CHOLECALCIFEROL) 1,000 unit tablet Take 1,000 Units by mouth daily.     DILT- mg 24 hr capsule Take 180 mg by mouth daily.     DULoxetine (CYMBALTA) 30 MG capsule TAKE ONE CAPSULE BY MOUTH TWICE A DAY     furosemide (LASIX) 80 MG tablet TAKE ONE-HALF TAB (40MG) BY MOUTH ONCE DAILY     levothyroxine (SYNTHROID, LEVOTHROID) 88 MCG tablet Take 1 tablet (88 mcg total) by mouth daily.     losartan-hydrochlorothiazide (HYZAAR) 100-25 mg per tablet Take 1 tablet by mouth daily.     melatonin 3 mg Tab tablet Take 1 tablet (3 mg total) by mouth bedtime as needed.     metoprolol succinate (TOPROL-XL) 50 MG 24 hr tablet TAKE 3 TABLETS BY MOUTH DAILY     montelukast (SINGULAIR) 10 mg tablet Take 10 mg by mouth at bedtime.     multivitamin therapeutic (THERAGRAN) tablet Take 1 tablet by mouth daily.     NITROSTAT 0.4 mg SL tablet Place 0.4 mg under the tongue every 5 (five) minutes as needed for chest pain.      omeprazole (PRILOSEC) 20 MG capsule Take 20 mg by mouth 2 (two) times a day before meals.      peg 400-propylene glycol (SYSTANE) 0.4-0.3 % Drop Administer 1 drop to both eyes 4 (four) times a day as needed (dry eye).      potassium chloride (K-DUR,KLOR-CON) 20 MEQ tablet Take 1 tablet (20 mEq total) by mouth daily.     predniSONE (DELTASONE) 5 MG tablet 1 tab twice daily for 4 days then 1 tab daily for 4 days and stop.     traMADol (ULTRAM) 50 mg tablet TAKE ONE TABLET (50 MG) BY MOUTH EVERY 8 HOURS AS NEEDED FOR PAIN     traMADol (ULTRAM) 50 mg tablet 1 tab p.o. twice daily     UNABLE TO FIND Take 2 capsules by mouth daily. Med Name: Omega XL ([employer] endorsed product)       Review of Systems:  A comprehensive review of systems is negative except for the comments above    Objective:    LMP  (LMP Unknown)   GENERAL: No acute distress.  Blood pressure is 130/78.  Pulse 120.  Oxygen saturations 95%.  1-2+ edema of the legs.  Significant osteoarthritis of both knees.  The left knee hurts the worse today.  Lungs seem clear.  Heart shows atrial fibrillation with rapid ventricular rate.    Assessment & Plan   Tori Do is a 88 y.o. female.    Knee pain related osteoarthritis.  She is on blood thinners some good avoid doing a cortisone shot today.  Rather, put her on prednisone 10 mg daily for 4 days then 5 mg daily for 4 days and stop.  If the symptoms should persist after that, I will have her hold her blood thinner for 1 day; coming to the office and then do an injection with cortisone.  Because of her rapid ventricular rate of increasing metoprolol to 75 mg daily from the current dosage of 50 mg daily.  I think that will improve her dyspnea on exertion.  Diagnoses and all orders for this visit:    Chronic atrial fibrillation (H)    Essential hypertension    Primary osteoarthritis involving multiple joints  -     predniSONE (DELTASONE) 5 MG tablet; 1 tab twice daily for 4 days then 1 tab daily for 4 days and stop.  Dispense: 12 tablet; Refill: 0        Master Leyva MD  Transcription using voice  recognition software, may contain typographical errors.

## 2021-05-31 NOTE — TELEPHONE ENCOUNTER
Controlled Substance Refill Request  Medication Name:   Requested Prescriptions     Pending Prescriptions Disp Refills     traMADol (ULTRAM) 50 mg tablet 60 tablet 0     Si tab p.o. twice daily     Date Last Fill: 6/10/2019  Pharmacy:[facility]      Submit electronically to pharmacy  Controlled Substance Agreement Date Scanned:   Encounter-Level CSA Scan Date:    There are no encounter-level csa scan date.       Last office visit with prescriber/PCP: 7/15/2019 Master Leyva MD OR same dept: 7/15/2019 Master Leyva MD OR same specialty: 7/15/2019 Master Leyva MD  Last physical: Visit date not found Last MTM visit: Visit date not found

## 2021-05-31 NOTE — PROGRESS NOTES
"  Office Visit - Follow Up   Tori Do   88 y.o. female    Date of Visit: 8/20/2019    Chief Complaint   Patient presents with     Knee Pain     Left knee pain, stayed off blood thinner for injection     Ear Fullness     Right ear feels plugged x week         Assessment and Plan   1. Osteoarthritis  She has been complaining of knee pain for the last several weeks.  She was told by Dr. Leyva that she have to stop her Eliquis for a day prior to getting this injected.  Last dose of Eliquis was 8/17 in the evening.  She took no Eliquis yesterday.  I injected her left knee with triamcinolone and 2 mL's of 1% lidocaine tolerated this very well and had an excellent result with improvement in her knee pain  - triamcinolone acetonide 40 mg/mL injection 40 mg (KENALOG-40)          No follow-ups on file.     History of Present Illness   This 88 y.o. old has chronic arthritis in her knees.  Her right knee is been doing quite well in recent months but now complains that her left knee is hurting a great deal.  She discussed this with Dr. Leyva suggested she hold her Eliquis prior to any procedure.  Getting this left knee injected after holding her Eliquis as described above    Review of Systems: A comprehensive review of systems was negative except as noted.     Medications, Allergies and Problem List   Reviewed, reconciled and updated  Post Discharge Medication Reconciliation Status:      Physical Exam   General Appearance:       /70 (Patient Site: Right Arm, Patient Position: Sitting, Cuff Size: Adult Large)   Pulse 72   Ht 4' 11\" (1.499 m)   Wt 140 lb (63.5 kg)   LMP  (LMP Unknown)   SpO2 95%   BMI 28.28 kg/m      She has fairly good flexion of her right knee and less flexion in her left knee with some pain on flexion.  Used to be a bit of fluid in her left compared to right side.  Both knee joints are stable.    Procedure: After verbal consent and prepping with just alcohol and using a no touch technique " I injected 40 mg of Kenalog plus lidocaine.  She tolerated this well.  22-gauge needle seem to easily get into the joint space.     Additional Information   Current Outpatient Medications   Medication Sig Dispense Refill     albuterol (PROAIR HFA;PROVENTIL HFA;VENTOLIN HFA) 90 mcg/actuation inhaler INHALE 2 PUFFS BY MOUTH EVERY 6 HOURS AS NEEDED FOR WHEEZING 3 Inhaler 1     apixaban (ELIQUIS) 5 mg Tab tablet Take 1 tablet (5 mg total) by mouth 2 (two) times a day. 60 tablet 2     atorvastatin (LIPITOR) 10 MG tablet TAKE ONE TABLET BY MOUTH ONCE DAILY 90 tablet 3     busPIRone (BUSPAR) 5 MG tablet 1 tablet 3 times daily as needed for anxiety (Patient taking differently: Take 5 mg by mouth 3 (three) times a day as needed (for anxiety).       ) 60 tablet 0     cholecalciferol, vitamin D3, (CHOLECALCIFEROL) 1,000 unit tablet Take 1,000 Units by mouth daily.       DILT- mg 24 hr capsule Take 180 mg by mouth daily.       DULoxetine (CYMBALTA) 30 MG capsule TAKE ONE CAPSULE BY MOUTH TWICE A  capsule 3     furosemide (LASIX) 80 MG tablet TAKE ONE-HALF TAB (40MG) BY MOUTH ONCE DAILY 45 tablet 3     levothyroxine (SYNTHROID, LEVOTHROID) 88 MCG tablet Take 1 tablet (88 mcg total) by mouth daily. 90 tablet 3     losartan-hydrochlorothiazide (HYZAAR) 100-25 mg per tablet Take 1 tablet by mouth daily.       melatonin 3 mg Tab tablet Take 1 tablet (3 mg total) by mouth bedtime as needed.  0     metoprolol succinate (TOPROL-XL) 50 MG 24 hr tablet TAKE 3 TABLETS BY MOUTH DAILY 270 tablet 3     montelukast (SINGULAIR) 10 mg tablet Take 10 mg by mouth at bedtime.       multivitamin therapeutic (THERAGRAN) tablet Take 1 tablet by mouth daily.       NITROSTAT 0.4 mg SL tablet Place 0.4 mg under the tongue every 5 (five) minutes as needed for chest pain.        omeprazole (PRILOSEC) 20 MG capsule Take 20 mg by mouth 2 (two) times a day before meals.       peg 400-propylene glycol (SYSTANE) 0.4-0.3 % Drop Administer 1 drop to  both eyes 4 (four) times a day as needed (dry eye).        potassium chloride (K-DUR,KLOR-CON) 20 MEQ tablet Take 1 tablet (20 mEq total) by mouth daily. 90 tablet 3     traMADol (ULTRAM) 50 mg tablet TAKE ONE TABLET (50 MG) BY MOUTH EVERY 8 HOURS AS NEEDED FOR PAIN 60 tablet 0     traMADol (ULTRAM) 50 mg tablet 1 tab p.o. twice daily 60 tablet 0     UNABLE TO FIND Take 2 capsules by mouth daily. Med Name: Omega XL ([employer] endorsed product)       No current facility-administered medications for this visit.      No Known Allergies  Social History     Tobacco Use     Smoking status: Former Smoker     Packs/day: 0.50     Years: 15.00     Pack years: 7.50     Types: Cigarettes     Last attempt to quit: 1975     Years since quittin.6     Smokeless tobacco: Never Used   Substance Use Topics     Alcohol use: No     Comment: denies     Drug use: No       Review and/or order of clinical lab tests:  Review and/or order of radiology tests:  Review and/or order of medicine tests:  Discussion of test results with performing physician:  Decision to obtain old records and/or obtain history from someone other than the patient:  Review and summarization of old records and/or obtaining history from someone other than the patient and.or discussion of case with another health care provider:  Independent visualization of image, tracing or specimen itself:    Time: total time spent with the patient was 15 minutes of which >50% was spent in counseling and coordination of care     Donato Pelaez MD

## 2021-06-01 VITALS — BODY MASS INDEX: 30.64 KG/M2 | WEIGHT: 146 LBS | HEIGHT: 58 IN

## 2021-06-01 VITALS — BODY MASS INDEX: 30.02 KG/M2 | HEIGHT: 58 IN | WEIGHT: 143 LBS

## 2021-06-01 NOTE — TELEPHONE ENCOUNTER
Prescription Monitoring Program activity reviewed with no discrepancies noted.      Last fill per : 08/05/19  Quantity/days supply: #60 for a 30 day supply    Controlled Substance Agreement on file: No  Date:     Last office visit with provider:  8/12/2019 Master Leyva MD    Please advise.

## 2021-06-01 NOTE — TELEPHONE ENCOUNTER
Medication Request  Medication name: omeprazole 20 mg capsule - take 1 cap by mouth twice daily  Pharmacy Name and Location: [address]  Reason for request: Refill. This Rx is historical on file.  When did you use medication last?:  Per fax, last filled on 8/5/19  Patient offered appointment:  n/a  Okay to leave a detailed message: no

## 2021-06-01 NOTE — TELEPHONE ENCOUNTER
Controlled Substance Refill Request  Medication Name:   Requested Prescriptions     Pending Prescriptions Disp Refills     traMADol (ULTRAM) 50 mg tablet 60 tablet 0     Si tab p.o. twice daily     Date Last Fill: 2019  Pharmacy: [address]      Submit electronically to pharmacy  Controlled Substance Agreement Date Scanned:   Encounter-Level CSA Scan Date:    There are no encounter-level csa scan date.       Last office visit with prescriber/PCP: 2019 Master Leyva MD OR same dept: 2019 Master Leyva MD OR same specialty: 2019 Master Leyva MD  Last physical: Visit date not found Last MTM visit: Visit date not found

## 2021-06-01 NOTE — PROGRESS NOTES
OFFICE VISIT NOTE    Subjective:   Chief Complaint:  Follow-up    For follow-up regarding chronic atrial fibrillation, hypertension, GI bleed from gastric ulcer.  She is also having difficulty with painful knees.  She did receive a cortisone injection a few weeks ago but it did not help.  She continues to have pain in the left knee.  Second problem is decreased hearing in the right ear.  She wonders if she is plugged with wax.  No recent GI bleeding or melena.    Current Outpatient Medications   Medication Sig     albuterol (PROAIR HFA;PROVENTIL HFA;VENTOLIN HFA) 90 mcg/actuation inhaler INHALE 2 PUFFS BY MOUTH EVERY 6 HOURS AS NEEDED FOR WHEEZING     apixaban (ELIQUIS) 5 mg Tab tablet Take 1 tablet (5 mg total) by mouth 2 (two) times a day.     atorvastatin (LIPITOR) 10 MG tablet TAKE ONE TABLET BY MOUTH ONCE DAILY     busPIRone (BUSPAR) 5 MG tablet 1 tablet 3 times daily as needed for anxiety (Patient taking differently: Take 5 mg by mouth 3 (three) times a day as needed (for anxiety).       )     cholecalciferol, vitamin D3, (CHOLECALCIFEROL) 1,000 unit tablet Take 1,000 Units by mouth daily.     DILT- mg 24 hr capsule Take 180 mg by mouth daily.     DULoxetine (CYMBALTA) 30 MG capsule TAKE ONE CAPSULE BY MOUTH TWICE A DAY     furosemide (LASIX) 80 MG tablet TAKE ONE-HALF TAB (40MG) BY MOUTH ONCE DAILY     levothyroxine (SYNTHROID, LEVOTHROID) 88 MCG tablet Take 1 tablet (88 mcg total) by mouth daily.     losartan-hydrochlorothiazide (HYZAAR) 100-25 mg per tablet Take 1 tablet by mouth daily.     melatonin 3 mg Tab tablet Take 1 tablet (3 mg total) by mouth bedtime as needed.     metoprolol succinate (TOPROL-XL) 50 MG 24 hr tablet TAKE 3 TABLETS BY MOUTH DAILY     montelukast (SINGULAIR) 10 mg tablet Take 10 mg by mouth at bedtime.     multivitamin therapeutic (THERAGRAN) tablet Take 1 tablet by mouth daily.     NITROSTAT 0.4 mg SL tablet Place 0.4 mg under the tongue every 5 (five) minutes as needed for  chest pain.      omeprazole (PRILOSEC) 20 MG capsule Take 1 capsule (20 mg total) by mouth 2 (two) times a day before meals.     peg 400-propylene glycol (SYSTANE) 0.4-0.3 % Drop Administer 1 drop to both eyes 4 (four) times a day as needed (dry eye).      potassium chloride (K-DUR,KLOR-CON) 20 MEQ tablet Take 1 tablet (20 mEq total) by mouth daily.     traMADol (ULTRAM) 50 mg tablet 1 tab p.o. twice daily     UNABLE TO FIND Take 2 capsules by mouth daily. Med Name: Omega XL ([employer] endorsed product)       Review of Systems:  A comprehensive review of systems is negative except for the comments above    Objective:    LMP  (LMP Unknown)   GENERAL: No acute distress.  Weight is stable.  Blood pressure 106/66.  Pulse 85-90 and atrial fibrillation is the rhythm.  Trace edema of the legs.  Lungs are clear.  Heart shows controlled atrial fibrillation.  Tender over the anserine bursa of the left knee.  Fairly good flexion and extension.  Right ear is totally occluded with hard wax.    Assessment & Plan   Tori Do is a 88 y.o. female.    Some of her knee pain is bursitis.  I did cleanse the skin with alcohol and Betadine.  I then injected 40 mg of triamcinolone into the anserine bursa.  I used 1 cc of Xylocaine with it.  The skin had been cleansed with alcohol and Betadine.  A 27-gauge needle was used for the injection.  There were no complications.  Right ear was cleaned using a cerumen spoon.  All wax was removed.  Atrial fibrillation at this time is controlled.  We will continue her blood thinner as well as her current dosage of metoprolol.  Blood pressure is stable.  No evidence of active bleeding.  This is stable.    Diagnoses and all orders for this visit:    Chronic atrial fibrillation (H)    Essential hypertension    Gastrointestinal hemorrhage associated with gastric ulcer    Anserine bursitis    Impacted cerumen of right ear        Master Leyva MD  Transcription using voice recognition software, may  contain typographical errors.

## 2021-06-01 NOTE — TELEPHONE ENCOUNTER
Controlled Substance Refill Request  Medication Name:   Requested Prescriptions     Pending Prescriptions Disp Refills     traMADol (ULTRAM) 50 mg tablet 60 tablet 0     Si tab p.o. twice daily     Date Last Fill: 2019  Pharmacy: [address] [facility]      Submit electronically to pharmacy  Controlled Substance Agreement Date Scanned:   Encounter-Level CSA Scan Date:    There are no encounter-level csa scan date.       Last office visit with prescriber/PCP: 2019 Master Leyva MD OR same dept: 2019 Donato Pelaez MD OR same specialty: 2019 Donato Pelaez MD  Last physical: Visit date not found Last MTM visit: Visit date not found

## 2021-06-02 ENCOUNTER — RECORDS - HEALTHEAST (OUTPATIENT)
Dept: ADMINISTRATIVE | Facility: CLINIC | Age: 86
End: 2021-06-02

## 2021-06-02 VITALS — BODY MASS INDEX: 30.23 KG/M2 | WEIGHT: 144 LBS | HEIGHT: 58 IN

## 2021-06-02 VITALS — WEIGHT: 146 LBS | BODY MASS INDEX: 31.5 KG/M2 | HEIGHT: 57 IN

## 2021-06-02 VITALS — HEIGHT: 57 IN | WEIGHT: 150.7 LBS | BODY MASS INDEX: 32.51 KG/M2

## 2021-06-02 VITALS — BODY MASS INDEX: 30.02 KG/M2 | HEIGHT: 58 IN | WEIGHT: 143 LBS

## 2021-06-02 VITALS — WEIGHT: 143 LBS | BODY MASS INDEX: 30.02 KG/M2 | HEIGHT: 58 IN

## 2021-06-02 VITALS — WEIGHT: 143 LBS | HEIGHT: 57 IN | BODY MASS INDEX: 30.85 KG/M2

## 2021-06-02 VITALS — BODY MASS INDEX: 30.44 KG/M2 | HEIGHT: 58 IN | WEIGHT: 145 LBS

## 2021-06-02 VITALS — BODY MASS INDEX: 30.42 KG/M2 | WEIGHT: 141 LBS | HEIGHT: 57 IN

## 2021-06-02 VITALS — WEIGHT: 151 LBS | HEIGHT: 57 IN | BODY MASS INDEX: 32.58 KG/M2

## 2021-06-02 VITALS — BODY MASS INDEX: 30.02 KG/M2 | WEIGHT: 143 LBS | HEIGHT: 58 IN

## 2021-06-02 VITALS — WEIGHT: 144.12 LBS | HEIGHT: 58 IN | BODY MASS INDEX: 30.25 KG/M2

## 2021-06-02 VITALS — BODY MASS INDEX: 31.71 KG/M2 | WEIGHT: 147 LBS | HEIGHT: 57 IN

## 2021-06-02 VITALS — HEIGHT: 58 IN | WEIGHT: 143.2 LBS | BODY MASS INDEX: 30.06 KG/M2

## 2021-06-02 NOTE — TELEPHONE ENCOUNTER
Refill Approved    Rx renewed per Medication Renewal Policy. Medication was last renewed on 10/177913.    Michelle Escalera, [facility] Triage/Med Refill 10/29/2019     Requested Prescriptions   Pending Prescriptions Disp Refills     potassium chloride (K-DUR,KLOR-CON) 20 MEQ tablet [Pharmacy Med Name: POT CL MICRO TAB 20MEQ ER TABLET] 90 tablet      Sig: TAKE ONE TABLET BY MOUTH ONCE DAILY       Potassium Supplements Refill Protocol Passed - 10/29/2019  2:58 PM        Passed - PCP or prescribing provider visit in past 12 months       Last office visit with prescriber/PCP: 2/4/2019 Ran Yin MD OR same dept: 9/16/2019 Master Leyva MD OR same specialty: 9/16/2019 Master Leyva MD  Last physical: 3/26/2019 Last MTM visit: Visit date not found   Next visit within 3 mo: Visit date not found  Next physical within 3 mo: Visit date not found  Prescriber OR PCP: Ran Yin MD  Last diagnosis associated with med order: There are no diagnoses linked to this encounter.  If protocol passes may refill for 12 months if within 3 months of last provider visit (or a total of 15 months).             Passed - Potassium level in last 12 months     Lab Results   Component Value Date    Potassium 3.6 07/15/2019

## 2021-06-02 NOTE — TELEPHONE ENCOUNTER
Controlled Substance Refill Request  Medication:   Requested Prescriptions     Pending Prescriptions Disp Refills     traMADol (ULTRAM) 50 mg tablet 60 tablet 0     Si tab p.o. twice daily     Date Last Fill: 10/1/19  Ph[facility]acy: [facility]   Submit electronically to pharmacy  Controlled Substance Agreement on File:   Encounter-Level CSA Scan Date:    There are no encounter-level csa scan date.       Last office visit: Last office visit pertaining to requested medication was 19.

## 2021-06-02 NOTE — TELEPHONE ENCOUNTER
RN cannot approve Refill Request    RN can NOT refill this medication med is not covered by policy/route to provider. Last office visit: Visit date not found Last Physical: Visit date not found Last MTM visit: Visit date not found Last visit same specialty: 9/16/2019 Master Leyva MD.  Next visit within 3 mo: Visit date not found  Next physical within 3 mo: Visit date not found      Saskia Cast, [facility] Triage/Med Refill 10/29/2019    Requested Prescriptions   Pending Prescriptions Disp Refills     ELIQUIS 5 mg Tab tablet [Pharmacy Med Name: ELIQUIS 5MG TAB TABLET] 60 tablet 2     Sig: TAKE 1 TABLET (5 MG TOTAL) BY MOUTH 2 (TWO) TIMES A DAY.       Apixaban/Rivaroxaban/Dabigatran Refill Protocol Failed - 10/29/2019  2:52 PM        Failed - Route to appropriate pool/provider     Last Anticoagulation Summary:           Passed - Renal function test in last year     Creatinine   Date Value Ref Range Status   07/15/2019 0.99 0.60 - 1.10 mg/dL Final             Passed - PCP or prescribing provider visit in past 12 months       Last office visit with prescriber/PCP: Visit date not found OR same dept: 9/16/2019 Master Leyva MD OR same specialty: 9/16/2019 Master Leyva MD  Last physical: Visit date not found Last MTM visit: Visit date not found   Next visit within 3 mo: Visit date not found  Next physical within 3 mo: Visit date not found  Prescriber OR PCP: Marycruz Warner MD  Last diagnosis associated with med order: 1. Atrial fibrillation -- S/P cardioversion 2015  - ELIQUIS 5 mg Tab tablet [Pharmacy Med Name: ELIQUIS 5MG TAB TABLET]; TAKE 1 TABLET (5 MG TOTAL) BY MOUTH 2 (TWO) TIMES A DAY.  Dispense: 60 tablet; Refill: 2    If protocol passes may refill for 12 months if within 3 months of last provider visit (or a total of 15 months).

## 2021-06-03 VITALS — HEIGHT: 59 IN | BODY MASS INDEX: 28.22 KG/M2 | WEIGHT: 140 LBS

## 2021-06-03 VITALS — BODY MASS INDEX: 28.22 KG/M2 | HEIGHT: 59 IN | WEIGHT: 140 LBS

## 2021-06-03 VITALS — BODY MASS INDEX: 30.85 KG/M2 | HEIGHT: 57 IN | WEIGHT: 143 LBS

## 2021-06-03 VITALS
SYSTOLIC BLOOD PRESSURE: 106 MMHG | WEIGHT: 136 LBS | BODY MASS INDEX: 27.42 KG/M2 | HEIGHT: 59 IN | DIASTOLIC BLOOD PRESSURE: 66 MMHG | HEART RATE: 84 BPM

## 2021-06-03 VITALS — WEIGHT: 145.1 LBS | HEIGHT: 59 IN | BODY MASS INDEX: 29.25 KG/M2

## 2021-06-03 VITALS — BODY MASS INDEX: 28.22 KG/M2 | WEIGHT: 140 LBS | HEIGHT: 59 IN

## 2021-06-03 VITALS — HEIGHT: 59 IN | BODY MASS INDEX: 28.43 KG/M2 | WEIGHT: 141 LBS

## 2021-06-03 NOTE — PROGRESS NOTES
OFFICE VISIT NOTE    Subjective:   Chief Complaint:  Follow-up (cortisone injection)    88-year-old woman in for follow-up regarding osteoarthritis.  She has a very painful left knee.  She would like a cortisone shot.  She is been holding her blood thinner Eliquis the last 48 hours.    Current Outpatient Medications   Medication Sig     albuterol (PROAIR HFA;PROVENTIL HFA;VENTOLIN HFA) 90 mcg/actuation inhaler INHALE 2 PUFFS BY MOUTH EVERY 6 HOURS AS NEEDED FOR WHEEZING     atorvastatin (LIPITOR) 10 MG tablet TAKE ONE TABLET BY MOUTH ONCE DAILY     busPIRone (BUSPAR) 5 MG tablet 1 tablet 3 times daily as needed for anxiety (Patient taking differently: Take 5 mg by mouth 3 (three) times a day as needed (for anxiety).       )     cholecalciferol, vitamin D3, (CHOLECALCIFEROL) 1,000 unit tablet Take 1,000 Units by mouth daily.     DILT- mg 24 hr capsule Take 180 mg by mouth daily.     DULoxetine (CYMBALTA) 30 MG capsule TAKE ONE CAPSULE BY MOUTH TWICE A DAY     ELIQUIS 5 mg Tab tablet TAKE 1 TABLET (5 MG TOTAL) BY MOUTH 2 (TWO) TIMES A DAY.     furosemide (LASIX) 80 MG tablet TAKE ONE-HALF TAB (40MG) BY MOUTH ONCE DAILY     levothyroxine (SYNTHROID, LEVOTHROID) 88 MCG tablet Take 1 tablet (88 mcg total) by mouth daily.     losartan-hydrochlorothiazide (HYZAAR) 100-25 mg per tablet Take 1 tablet by mouth daily.     melatonin 3 mg Tab tablet Take 1 tablet (3 mg total) by mouth bedtime as needed.     metoprolol succinate (TOPROL-XL) 50 MG 24 hr tablet TAKE 3 TABLETS BY MOUTH DAILY     montelukast (SINGULAIR) 10 mg tablet Take 10 mg by mouth at bedtime.     multivitamin therapeutic (THERAGRAN) tablet Take 1 tablet by mouth daily.     NITROSTAT 0.4 mg SL tablet Place 0.4 mg under the tongue every 5 (five) minutes as needed for chest pain.      omeprazole (PRILOSEC) 20 MG capsule Take 1 capsule (20 mg total) by mouth 2 (two) times a day before meals.     peg 400-propylene glycol (SYSTANE) 0.4-0.3 % Drop Administer 1  "drop to both eyes 4 (four) times a day as needed (dry eye).      potassium chloride (K-DUR,KLOR-CON) 20 MEQ tablet Take 1 tablet (20 mEq total) by mouth daily.     potassium chloride (K-DUR,KLOR-CON) 20 MEQ tablet TAKE ONE TABLET BY MOUTH ONCE DAILY     traMADol (ULTRAM) 50 mg tablet 1 tab p.o. twice daily     UNABLE TO FIND Take 2 capsules by mouth daily. Med Name: Omega XL ([employer] endorsed product)       Review of Systems:  A comprehensive review of systems is negative except for the comments above    Objective:    Ht 4' 11\" (1.499 m)   Wt 135 lb (61.2 kg)   LMP  (LMP Unknown)   BMI 27.27 kg/m    GENERAL: No acute distress.  Significant osteoarthritis.  Small knee effusion noted on the left side.  Circulation seems intact.    Assessment & Plan   Tori Do is a 88 y.o. female.    Imp traumatic osteoarthritis involving the left knee.  I went ahead with a cortisone injection.  I cleansed the skin with Betadine and alcohol.  I then injected 2 cc of 1% Xylocaine and 40 mg of Kenalog into the left knee.  I used a medial approach.  A 27-gauge needle was used and there were no complications.  A bandage was applied.  We will see how she does with this shot.    Diagnoses and all orders for this visit:    Primary osteoarthritis involving multiple joints        Master Leyva MD  Transcription using voice recognition software, may contain typographical errors.    "

## 2021-06-03 NOTE — TELEPHONE ENCOUNTER
Question following Office Visit  When did you see your provider: 11/18/19  What is your question: Patient would like to get cortisone injection on Monday afternoon patient know that she need to hold her blood thinners for 36 hrs to get cortisone injection . Dose she need appointment to get injection . Please call patient with information.  Okay to leave a detailed message: No

## 2021-06-03 NOTE — PROGRESS NOTES
OFFICE VISIT NOTE    Subjective:   Chief Complaint:  Follow-up    88-year-old woman in for multiple problems including chronic atrial fibrillation, fibromyalgia, GI bleeding possibly from gastric ulcer disease.  Also has a history of hypothyroidism.  She is generally doing okay.  Aches and pains from her arthritis and fibromyalgia are a chronic problem for her.  No real changes.  No increasing dyspnea.  No orthopnea.    Current Outpatient Medications   Medication Sig     albuterol (PROAIR HFA;PROVENTIL HFA;VENTOLIN HFA) 90 mcg/actuation inhaler INHALE 2 PUFFS BY MOUTH EVERY 6 HOURS AS NEEDED FOR WHEEZING     atorvastatin (LIPITOR) 10 MG tablet TAKE ONE TABLET BY MOUTH ONCE DAILY     busPIRone (BUSPAR) 5 MG tablet 1 tablet 3 times daily as needed for anxiety (Patient taking differently: Take 5 mg by mouth 3 (three) times a day as needed (for anxiety).       )     cholecalciferol, vitamin D3, (CHOLECALCIFEROL) 1,000 unit tablet Take 1,000 Units by mouth daily.     DILT- mg 24 hr capsule Take 180 mg by mouth daily.     DULoxetine (CYMBALTA) 30 MG capsule TAKE ONE CAPSULE BY MOUTH TWICE A DAY     ELIQUIS 5 mg Tab tablet TAKE 1 TABLET (5 MG TOTAL) BY MOUTH 2 (TWO) TIMES A DAY.     furosemide (LASIX) 80 MG tablet TAKE ONE-HALF TAB (40MG) BY MOUTH ONCE DAILY     levothyroxine (SYNTHROID, LEVOTHROID) 88 MCG tablet Take 1 tablet (88 mcg total) by mouth daily.     losartan-hydrochlorothiazide (HYZAAR) 100-25 mg per tablet Take 1 tablet by mouth daily.     melatonin 3 mg Tab tablet Take 1 tablet (3 mg total) by mouth bedtime as needed.     metoprolol succinate (TOPROL-XL) 50 MG 24 hr tablet TAKE 3 TABLETS BY MOUTH DAILY     montelukast (SINGULAIR) 10 mg tablet Take 10 mg by mouth at bedtime.     multivitamin therapeutic (THERAGRAN) tablet Take 1 tablet by mouth daily.     NITROSTAT 0.4 mg SL tablet Place 0.4 mg under the tongue every 5 (five) minutes as needed for chest pain.      omeprazole (PRILOSEC) 20 MG capsule Take  1 capsule (20 mg total) by mouth 2 (two) times a day before meals.     peg 400-propylene glycol (SYSTANE) 0.4-0.3 % Drop Administer 1 drop to both eyes 4 (four) times a day as needed (dry eye).      potassium chloride (K-DUR,KLOR-CON) 20 MEQ tablet Take 1 tablet (20 mEq total) by mouth daily.     potassium chloride (K-DUR,KLOR-CON) 20 MEQ tablet TAKE ONE TABLET BY MOUTH ONCE DAILY     traMADol (ULTRAM) 50 mg tablet 1 tab p.o. twice daily     UNABLE TO FIND Take 2 capsules by mouth daily. Med Name: Omega XL ([employer] endorsed product)       PSFHx: Tobacco Status:  She  reports that she quit smoking about 44 years ago. Her smoking use included cigarettes. She has a 7.50 pack-year smoking history. She has never used smokeless tobacco.    Review of Systems:  A comprehensive review of systems is negative except for the comments above    Objective:    LMP  (LMP Unknown)   GENERAL: No acute distress.  Weight is stable.  Today's blood pressure 116/80.  Pulse is 80    Oxygen saturation is 96  Trace edema of the feet.  Lungs seem clear.  Heart shows an irregular rhythm.  Consistent with atrial fibrillation.  Heart rate 80-90.  No JVD.  No active synovitis of her joints.  Neurologically seems normal.  Painful left knee.    Assessment & Plan   Tori Do is a 88 y.o. female.    Medically she seems stable.  We will check her hemoglobin to see if there is any signs of worsening loss and anemia.  She will need a cortisone injection but I am going to have her hold Eliquis in the future before doing an acute she understands.  Continue metoprolol for blood pressure and heart rate.  She does not need losartan for blood pressure at this time.  Atrial fibrillation rate is now controlled.  Continue the metoprolol.  Pneumovax vaccination today.  Return to clinic in 2 months.    Diagnoses and all orders for this visit:    Iron deficiency anemia due to chronic blood loss    Primary osteoarthritis involving multiple joints    Essential  hypertension    Chronic atrial fibrillation    Need for vaccination        The following high BMI interventions were performed this visit: encouragement to exercise    Master Leyva MD  Transcription using voice recognition software, may contain typographical errors.

## 2021-06-03 NOTE — TELEPHONE ENCOUNTER
Spoke with the patient to get clarification on the message below.  Patient states that Dr. Leyva told her last week that if she needed a cortisone injection to hold her blood thinner for 36 hours.  She is scheduled to see Dr. Leyva on Monday for a cortisone injection.  Patient had no further questions at this time and nothing was needed from the clinic.  Libra RICHARDS CMA/CORTEZ....................9:49 AM

## 2021-06-03 NOTE — TELEPHONE ENCOUNTER
Controlled Substance Refill Request  Medication:   Requested Prescriptions     Pending Prescriptions Disp Refills     traMADol (ULTRAM) 50 mg tablet 60 tablet 0     Si tab p.o. twice daily     Date Last Fill: 10/30/19  Ph[facility]: [facility]   Submit electronically to pharmacy  Controlled Substance Agreement on File:   Encounter-Level CSA Scan Date:    There are no encounter-level csa scan date.       Last office visit: Last office visit pertaining to requested medication was 19.

## 2021-06-04 VITALS
HEART RATE: 76 BPM | SYSTOLIC BLOOD PRESSURE: 118 MMHG | WEIGHT: 135 LBS | HEIGHT: 59 IN | DIASTOLIC BLOOD PRESSURE: 78 MMHG | BODY MASS INDEX: 27.21 KG/M2

## 2021-06-04 VITALS
BODY MASS INDEX: 27.42 KG/M2 | HEIGHT: 59 IN | WEIGHT: 136 LBS | HEART RATE: 79 BPM | OXYGEN SATURATION: 96 % | SYSTOLIC BLOOD PRESSURE: 116 MMHG | DIASTOLIC BLOOD PRESSURE: 80 MMHG

## 2021-06-04 VITALS
WEIGHT: 136 LBS | SYSTOLIC BLOOD PRESSURE: 138 MMHG | BODY MASS INDEX: 29.34 KG/M2 | HEART RATE: 95 BPM | HEIGHT: 57 IN | DIASTOLIC BLOOD PRESSURE: 78 MMHG

## 2021-06-04 VITALS
SYSTOLIC BLOOD PRESSURE: 138 MMHG | BODY MASS INDEX: 29.34 KG/M2 | HEIGHT: 57 IN | WEIGHT: 136 LBS | DIASTOLIC BLOOD PRESSURE: 84 MMHG

## 2021-06-04 NOTE — TELEPHONE ENCOUNTER
Controlled Substance Refill Request  Medication:   Requested Prescriptions     Pending Prescriptions Disp Refills     predniSONE (DELTASONE) 5 MG tablet [Pharmacy Med Name: PREDNISONE 5MG TAB TABLET] 12 tablet 0     Sig: TAKE 1 TABLET BY MOUTH TWICE DAILY for 4 days  THEN 1 TABLET DAILY for 4 days  THEN STOP..     busPIRone (BUSPAR) 5 MG tablet [Pharmacy Med Name: BUSPIRONE TAB 5 MG TABLET] 180 tablet 0     Sig: TAKE TWO TABLETS (10 MG) BY MOUTH 3 TIMES DAILY     traMADol (ULTRAM) 50 mg tablet [Pharmacy Med Name: TRAMADOL HCL 50MG TAB TABLET] 60 tablet 0     Sig: TAKE ONE TABLET BY MOUTH TWICE A DAY     albuterol (PROAIR HFA;PROVENTIL HFA;VENTOLIN HFA) 90 mcg/actuation inhaler [Pharmacy Med Name: ALBUTEROL    AER HFA  INHALANT] 54 each 0     Sig: INHALE 2 PUFFS BY MOUTH EVERY 6 HOURS AS NEEDED FOR WHEEZING     Date Last Fill: 12/2/19  Ph[facility]    Submit electronically to pharmacy  Controlled Substance Agreement on File:   Encounter-Level CSA Scan Date:    There are no encounter-level csa scan date.       Last office visit: Last office visit pertaining to requested medication was 11/25/19.    Rx renewed per Medication Renewal policy  Pro air    Provider Refill Request  Medication:  buspar  RN can NOT refill this medication per RN refill protocol because pt state is taking diffently than sig please review

## 2021-06-05 VITALS
SYSTOLIC BLOOD PRESSURE: 138 MMHG | WEIGHT: 136 LBS | HEART RATE: 90 BPM | DIASTOLIC BLOOD PRESSURE: 84 MMHG | BODY MASS INDEX: 29.43 KG/M2

## 2021-06-05 NOTE — PROGRESS NOTES
OFFICE VISIT NOTE    Subjective:   Chief Complaint:  Follow-up (Fall)    88-year-old woman who is in for follow-up regarding recent visit to the emergency room.  She had fallen at home approximate December 22.  Week later she was still having headaches once emergency room.  CT scan suggested possibility of a subdural hematoma.  She was observed about 12 hours.  Repeat CT did not show any brain hemorrhage.  She was sent home and she has been okay since then.  Some slight headaches.  No dizziness.  No vomiting.  No loss of vision.  No rapid heartbeats or shortness of breath.  No bleeding in the GI tract.  [facility]    Current Outpatient Medications   Medication Sig     albuterol (PROAIR HFA;PROVENTIL HFA;VENTOLIN HFA) 90 mcg/actuation inhaler INHALE 2 PUFFS BY MOUTH EVERY 6 HOURS AS NEEDED FOR WHEEZING     atorvastatin (LIPITOR) 10 MG tablet TAKE ONE TABLET BY MOUTH ONCE DAILY     busPIRone (BUSPAR) 5 MG tablet TAKE TWO TABLETS (10 MG) BY MOUTH 3 TIMES DAILY     cholecalciferol, vitamin D3, (CHOLECALCIFEROL) 1,000 unit tablet Take 1,000 Units by mouth daily.     DILT- mg 24 hr capsule Take 180 mg by mouth daily.     DULoxetine (CYMBALTA) 30 MG capsule TAKE ONE CAPSULE BY MOUTH TWICE A DAY     ELIQUIS 5 mg Tab tablet TAKE 1 TABLET (5 MG TOTAL) BY MOUTH 2 (TWO) TIMES A DAY.     furosemide (LASIX) 80 MG tablet TAKE ONE-HALF TAB (40MG) BY MOUTH ONCE DAILY     levothyroxine (SYNTHROID, LEVOTHROID) 88 MCG tablet Take 1 tablet (88 mcg total) by mouth daily.     losartan (COZAAR) 100 MG tablet Take 100 mg by mouth daily.     melatonin 3 mg Tab tablet Take 1 tablet (3 mg total) by mouth bedtime as needed.     metoprolol succinate (TOPROL-XL) 50 MG 24 hr tablet Take 1.5 tablets (75 mg total) by mouth daily.     montelukast (SINGULAIR) 10 mg tablet Take 10 mg by mouth at bedtime as needed.      multivitamin therapeutic (THERAGRAN) tablet Take 1 tablet by mouth daily.     NITROSTAT 0.4 mg SL tablet  "Place 0.4 mg under the tongue every 5 (five) minutes as needed for chest pain.      omeprazole (PRILOSEC) 20 MG capsule Take 1 capsule (20 mg total) by mouth 2 (two) times a day before meals.     peg 400-propylene glycol (SYSTANE) 0.4-0.3 % Drop Administer 1 drop to both eyes 4 (four) times a day as needed (dry eye).      potassium chloride (K-DUR,KLOR-CON) 20 MEQ tablet TAKE ONE TABLET BY MOUTH ONCE DAILY     predniSONE (DELTASONE) 5 MG tablet TAKE 1 TABLET BY MOUTH TWICE DAILY for 4 days  THEN 1 TABLET DAILY for 4 days  THEN STOP..     traMADol (ULTRAM) 50 mg tablet Take 1 tablet (50 mg total) by mouth 2 (two) times a day as needed for pain. 1 tab p.o. twice daily     UNABLE TO FIND Take 2 capsules by mouth daily. Med Name: Omega XL ([employer] endorsed product)       PSFHx: Tobacco Status:  She  reports that she quit smoking about 45 years ago. Her smoking use included cigarettes. She has a 7.50 pack-year smoking history. She has never used smokeless tobacco.    Review of Systems:  A comprehensive review of systems is negative except for the comments above    Objective:    Ht 4' 9\" (1.448 m)   Wt 136 lb (61.7 kg)   LMP  (LMP Unknown)   BMI 29.43 kg/m    GENERAL: No acute distress.  She looks good.  She has a hematoma on the occiput of the scalp.  Dried blood only.  Neurologic exam is normal.  Speech is normal  Memory is normal.  Moves all extremities well.  Good range of motion of the neck.  Lungs are clear  Heart shows atrial fibrillation rate in the  range.  Blood pressure 138/78    Assessment & Plan   Tori Do is a 88 y.o. female.    She is stable after a fall approximately December 22.  Closed head trauma with laceration of the scalp.  No CNS injury.  Atrial fibrillation seems to be well controlled.  No signs of heart failure.  Blood pressure is at goal.  I will see her again in 2 months.  That time we will check a lipid profile, hemoglobin, basic metabolic profile.  While here, I did clean up " the dry hematoma on the scalp.  Diagnoses and all orders for this visit:    Injury of head, subsequent encounter    Essential hypertension    Chronic atrial fibrillation            Master Leyva MD  Transcription using voice recognition software, may contain typographical errors.

## 2021-06-05 NOTE — TELEPHONE ENCOUNTER
Refill Approved    Rx renewed per Medication Renewal Policy. Medication was last renewed on 1/6/20.    Valencia Velasco, [facility] Triage/Med Refill 2/1/2020     Requested Prescriptions   Pending Prescriptions Disp Refills     busPIRone (BUSPAR) 5 MG tablet [Pharmacy Med Name: BUSPIRONE TAB 5 MG TABLET] 180 tablet 0     Sig: TAKE TWO TABLETS (10 MG) BY MOUTH 3 TIMES DAILY       Tricyclics/Misc Antidepressant/Antianxiety Meds Refill Protocol Passed - 1/31/2020 11:12 AM        Passed - PCP or prescribing provider visit in last year     Last office visit with prescriber/PCP: 1/20/2020 Master Leyva MD OR same dept: 1/20/2020 Master Leyva MD OR same specialty: 1/20/2020 Master Leyva MD  Last physical: Visit date not found Last MTM visit: Visit date not found   Next visit within 3 mo: Visit date not found  Next physical within 3 mo: Visit date not found  Prescriber OR PCP: Master Leyva MD  Last diagnosis associated with med order: 1. Adjustment disorder with mixed anxiety and depressed mood  - busPIRone (BUSPAR) 5 MG tablet [Pharmacy Med Name: BUSPIRONE TAB 5 MG TABLET]; TAKE TWO TABLETS (10 MG) BY MOUTH 3 TIMES DAILY  Dispense: 180 tablet; Refill: 0    2. Atrial fibrillation -- S/P cardioversion 2015  - ELIQUIS 5 mg Tab tablet [Pharmacy Med Name: ELIQUIS 5MG TAB TABLET]; TAKE 1 TABLET (5 MG TOTAL) BY MOUTH 2 (TWO) TIMES A DAY.  Dispense: 60 tablet; Refill: 2    If protocol passes may refill for 12 months if within 3 months of last provider visit (or a total of 15 months).             ELIQUIS 5 mg Tab tablet [Pharmacy Med Name: ELIQUIS 5MG TAB TABLET] 60 tablet 2     Sig: TAKE 1 TABLET (5 MG TOTAL) BY MOUTH 2 (TWO) TIMES A DAY.       Apixaban/Rivaroxaban/Dabigatran Refill Protocol Failed - 1/31/2020 11:12 AM        Failed - Route to appropriate pool/provider     Last Anticoagulation Summary:           Passed - Renal function test in last year     Creatinine   Date Value Ref Range Status    12/27/2019 1.25 (H) 0.60 - 1.10 mg/dL Final   12/27/2019 1.21 (H) 0.60 - 1.10 mg/dL Final             Passed - PCP or prescribing provider visit in past 12 months       Last office visit with prescriber/PCP: 1/20/2020 Master Leyva MD OR same dept: 1/20/2020 Master Leyva MD OR same specialty: 1/20/2020 Master Leyva MD  Last physical: Visit date not found Last MTM visit: Visit date not found   Next visit within 3 mo: Visit date not found  Next physical within 3 mo: Visit date not found  Prescriber OR PCP: Master Leyva MD  Last diagnosis associated with med order: 1. Adjustment disorder with mixed anxiety and depressed mood  - busPIRone (BUSPAR) 5 MG tablet [Pharmacy Med Name: BUSPIRONE TAB 5 MG TABLET]; TAKE TWO TABLETS (10 MG) BY MOUTH 3 TIMES DAILY  Dispense: 180 tablet; Refill: 0    2. Atrial fibrillation -- S/P cardioversion 2015  - ELIQUIS 5 mg Tab tablet [Pharmacy Med Name: ELIQUIS 5MG TAB TABLET]; TAKE 1 TABLET (5 MG TOTAL) BY MOUTH 2 (TWO) TIMES A DAY.  Dispense: 60 tablet; Refill: 2    If protocol passes may refill for 12 months if within 3 months of last provider visit (or a total of 15 months).

## 2021-06-05 NOTE — TELEPHONE ENCOUNTER
RN cannot approve Refill Request    RN can NOT refill this medication Protocol failed and NO refill given.      Valencia Velasco, [facility] Triage/Med Refill 2/1/2020    Requested Prescriptions   Pending Prescriptions Disp Refills     ELIQUIS 5 mg Tab tablet [Pharmacy Med Name: ELIQUIS 5MG TAB TABLET] 60 tablet 2     Sig: TAKE 1 TABLET (5 MG TOTAL) BY MOUTH 2 (TWO) TIMES A DAY.       Apixaban/Rivaroxaban/Dabigatran Refill Protocol Failed - 1/31/2020 11:12 AM        Failed - Route to appropriate pool/provider     Last Anticoagulation Summary:           Passed - Renal function test in last year     Creatinine   Date Value Ref Range Status   12/27/2019 1.25 (H) 0.60 - 1.10 mg/dL Final   12/27/2019 1.21 (H) 0.60 - 1.10 mg/dL Final             Passed - PCP or prescribing provider visit in past 12 months       Last office visit with prescriber/PCP: 1/20/2020 Master Leyva MD OR same dept: 1/20/2020 Master Leyva MD OR same specialty: 1/20/2020 Master Leyva MD  Last physical: Visit date not found Last MTM visit: Visit date not found   Next visit within 3 mo: Visit date not found  Next physical within 3 mo: Visit date not found  Prescriber OR PCP: Master Leyva MD  Last diagnosis associated with med order: 1. Adjustment disorder with mixed anxiety and depressed mood  - busPIRone (BUSPAR) 5 MG tablet; TAKE TWO TABLETS (10 MG) BY MOUTH 3 TIMES DAILY  Dispense: 540 tablet; Refill: 3    2. Atrial fibrillation -- S/P cardioversion 2015  - ELIQUIS 5 mg Tab tablet [Pharmacy Med Name: ELIQUIS 5MG TAB TABLET]; TAKE 1 TABLET (5 MG TOTAL) BY MOUTH 2 (TWO) TIMES A DAY.  Dispense: 60 tablet; Refill: 2    If protocol passes may refill for 12 months if within 3 months of last provider visit (or a total of 15 months).           Signed Prescriptions Disp Refills    busPIRone (BUSPAR) 5 MG tablet 540 tablet 3     Sig: TAKE TWO TABLETS (10 MG) BY MOUTH 3 TIMES DAILY       Tricyclics/Misc Antidepressant/Antianxiety Meds  Refill Protocol Passed - 1/31/2020 11:12 AM        Passed - PCP or prescribing provider visit in last year     Last office visit with prescriber/PCP: 1/20/2020 Master Leyva MD OR same dept: 1/20/2020 Master Leyva MD OR same specialty: 1/20/2020 Master Leyva MD  Last physical: Visit date not found Last MTM visit: Visit date not found   Next visit within 3 mo: Visit date not found  Next physical within 3 mo: Visit date not found  Prescriber OR PCP: Master Leyva MD  Last diagnosis associated with med order: 1. Adjustment disorder with mixed anxiety and depressed mood  - busPIRone (BUSPAR) 5 MG tablet; TAKE TWO TABLETS (10 MG) BY MOUTH 3 TIMES DAILY  Dispense: 540 tablet; Refill: 3    2. Atrial fibrillation -- S/P cardioversion 2015  - ELIQUIS 5 mg Tab tablet [Pharmacy Med Name: ELIQUIS 5MG TAB TABLET]; TAKE 1 TABLET (5 MG TOTAL) BY MOUTH 2 (TWO) TIMES A DAY.  Dispense: 60 tablet; Refill: 2    If protocol passes may refill for 12 months if within 3 months of last provider visit (or a total of 15 months).

## 2021-06-05 NOTE — TELEPHONE ENCOUNTER
Last saw Dr. Lopez 1/20/2020, at which time systolic blood pressure was in the 130s.  HCTZ was prescribed back in 2015 and I am unsure why it was refilled on 2/1/2020.  Furosemide is meant to be 40 mg (0.5 tab) daily.  Goal systolic blood pressure for an individual over age of 80 is less than 150 mmHg.  I would recommend not to continue the HCTZ (and discontinue it from the med list) and refill the furosemide for the 40 mg daily.  She can come back to clinic in 1 to 2 weeks to have her blood pressure checked.  I hope this helps.

## 2021-06-05 NOTE — TELEPHONE ENCOUNTER
Medication Question or Clarification    Who is calling:   Pharmacy    What medication are you calling about (include dose and sig)?:    1. furosemide (LASIX) 80 MG tablet    2. hydroCHLOROthiazide (HYDRODIURIL) 25 MG tablet    Who prescribed the medication?: PCP    What is your question/concern?:   Pharmacy questions if patient is suppose to be on both of these medications?    Requested Pharmacy:   [address]     Okay to leave a detailed message?:   Yes    Please call pharmacy to clarify medication and dosing instructions.

## 2021-06-05 NOTE — TELEPHONE ENCOUNTER
Refill Approved    Rx renewed per Medication Renewal Policy. Medication was last renewed on 1/24/2019 with 3 refills.  Last office visit: 1/20/2020 with PCP Dr SNEHA Cast, [facility] Triage/Med Refill 2/1/2020     Requested Prescriptions   Pending Prescriptions Disp Refills     levothyroxine (SYNTHROID, LEVOTHROID) 88 MCG tablet [Pharmacy Med Name: LEVOTHYROXINE 88MCG TAB TABLET] 90 tablet 3     Sig: TAKE ONE TABLET (88MCG) BY MOUTH ONCE DAILY       Thyroid Hormones Protocol Passed - 1/31/2020 11:14 AM        Passed - Provider visit in past 12 months or next 3 months     Last office visit with prescriber/PCP: 2/4/2019 Ran Yin MD OR same dept: 1/20/2020 Master Leyva MD OR same specialty: 1/20/2020 Master Leyva MD  Last physical: 3/26/2019 Last MTM visit: Visit date not found   Next visit within 3 mo: Visit date not found  Next physical within 3 mo: Visit date not found  Prescriber OR PCP: Ran Yin MD  Last diagnosis associated with med order: 1. Hypothyroidism  - levothyroxine (SYNTHROID, LEVOTHROID) 88 MCG tablet [Pharmacy Med Name: LEVOTHYROXINE 88MCG TAB TABLET]; TAKE ONE TABLET (88MCG) BY MOUTH ONCE DAILY  Dispense: 90 tablet; Refill: 3    If protocol passes may refill for 12 months if within 3 months of last provider visit (or a total of 15 months).             Passed - TSH on file in past 12 months for patient age 12 & older     TSH   Date Value Ref Range Status   02/04/2019 2.43 0.30 - 5.00 uIU/mL Final

## 2021-06-05 NOTE — TELEPHONE ENCOUNTER
Refill Approved    Rx renewed per Medication Renewal Policy. Medication was last renewed on 11/30/19.    Valencia Velasco, [facility] Triage/Med Refill 2/1/2020     Requested Prescriptions   Pending Prescriptions Disp Refills     losartan (COZAAR) 100 MG tablet [Pharmacy Med Name: LOSARTAN 100MG TAB TRANSDERMAL PATCH] 30 tablet 0     Sig: TAKE ONE TABLET BY MOUTH ONCE DAILY (ALONG WITH HCTZ 25MG TAB)       Angiotensin Receptor Blocker Protocol Passed - 1/31/2020 11:31 AM        Passed - PCP or prescribing provider visit in past 12 months       Last office visit with prescriber/PCP: Visit date not found OR same dept: 1/20/2020 Master Leyva MD OR same specialty: 1/20/2020 Master Leyva MD  Last physical: Visit date not found Last MTM visit: Visit date not found   Next visit within 3 mo: Visit date not found  Next physical within 3 mo: Visit date not found  Prescriber OR PCP: Lobito Sousa MD  Last diagnosis associated with med order: There are no diagnoses linked to this encounter.  If protocol passes may refill for 12 months if within 3 months of last provider visit (or a total of 15 months).             Passed - Serum potassium within the past 12 months     Lab Results   Component Value Date    Potassium 4.3 12/27/2019    Potassium 4.3 12/27/2019             Passed - Blood pressure filed in past 12 months     BP Readings from Last 1 Encounters:   01/20/20 138/78             Passed - Serum creatinine within the past 12 months     Creatinine   Date Value Ref Range Status   12/27/2019 1.25 (H) 0.60 - 1.10 mg/dL Final   12/27/2019 1.21 (H) 0.60 - 1.10 mg/dL Final             hydroCHLOROthiazide (HYDRODIURIL) 25 MG tablet [Pharmacy Med Name: HYDROCHLOROTHIAZIDE 25MG TABLET] 30 tablet 0     Sig: TAKE ONE TABLET BY MOUTH ONCE DAILY ALONG WITH LOSARTAN 100MG TAB       Diuretics/Combination Diuretics Refill Protocol  Passed - 1/31/2020 11:31 AM        Passed - Visit with PCP or prescribing provider visit in  past 12 months     Last office visit with prescriber/PCP: Visit date not found OR same dept: 1/20/2020 Master Leyva MD OR same specialty: 1/20/2020 Master Leyva MD  Last physical: Visit date not found Last MTM visit: Visit date not found   Next visit within 3 mo: Visit date not found  Next physical within 3 mo: Visit date not found  Prescriber OR PCP: Lobito Sousa MD  Last diagnosis associated with med order: There are no diagnoses linked to this encounter.  If protocol passes may refill for 12 months if within 3 months of last provider visit (or a total of 15 months).             Passed - Serum Potassium in past 12 months      Lab Results   Component Value Date    Potassium 4.3 12/27/2019    Potassium 4.3 12/27/2019             Passed - Serum Sodium in past 12 months      Lab Results   Component Value Date    Sodium 139 12/27/2019    Sodium 139 12/27/2019             Passed - Blood pressure on file in past 12 months     BP Readings from Last 1 Encounters:   01/20/20 138/78             Passed - Serum Creatinine in past 12 months      Creatinine   Date Value Ref Range Status   12/27/2019 1.25 (H) 0.60 - 1.10 mg/dL Final   12/27/2019 1.21 (H) 0.60 - 1.10 mg/dL Final

## 2021-06-05 NOTE — TELEPHONE ENCOUNTER
RN cannot approve Refill Request    RN can NOT refill this medication medication not on med list.     Valencia Velasco, [facility] Triage/Med Refill 2/1/2020    Requested Prescriptions   Pending Prescriptions Disp Refills     hydroCHLOROthiazide (HYDRODIURIL) 25 MG tablet [Pharmacy Med Name: HYDROCHLOROTHIAZIDE 25MG TABLET] 30 tablet 0     Sig: TAKE ONE TABLET BY MOUTH ONCE DAILY ALONG WITH LOSARTAN 100MG TAB       Diuretics/Combination Diuretics Refill Protocol  Passed - 1/31/2020 11:31 AM        Passed - Visit with PCP or prescribing provider visit in past 12 months     Last office visit with prescriber/PCP: Visit date not found OR same dept: 1/20/2020 Master Leyva MD OR same specialty: 1/20/2020 Master Leyva MD  Last physical: Visit date not found Last MTM visit: Visit date not found   Next visit within 3 mo: Visit date not found  Next physical within 3 mo: Visit date not found  Prescriber OR PCP: Lobito Sousa MD  Last diagnosis associated with med order: 1. Essential hypertension  - losartan (COZAAR) 100 MG tablet; TAKE ONE TABLET BY MOUTH ONCE DAILY (ALONG WITH HCTZ 25MG TAB)  Dispense: 90 tablet; Refill: 3    If protocol passes may refill for 12 months if within 3 months of last provider visit (or a total of 15 months).             Passed - Serum Potassium in past 12 months      Lab Results   Component Value Date    Potassium 4.3 12/27/2019    Potassium 4.3 12/27/2019             Passed - Serum Sodium in past 12 months      Lab Results   Component Value Date    Sodium 139 12/27/2019    Sodium 139 12/27/2019             Passed - Blood pressure on file in past 12 months     BP Readings from Last 1 Encounters:   01/20/20 138/78             Passed - Serum Creatinine in past 12 months      Creatinine   Date Value Ref Range Status   12/27/2019 1.25 (H) 0.60 - 1.10 mg/dL Final   12/27/2019 1.21 (H) 0.60 - 1.10 mg/dL Final           Signed Prescriptions Disp Refills    losartan (COZAAR) 100 MG tablet  90 tablet 3     Sig: TAKE ONE TABLET BY MOUTH ONCE DAILY (ALONG WITH HCTZ 25MG TAB)       Angiotensin Receptor Blocker Protocol Passed - 1/31/2020 11:31 AM        Passed - PCP or prescribing provider visit in past 12 months       Last office visit with prescriber/PCP: Visit date not found OR same dept: 1/20/2020 Master Leyva MD OR same specialty: 1/20/2020 Master Leyva MD  Last physical: Visit date not found Last MTM visit: Visit date not found   Next visit within 3 mo: Visit date not found  Next physical within 3 mo: Visit date not found  Prescriber OR PCP: Lobito Sousa MD  Last diagnosis associated with med order: 1. Essential hypertension  - losartan (COZAAR) 100 MG tablet; TAKE ONE TABLET BY MOUTH ONCE DAILY (ALONG WITH HCTZ 25MG TAB)  Dispense: 90 tablet; Refill: 3    If protocol passes may refill for 12 months if within 3 months of last provider visit (or a total of 15 months).             Passed - Serum potassium within the past 12 months     Lab Results   Component Value Date    Potassium 4.3 12/27/2019    Potassium 4.3 12/27/2019             Passed - Blood pressure filed in past 12 months     BP Readings from Last 1 Encounters:   01/20/20 138/78             Passed - Serum creatinine within the past 12 months     Creatinine   Date Value Ref Range Status   12/27/2019 1.25 (H) 0.60 - 1.10 mg/dL Final   12/27/2019 1.21 (H) 0.60 - 1.10 mg/dL Final

## 2021-06-05 NOTE — TELEPHONE ENCOUNTER
Spoke with the patient and relayed message below from Dr. Iraheta.  Patient verbalized understanding and had no further questions at this time.    Spoke with the pharmacist, Lakia, and relayed message below from Dr. Iraheta.  She verbalized understanding and had no further questions at this time.  Libra RICHARDS CMA/CORTEZ....................1:20 PM

## 2021-06-05 NOTE — TELEPHONE ENCOUNTER
Dr. Iraheta,  Please review message below and advise.  I don't see that the furosemide was ever discontinued, but the HCTZ was refilled by Dr. Leyva on 2/1/2020.  Please advise.  Thank you.  Libra RICHARDS CMA/CORTEZ....................11:19 AM

## 2021-06-06 NOTE — TELEPHONE ENCOUNTER
Refill Approved    Rx renewed per Medication Renewal Policy. Medication was last renewed on 12/28/19.    Ev Quintanilla, [facility] Triage/Med Refill 3/4/2020     Requested Prescriptions   Pending Prescriptions Disp Refills     metoprolol succinate (TOPROL-XL) 50 MG 24 hr tablet [Pharmacy Med Name: METOPROL SUC TAB 50MG ER TABLET] 30 tablet 0     Sig: TAKE 1 AND 1/2 TABLETS (75MG) BY MOUTH ONCE DAILY       Beta-Blockers Refill Protocol Passed - 3/4/2020  4:33 PM        Passed - PCP or prescribing provider visit in past 12 months or next 3 months     Last office visit with prescriber/PCP: 1/20/2020 Master Leyva MD OR same dept: 1/20/2020 Master Leyva MD OR same specialty: 1/20/2020 Master Leyva MD  Last physical: Visit date not found Last MTM visit: Visit date not found   Next visit within 3 mo: Visit date not found  Next physical within 3 mo: Visit date not found  Prescriber OR PCP: Master Leyva MD  Last diagnosis associated with med order: 1. Chronic atrial fibrillation  - metoprolol succinate (TOPROL-XL) 50 MG 24 hr tablet [Pharmacy Med Name: METOPROL SUC TAB 50MG ER TABLET]; TAKE 1 AND 1/2 TABLETS (75MG) BY MOUTH ONCE DAILY  Dispense: 30 tablet; Refill: 0    If protocol passes may refill for 12 months if within 3 months of last provider visit (or a total of 15 months).             Passed - Blood pressure filed in past 12 months     BP Readings from Last 1 Encounters:   01/20/20 138/78

## 2021-06-06 NOTE — TELEPHONE ENCOUNTER
Controlled Substance Refill Request  Medication Name:   Requested Prescriptions     Pending Prescriptions Disp Refills     traMADoL (ULTRAM) 50 mg tablet [Pharmacy Med Name: TRAMADOL 50MG TAB TABLET] 60 tablet 0     Sig: TAKE 1 TABLET (50 MG TOTAL) BY MOUTH 2 (TWO) TIMES A DAY AS NEEDED FOR PAIN.     Date Last Fill: 1/6/20  [facility]  Submit electronically to pharmacy  Controlled Substance Agreement on file:   Encounter-Level CSA Scan Date:    There are no encounter-level csa scan date.        Last office visit:  1/20/20

## 2021-06-07 NOTE — TELEPHONE ENCOUNTER
Controlled Substance Refill Request  Medication Name:   Requested Prescriptions     Pending Prescriptions Disp Refills     traMADoL (ULTRAM) 50 mg tablet [Pharmacy Med Name: TRAMADOL HCL TAB 50MG TABLET] 60 tablet 0     Sig: TAKE 1 TABLET (50 MG TOTAL) BY MOUTH 2 (TWO) TIMES A DAY AS NEEDED FOR PAIN.     Date Last Fill: 3/27/20  Requested Pharmacy: [facility]  Submit electronically to pharmacy  Controlled Substance Agreement on file:   Encounter-Level CSA Scan Date:    There are no encounter-level csa scan date.        Last office visit:  11/25/19  Lizzie Alejandra RN, MA  [facility]    Triage Nurse Advisor

## 2021-06-07 NOTE — TELEPHONE ENCOUNTER
Prescription Monitoring Program activity reviewed with no discrepancies noted.      Last fill per : 03/03/1920  Quantity/days supply: 60    Controlled Substance Agreement on file: No  Date:     Last office visit with provider:  1/20/2020 Master Leyva MD    Please advise.

## 2021-06-07 NOTE — TELEPHONE ENCOUNTER
Controlled Substance Refill Request  Medication Name:   Requested Prescriptions     Pending Prescriptions Disp Refills     traMADoL (ULTRAM) 50 mg tablet [Pharmacy Med Name: TRAMADOL HCL 50MG TAB TABLET] 60 tablet 0     Sig: TAKE 1 TABLET (50 MG TOTAL) BY MOUTH 2 (TWO) TIMES A DAY AS NEEDED FOR PAIN.     albuterol (PROAIR HFA;PROVENTIL HFA;VENTOLIN HFA) 90 mcg/actuation inhaler [Pharmacy Med Name: ALBUTEROL    AER HFA  INHALANT] 54 each 0     Sig: INHALE 2 PUFFS BY MOUTH EVERY 6 HOURS AS NEEDED FOR WHEEZING     Date Last Fill: 3/3/20  Requested [facility]acy: [facility]  Submit electronically to pharmacy  Controlled Substance Agreement on file:   Encounter-Level CSA Scan Date:    There are no encounter-level csa scan date.        Last office visit:  1/20/20       Rx renewed per Medication Renewal policy  albuterol

## 2021-06-07 NOTE — TELEPHONE ENCOUNTER
RN cannot approve Refill Request    RN can NOT refill this medication PCP messaged that patient is overdue for Office Visit. Last office visit: 1/20/2020 Master Leyva MD Last Physical: Visit date not found Last MTM visit: Visit date not found Last visit same specialty: 1/20/2020 Master Leyva MD.  Next visit within 3 mo: Visit date not found  Next physical within 3 mo: Visit date not found      Lizzie Alejandra, Care Connection Triage/Med Refill 4/25/2020    Requested Prescriptions   Pending Prescriptions Disp Refills     ELIQUIS 5 mg Tab tablet [Pharmacy Med Name: ELIQUIS 5MG TAB TABLET] 60 tablet 2     Sig: TAKE 1 TABLET (5 MG TOTAL) BY MOUTH 2 (TWO) TIMES A DAY.       Apixaban/Rivaroxaban/Dabigatran Refill Protocol Failed - 4/24/2020 11:34 AM        Failed - Route to appropriate pool/provider     Last Anticoagulation Summary:           Passed - Renal function test in last year     Creatinine   Date Value Ref Range Status   12/27/2019 1.25 (H) 0.60 - 1.10 mg/dL Final   12/27/2019 1.21 (H) 0.60 - 1.10 mg/dL Final             Passed - PCP or prescribing provider visit in past 12 months       Last office visit with prescriber/PCP: 1/20/2020 Master Leyva MD OR same dept: 1/20/2020 Master Leyva MD OR same specialty: 1/20/2020 Master Leyva MD  Last physical: Visit date not found Last MTM visit: Visit date not found   Next visit within 3 mo: Visit date not found  Next physical within 3 mo: Visit date not found  Prescriber OR PCP: Master Leyva MD  Last diagnosis associated with med order: 1. Atrial fibrillation -- S/P cardioversion 2015  - ELIQUIS 5 mg Tab tablet [Pharmacy Med Name: ELIQUIS 5MG TAB TABLET]; TAKE 1 TABLET (5 MG TOTAL) BY MOUTH 2 (TWO) TIMES A DAY.  Dispense: 60 tablet; Refill: 2    2. Primary osteoarthritis involving multiple joints  - predniSONE (DELTASONE) 5 MG tablet [Pharmacy Med Name: PREDNISONE 5MG TAB TABLET]; TAKE 1 TABLET BY MOUTH TWICE DAILY for 4 days  THEN 1  TABLET DAILY for 4 days  THEN STOP..  Dispense: 12 tablet; Refill: 0    If protocol passes may refill for 12 months if within 3 months of last provider visit (or a total of 15 months).                predniSONE (DELTASONE) 5 MG tablet [Pharmacy Med Name: PREDNISONE 5MG TAB TABLET] 12 tablet 0     Sig: TAKE 1 TABLET BY MOUTH TWICE DAILY for 4 days  THEN 1 TABLET DAILY for 4 days  THEN STOP.       There is no refill protocol information for this order

## 2021-06-08 NOTE — TELEPHONE ENCOUNTER
Refill Approved    Rx renewed per Medication Renewal Policy. Medication was last renewed on 4/12/19.    Valencia Velasco, Care Connection Triage/Med Refill 5/28/2020     Requested Prescriptions   Pending Prescriptions Disp Refills     DULoxetine (CYMBALTA) 30 MG capsule [Pharmacy Med Name: DULOXETINE   CAP 30MG CAPSULE] 180 capsule 3     Sig: TAKE ONE CAPSULE BY MOUTH TWICE A DAY       Tricyclics/Misc Antidepressant/Antianxiety Meds Refill Protocol Passed - 5/26/2020 10:55 AM        Passed - PCP or prescribing provider visit in last year     Last office visit with prescriber/PCP: 1/20/2020 Master Leyva MD OR same dept: 1/20/2020 Master Leyva MD OR same specialty: 1/20/2020 Master Leyva MD  Last physical: Visit date not found Last MTM visit: Visit date not found   Next visit within 3 mo: Visit date not found  Next physical within 3 mo: Visit date not found  Prescriber OR PCP: Master Leyva MD  Last diagnosis associated with med order: 1. Fibromyalgia  - DULoxetine (CYMBALTA) 30 MG capsule [Pharmacy Med Name: DULOXETINE   CAP 30MG CAPSULE]; TAKE ONE CAPSULE BY MOUTH TWICE A DAY  Dispense: 180 capsule; Refill: 3    2. Essential hypertension  - furosemide (LASIX) 80 MG tablet [Pharmacy Med Name: FUROSEMIDE   TAB 80MG TABLET]; TAKE ONE-HALF TAB (40MG) BY MOUTH ONCE DAILY  Dispense: 45 tablet; Refill: 3    If protocol passes may refill for 12 months if within 3 months of last provider visit (or a total of 15 months).                furosemide (LASIX) 80 MG tablet [Pharmacy Med Name: FUROSEMIDE   TAB 80MG TABLET] 45 tablet 3     Sig: TAKE ONE-HALF TAB (40MG) BY MOUTH ONCE DAILY       Diuretics/Combination Diuretics Refill Protocol  Passed - 5/26/2020 10:55 AM        Passed - Visit with PCP or prescribing provider visit in past 12 months     Last office visit with prescriber/PCP: 1/20/2020 Master Leyva MD OR same dept: 1/20/2020 Master Leyva MD OR same specialty: 1/20/2020 Master Leyva  MD  Last physical: Visit date not found Last MTM visit: Visit date not found   Next visit within 3 mo: Visit date not found  Next physical within 3 mo: Visit date not found  Prescriber OR PCP: Master Leyva MD  Last diagnosis associated with med order: 1. Fibromyalgia  - DULoxetine (CYMBALTA) 30 MG capsule [Pharmacy Med Name: DULOXETINE   CAP 30MG CAPSULE]; TAKE ONE CAPSULE BY MOUTH TWICE A DAY  Dispense: 180 capsule; Refill: 3    2. Essential hypertension  - furosemide (LASIX) 80 MG tablet [Pharmacy Med Name: FUROSEMIDE   TAB 80MG TABLET]; TAKE ONE-HALF TAB (40MG) BY MOUTH ONCE DAILY  Dispense: 45 tablet; Refill: 3    If protocol passes may refill for 12 months if within 3 months of last provider visit (or a total of 15 months).             Passed - Serum Potassium in past 12 months      Lab Results   Component Value Date    Potassium 4.3 12/27/2019    Potassium 4.3 12/27/2019             Passed - Serum Sodium in past 12 months      Lab Results   Component Value Date    Sodium 139 12/27/2019    Sodium 139 12/27/2019             Passed - Blood pressure on file in past 12 months     BP Readings from Last 1 Encounters:   01/20/20 138/78             Passed - Serum Creatinine in past 12 months      Creatinine   Date Value Ref Range Status   12/27/2019 1.25 (H) 0.60 - 1.10 mg/dL Final   12/27/2019 1.21 (H) 0.60 - 1.10 mg/dL Final

## 2021-06-08 NOTE — PROGRESS NOTES
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  She has been well except for diffuse pains from her fibromyalgia.  She is not passing any blood.  No increasing dyspnea.    Current Outpatient Prescriptions   Medication Sig     albuterol (PROAIR HFA) 90 mcg/actuation inhaler Inhale 2 puffs every 4 (four) hours as needed for wheezing.     albuterol (PROVENTIL HFA;VENTOLIN HFA) 90 mcg/actuation inhaler Inhale 2 puffs every 6 (six) hours as needed for wheezing.     aspirin 81 MG EC tablet Take 81 mg by mouth daily with breakfast.     atenolol (TENORMIN) 50 MG tablet Take 1 tablet (50 mg total) by mouth daily.     atorvastatin (LIPITOR) 10 MG tablet Take 1 tablet (10 mg total) by mouth daily.     cholecalciferol, vitamin D3, (CHOLECALCIFEROL) 1,000 unit tablet Take 1,000 Units by mouth daily.     ferrous sulfate 325 (65 FE) MG tablet Take 1 tablet by mouth daily with breakfast.     furosemide (LASIX) 80 MG tablet Take 40 mg by mouth every other day. Take one half tablet (40 mg) every other day     HYDROcodone-acetaminophen 5-325 mg per tablet Take 1 tablet by mouth bedtime as needed for pain.     KLOR-CON M20 20 mEq tablet TAKE 1 TABLET (20 MEQ TOTAL) BY MOUTH DAILY.     levothyroxine (SYNTHROID, LEVOTHROID) 75 MCG tablet Take 1 tablet (75 mcg total) by mouth daily.     losartan-hydrochlorothiazide (HYZAAR) 100-25 mg per tablet Take 1 tablet by mouth daily.     melatonin 3 mg Tab tablet Take 1 tablet (3 mg total) by mouth bedtime as needed.     multivitamin therapeutic (THERAGRAN) tablet Take 1 tablet by mouth daily.     NITROSTAT 0.4 mg SL tablet Place 0.4 mg under the tongue every 5 (five) minutes as needed for chest pain.      omeprazole (PRILOSEC) 20 MG capsule Take 1 capsule (20 mg total) by mouth 2  "(two) times daily before lunch and supper.     peg 400-propylene glycol (SYSTANE) 0.4-0.3 % Drop Administer 1 drop to both eyes 4 (four) times a day as needed (dry eye).        PSFHx: Tobacco Status:  She  reports that she quit smoking about 42 years ago. Her smoking use included Cigarettes. She has a 7.50 pack-year smoking history. She has never used smokeless tobacco.    Review of Systems:  A comprehensive review of systems is negative except for the comments above    Objective:      Visit Vitals     Pulse (!) 56     Ht 4' 10\" (1.473 m)     Wt 145 lb (65.8 kg)     SpO2 94%     BMI 30.31 kg/m2     GENERAL: No acute distress.  She has stable weight.  Blood pressure 130/68.  Pulse 58 and regular.  Oxygen saturation is 97%.  2+ edema in the ankles and feet.  Lungs are clear of any rales.  Heart today shows regular rhythm.  She's had atrial fibrillation in the past but maintaining sinus rhythm at this time.  The abdomen was obese without masses or organomegaly.  Past painful osteoarthritis of the base of the thumbs bilaterally.  Right side more than the left.  Neurologically she seems stable.  No obvious focal weakness etc.  Memory is intact.  Speech gait is normal.    Assessment & Plan   Tori Do is a 85 y.o. female.    She seems stable.  We'll check a hemoglobin to make sure there is no occult bleeding.  Also check a basic metabolic profile.  Return to clinic in 2 months    Diagnoses and all orders for this visit:    Essential hypertension with goal blood pressure less than 140/90  -     Basic Metabolic Panel    Atrial fibrillation -- S/P cardioversion 2015    Iron deficiency anemia due to chronic blood loss  -     Hemoglobin    Fibromyalgia        The following high BMI interventions were performed this visit: encouragement to exercise    Master Leyva MD  Transcription using voice recognition software, may contain typographical errors.    "

## 2021-06-08 NOTE — TELEPHONE ENCOUNTER
Controlled Substance Refill Request  Medication Name:   Requested Prescriptions     Pending Prescriptions Disp Refills     traMADoL (ULTRAM) 50 mg tablet [Pharmacy Med Name: TRAMADOL HCL TAB 50MG TABLET] 60 tablet 0     Sig: TAKE 1 TABLET (50 MG TOTAL) BY MOUTH 2 (TWO) TIMES A DAY AS NEEDED FOR PAIN.     Date Last Fill: 4/27/20  Requested [facility]: [facility]  Submit electronically to pharmacy  Controlled Substance Agreement on file:   Encounter-Level CSA Scan Date:    There are no encounter-level csa scan date.        Last office visit:  1/20/20

## 2021-06-08 NOTE — TELEPHONE ENCOUNTER
Refill Approved    Rx renewed per Medication Renewal Policy. Medication was last renewed on 5/7/19.    Valencia Velasco, [facility] Connection Triage/Med Refill 5/27/2020     Requested Prescriptions   Pending Prescriptions Disp Refills     atorvastatin (LIPITOR) 10 MG tablet [Pharmacy Med Name: ATORVASTATIN 10MG TAB TABLET] 90 tablet 3     Sig: TAKE ONE TABLET BY MOUTH ONCE DAILY       Statins Refill Protocol (Hmg CoA Reductase Inhibitors) Passed - 5/25/2020 10:33 AM        Passed - PCP or prescribing provider visit in past 12 months      Last office visit with prescriber/PCP: Visit date not found OR same dept: 1/20/2020 Master Leyva MD OR same specialty: 1/20/2020 Master Leyva MD  Last physical: Visit date not found Last MTM visit: Visit date not found   Next visit within 3 mo: Visit date not found  Next physical within 3 mo: Visit date not found  Prescriber OR PCP: Lobito Sousa MD  Last diagnosis associated with med order: 1. Mixed hyperlipidemia  - atorvastatin (LIPITOR) 10 MG tablet [Pharmacy Med Name: ATORVASTATIN 10MG TAB TABLET]; TAKE ONE TABLET BY MOUTH ONCE DAILY  Dispense: 90 tablet; Refill: 3    If protocol passes may refill for 12 months if within 3 months of last provider visit (or a total of 15 months).

## 2021-06-08 NOTE — PATIENT INSTRUCTIONS - HE
Tori Do,    It was a pleasure to talk to you today from the [facility].     My recommendations after this visit include:    Decrease Eliquis to 5 mg 1/2 tab orally every  12 hours (Because of weight loss, you are recommended to be on lower dose.)  With refills, you can go to 2.5 mg 1 tab orally every 12 hours until it goes generic.  Increase metoprolol succinate 50 mg to 2 tabs at bedtime.  Take an extra furosemide 80 mg 1/2 tab orally today.  Call Dr. Leyva's office if breathing is not better tomorrow.    To followup with me in 1 year.      My contact information:  Bryan Manzano, ARNAV  After Hours or Scheduling  [phone]  My Nurse---Fay Townsend [phone]

## 2021-06-08 NOTE — PROGRESS NOTES
ROS:  Chest pain, Shortness of breath, leg swelling, fell in Dec. Headaches, nausea.  All other ROS is WNL.

## 2021-06-09 ENCOUNTER — RECORDS - HEALTHEAST (OUTPATIENT)
Dept: ADMINISTRATIVE | Facility: CLINIC | Age: 86
End: 2021-06-09

## 2021-06-09 NOTE — TELEPHONE ENCOUNTER
Controlled Substance Refill Request  Medication Name:   Requested Prescriptions     Pending Prescriptions Disp Refills     traMADoL (ULTRAM) 50 mg tablet [Pharmacy Med Name: TRAMADOL HCL TAB 50MG TABLET] 60 tablet 0     Sig: TAKE ONE TABLET (50 MG) BY MOUTH 2 TIMES A DAY AS NEEDED FOR PAIN.     Date Last Fill: 6/25/20  [facility]ed [facility]  Submit electronically to pharmacy  Controlled Substance Agreement on file:   Encounter-Level CSA Scan Date:    There are no encounter-level csa scan date.        Last office visit:  7/13/20

## 2021-06-09 NOTE — TELEPHONE ENCOUNTER
Spoke with patient and she has been taking 1 tab daily of furosemide 80mg since 6/6/20 for her edema. Per below OV she was only to increase for a day and call you which she didn't. She said she is breathing ok now. Do you want her to continue 80mg daily. She has an appointment with you on 7/13/20.       OV 6/6/20 cardiology  Patient Instructions     Tori Do,     It was a pleasure to talk to you today from the [facility].      My recommendations after this visit include:     Decrease Eliquis to 5 mg 1/2 tab orally every  12 hours (Because of weight loss, you are recommended to be on lower dose.)  With refills, you can go to 2.5 mg 1 tab orally every 12 hours until it goes generic.  Increase metoprolol succinate 50 mg to 2 tabs at bedtime.  Take an extra furosemide 80 mg 1/2 tab orally today.  Call Dr. Leyva's office if breathing is not better tomorrow.

## 2021-06-09 NOTE — PROGRESS NOTES
OFFICE VISIT NOTE    Subjective:   Chief Complaint:  Follow-up    89-year-old woman with a history of osteoarthritis as well as fibromyalgia and chronic pain syndrome.  Has a lot of pain in her left knee at this time.  She would like an injection if possible.  She gets around the house okay still does her housework.  No recent falls.  Several months ago she did fall hit the back of her head.  She is taking blood thinners because of chronic atrial fibrillation.    Current Outpatient Medications   Medication Sig     albuterol (PROAIR HFA;PROVENTIL HFA;VENTOLIN HFA) 90 mcg/actuation inhaler INHALE 2 PUFFS BY MOUTH EVERY 6 HOURS AS NEEDED FOR WHEEZING     apixaban ANTICOAGULANT (ELIQUIS) 2.5 mg Tab tablet Take 1 tablet (2.5 mg total) by mouth every 12 (twelve) hours.     atorvastatin (LIPITOR) 10 MG tablet TAKE ONE TABLET BY MOUTH ONCE DAILY     busPIRone (BUSPAR) 5 MG tablet TAKE TWO TABLETS (10 MG) BY MOUTH 3 TIMES DAILY     cholecalciferol, vitamin D3, (CHOLECALCIFEROL) 1,000 unit tablet Take 1,000 Units by mouth daily.     diltiazem (CARDIZEM CD) 180 MG 24 hr capsule TAKE ONE CAPSULE (180 MG) BY MOUTH ONCE DAILY     furosemide (LASIX) 80 MG tablet Take 1 tablet (80 mg total) by mouth daily.     hydroCHLOROthiazide (HYDRODIURIL) 25 MG tablet TAKE ONE TABLET BY MOUTH ONCE DAILY ALONG WITH LOSARTAN 100MG TAB     levothyroxine (SYNTHROID, LEVOTHROID) 88 MCG tablet Take 1 tablet (88 mcg total) by mouth daily.     losartan (COZAAR) 100 MG tablet TAKE ONE TABLET BY MOUTH ONCE DAILY (ALONG WITH HCTZ 25MG TAB)     melatonin 3 mg Tab tablet Take 1 tablet (3 mg total) by mouth bedtime as needed.     metoprolol succinate (TOPROL-XL) 50 MG 24 hr tablet Take 2 tablets (100 mg total) by mouth at bedtime.     montelukast (SINGULAIR) 10 mg tablet Take 10 mg by mouth at bedtime as needed.      multivitamin therapeutic (THERAGRAN) tablet Take 1 tablet by mouth daily.     naproxen sodium (ALEVE) 220 MG tablet Take 220 mg by mouth 2  (two) times a day with meals.     NITROSTAT 0.4 mg SL tablet Place 0.4 mg under the tongue every 5 (five) minutes as needed for chest pain.      omeprazole (PRILOSEC) 20 MG capsule Take 1 capsule (20 mg total) by mouth 2 (two) times a day before meals.     peg 400-propylene glycol (SYSTANE) 0.4-0.3 % Drop Administer 1 drop to both eyes 4 (four) times a day as needed (dry eye).      potassium chloride (K-DUR,KLOR-CON) 20 MEQ tablet TAKE ONE TABLET BY MOUTH ONCE DAILY     traMADoL (ULTRAM) 50 mg tablet TAKE ONE TABLET (50 MG) BY MOUTH 2 TIMES A DAY AS NEEDED FOR PAIN.     UNABLE TO FIND Take 2 capsules by mouth daily. Med Name: Omega XL ([employer] endorsed product)     DULoxetine (CYMBALTA) 30 MG capsule        Review of Systems:  A comprehensive review of systems is negative except for the comments above    Objective:    LMP  (LMP Unknown)   GENERAL: No acute distress.  She is in no distress.  She is alert and oriented.  Blood pressure 142/84.  Pulse is 96 atrial fibrillation is a rhythm.  2-3+ pitting edema with stasis dermatitis changes of the lower legs.  Lungs seem clear.  Heart shows persistent atrial fibrillation.  Significant osteoarthritis of the left knee.    Assessment & Plan   Tori Do is a 89 y.o. female.    Symptomatic osteoarthritis of the left knee.  I cleansed the skin with alcohol and Betadine.  I then injected 2 cc of 1% Xylocaine and 40 mg of Kenalog into the left knee using a medial approach.  There were no complications.  A bandage was applied.  She is instructed to apply ice packs for the next 30 minutes when she gets home.  She should continue her daily diuretic.  I advised a low-salt diet.  She admits to eating heavy amounts of salt.  Leg elevation will help.  Celebrex 100 mg daily advised for joint discomfort.  Diagnoses and all orders for this visit:    Primary osteoarthritis involving multiple joints  -     triamcinolone acetonide 40 mg/mL injection 40 mg  (KENALOG-40)    Fibromyalgia        Master Leyva MD  Transcription using voice recognition software, may contain typographical errors.

## 2021-06-09 NOTE — TELEPHONE ENCOUNTER
Controlled Substance Refill Request  Medication Name:   Requested Prescriptions     Pending Prescriptions Disp Refills     traMADoL (ULTRAM) 50 mg tablet [Pharmacy Med Name: TRAMADOL HCL 50MG TAB TABLET] 60 tablet 0     Sig: TAKE ONE TABLET (50 MG) BY MOUTH 2 TIMES A DAY AS NEEDED FOR PAIN.     albuterol (PROAIR HFA;PROVENTIL HFA;VENTOLIN HFA) 90 mcg/actuation inhaler [Pharmacy Med Name: ALBUTEROL HFA INH 90MCG INHALANT] 54 each 0     Sig: INHALE 2 PUFFS BY MOUTH EVERY 6 HOURS AS NEEDED FOR WHEEZING     Date Last Fill: 5/27/20  [facility]ed [facility]  Submit electronically to pharmacy  Controlled Substance Agreement on file:   Encounter-Level CSA Scan Date:    There are no encounter-level csa scan date.        Last office visit:  1/20/20         Rx renewed per Medication Renewal policy  albuterol

## 2021-06-09 NOTE — PROGRESS NOTES
OFFICE VISIT NOTE    Subjective:   Chief Complaint:  Hypertension (was put on dyazide last OV had to d/c due to dyspnea)    85-year-old woman in for follow-up regarding hypertension.  She stopped taking hydrochlorothiazide-losartan because it was causing some wheezing.  Dyazide apparently cause the same problem.  She actually been on losartan for several years.  She is feeling better off those 2 medications.  She does not have any chest pain.  No coughing or wheezing.  No orthopnea.    Current Outpatient Prescriptions   Medication Sig     albuterol (PROAIR HFA) 90 mcg/actuation inhaler Inhale 2 puffs every 4 (four) hours as needed for wheezing.     albuterol (PROVENTIL HFA;VENTOLIN HFA) 90 mcg/actuation inhaler Inhale 2 puffs every 6 (six) hours as needed for wheezing.     aspirin 81 MG EC tablet Take 81 mg by mouth daily with breakfast.     atenolol (TENORMIN) 50 MG tablet Take 1 tablet (50 mg total) by mouth daily.     atorvastatin (LIPITOR) 10 MG tablet Take 1 tablet (10 mg total) by mouth daily.     cholecalciferol, vitamin D3, (CHOLECALCIFEROL) 1,000 unit tablet Take 1,000 Units by mouth daily.     ferrous sulfate 325 (65 FE) MG tablet Take 1 tablet by mouth daily with breakfast.     furosemide (LASIX) 80 MG tablet Take 40 mg by mouth every other day. Take one half tablet (40 mg) every other day     HYDROcodone-acetaminophen 5-325 mg per tablet Take 1 tablet by mouth bedtime as needed for pain.     HYDROcodone-acetaminophen 5-325 mg per tablet Take 1 tablet by mouth every 8 (eight) hours as needed for pain.     KLOR-CON M20 20 mEq tablet TAKE 1 TABLET (20 MEQ TOTAL) BY MOUTH DAILY.     levothyroxine (SYNTHROID, LEVOTHROID) 75 MCG tablet TAKE 1 TABLET (75 MCG TOTAL) BY MOUTH DAILY.     melatonin 3 mg Tab tablet Take 1 tablet (3 mg total) by mouth bedtime as needed.     multivitamin therapeutic (THERAGRAN) tablet Take 1 tablet by mouth daily.     NITROSTAT 0.4 mg SL tablet Place 0.4 mg under the tongue every 5  "(five) minutes as needed for chest pain.      omeprazole (PRILOSEC) 20 MG capsule Take 1 capsule (20 mg total) by mouth 2 (two) times daily before lunch and supper.     peg 400-propylene glycol (SYSTANE) 0.4-0.3 % Drop Administer 1 drop to both eyes 4 (four) times a day as needed (dry eye).      lisinopril (PRINIVIL,ZESTRIL) 5 MG tablet Take 1 tablet (5 mg total) by mouth daily.     losartan-hydrochlorothiazide (HYZAAR) 100-25 mg per tablet Take 1 tablet by mouth daily.     triamterene-hydrochlorothiazide (DYAZIDE) 37.5-25 mg per capsule Take 1 capsule by mouth daily.       Review of Systems:  A comprehensive review of systems is negative except for the comments above    Objective:    Pulse (!) 55  Ht 4' 10\" (1.473 m)  Wt 146 lb (66.2 kg)  SpO2 94%  BMI 30.51 kg/m2  GENERAL: No acute distress.  Blood pressure has risen off her medication.  She is now 164/82.  Pulse is 60.  Lungs are free of any rales or wheezes.  Heart shows a sinus rhythm without any ectopic beats.  Trace edema of the legs.    Assessment & Plan   Tori Do is a 85 y.o. female.    Systolic hypertension.  I am going to start the medication lisinopril 5 mg daily in the morning.  I will bring her back in 3 weeks.  At that time we will check a renal profile along with a hemoglobin.  She will continue to take Lasix 40 mg every other day.  She is also taking atenolol 50 mg p.o. daily.    Diagnoses and all orders for this visit:    Essential hypertension with goal blood pressure less than 140/90  -     lisinopril (PRINIVIL,ZESTRIL) 5 MG tablet; Take 1 tablet (5 mg total) by mouth daily.  Dispense: 30 tablet; Refill: 2    Iron deficiency anemia due to chronic blood loss    AV malformation of GI tract        Master Leyva MD  Transcription using voice recognition software, may contain typographical errors.    "

## 2021-06-10 NOTE — TELEPHONE ENCOUNTER
Controlled Substance Refill Request  Medication Name:   Requested Prescriptions     Pending Prescriptions Disp Refills     traMADoL (ULTRAM) 50 mg tablet [Pharmacy Med Name: TRAMADOL HCL TAB 50MG TABLET] 60 tablet 0     Sig: TAKE ONE TABLET (50 MG) BY MOUTH 2 TIMES A DAY AS NEEDED FOR PAIN.     Date Last Fill: 07/21/2020.  Is patient out of medication?:  Yes  Patient notified refills processed within 3 business days:  Yes  Requested Pharmacy: [place], [place]  Submit electronically to pharmacy  Controlled Substance Agreement on file:   Encounter-Level CSA Scan Date:    There are no encounter-level csa scan date.        Last office visit:  07/13/2020 with PCP.

## 2021-06-10 NOTE — PROGRESS NOTES
OFFICE VISIT NOTE    Subjective:   Chief Complaint:  Follow-up (HTN had to d/c lisinopril due to side effects after 10 days. started back on losartan )    86-year-old woman with a history of hypertension, chronic GI bleeding from AV malformations, chronic pain from fibromyalgia.  She is doing about the same.  She still has bouts of shortness of breath and wheezing.  Albuterol does help.  She has chronic pain from the joints and muscles from fibromyalgia.  No significant recent GI bleeding to her knowledge.    Current Outpatient Prescriptions   Medication Sig     albuterol (PROAIR HFA) 90 mcg/actuation inhaler Inhale 2 puffs every 4 (four) hours as needed for wheezing.     albuterol (PROVENTIL HFA;VENTOLIN HFA) 90 mcg/actuation inhaler Inhale 2 puffs every 6 (six) hours as needed for wheezing.     aspirin 81 MG EC tablet Take 81 mg by mouth daily with breakfast.     atenolol (TENORMIN) 50 MG tablet Take 1 tablet (50 mg total) by mouth daily.     atorvastatin (LIPITOR) 10 MG tablet Take 1 tablet (10 mg total) by mouth daily.     cholecalciferol, vitamin D3, (CHOLECALCIFEROL) 1,000 unit tablet Take 1,000 Units by mouth daily.     ferrous sulfate 325 (65 FE) MG tablet Take 1 tablet by mouth daily with breakfast.     furosemide (LASIX) 80 MG tablet Take 40 mg by mouth daily.      HYDROcodone-acetaminophen 5-325 mg per tablet Take 1 tablet by mouth every 8 (eight) hours as needed for pain.     KLOR-CON M20 20 mEq tablet TAKE 1 TABLET (20 MEQ TOTAL) BY MOUTH DAILY.     levothyroxine (SYNTHROID, LEVOTHROID) 75 MCG tablet TAKE 1 TABLET (75 MCG TOTAL) BY MOUTH DAILY.     losartan-hydrochlorothiazide (HYZAAR) 100-25 mg per tablet Take 1 tablet by mouth daily.     melatonin 3 mg Tab tablet Take 1 tablet (3 mg total) by mouth bedtime as needed.     multivitamin therapeutic (THERAGRAN) tablet Take 1 tablet by mouth daily.     NITROSTAT 0.4 mg SL tablet Place 0.4 mg under the tongue every 5 (five) minutes as needed for chest pain.  "     omeprazole (PRILOSEC) 20 MG capsule Take 1 capsule (20 mg total) by mouth 2 (two) times daily before lunch and supper.     peg 400-propylene glycol (SYSTANE) 0.4-0.3 % Drop Administer 1 drop to both eyes 4 (four) times a day as needed (dry eye).        PSFHx: Tobacco Status:  She  reports that she quit smoking about 42 years ago. Her smoking use included Cigarettes. She has a 7.50 pack-year smoking history. She has never used smokeless tobacco.    Review of Systems:  A comprehensive review of systems is negative except for the comments above    Objective:    Pulse (!) 57  Ht 4' 10\" (1.473 m)  Wt 147 lb (66.7 kg)  SpO2 95%  BMI 30.72 kg/m2  GENERAL: No acute distress.  Blood pressure today still high 154/70.  Pulse 60 and regular.  Lungs have a few slight wheezes but no rales.  1-2+ edema of the lower legs.  Heart shows a sinus rhythm without murmur gallop or rub.  The abdomen is obese without hepatosplenomegaly.  She obviously has some kyphosis of the spine.  She does have osteoporosis.  Osteoarthritic changes of the fingers.  Diffuse muscle tenderness consistent with fibromyalgia.    Assessment & Plan   Tori Do is a 86 y.o. female.    Systolic hypertension still not under good control.  Increase losartan to 100 mg in the morning adding 50 mg in the afternoon.  I did give her a prescription for hydrocodone-acetaminophen but only 10 tablets to be used when she travels on vacation.  She uses these sparingly for joint pain.  Hemoglobin and chemistry survey drawn today.  Return to clinic in 4 weeks for blood pressure check.  Diagnoses and all orders for this visit:    Essential hypertension with goal blood pressure less than 140/90  -     Comprehensive Metabolic Panel    Gastrointestinal hemorrhage, unspecified gastrointestinal hemorrhage type  -     Hemoglobin    Fibromyalgia  -     HYDROcodone-acetaminophen 5-325 mg per tablet; Take 1 tablet by mouth every 8 (eight) hours as needed for pain.  Dispense: " 10 tablet; Refill: 0    Chronic obstructive pulmonary disease, unspecified COPD type  -     albuterol (PROAIR HFA) 90 mcg/actuation inhaler; Inhale 2 puffs every 4 (four) hours as needed for wheezing.  Dispense: 2 each; Refill: 3        The following high BMI interventions were performed this visit: encouragement to exercise    Master Leyva MD  Transcription using voice recognition software, may contain typographical errors.

## 2021-06-10 NOTE — PROGRESS NOTES
OFFICE VISIT NOTE    Subjective:   Chief Complaint:  Hypertension (1 month follow up. has been ok except when she went grocery shopping, both times had tightness in chest after making multiple trips to the car and back for groceries had to use her nitro)    86-year-old woman with a history of fibromyalgia with chronic pain, ED.  She has been doing fairly well.  She occasionally gets short of breath with activity.  She is no longer smoking.  She has a history of GI bleeding but no recent melena.  No GI complaints.    Current Outpatient Prescriptions   Medication Sig     albuterol (VENTOLIN HFA) 90 mcg/actuation inhaler Inhale 2 puffs every 6 (six) hours as needed for wheezing.     aspirin 81 MG EC tablet Take 81 mg by mouth daily with breakfast.     atenolol (TENORMIN) 50 MG tablet TAKE 1 TABLET (50 MG TOTAL) BY MOUTH DAILY.     atorvastatin (LIPITOR) 10 MG tablet TAKE 1 TABLET (10 MG TOTAL) BY MOUTH DAILY.     cholecalciferol, vitamin D3, (CHOLECALCIFEROL) 1,000 unit tablet Take 1,000 Units by mouth daily.     furosemide (LASIX) 80 MG tablet Take 40 mg by mouth daily.      HYDROcodone-acetaminophen 5-325 mg per tablet Take 1 tablet by mouth every 8 (eight) hours as needed for pain.     KLOR-CON M20 20 mEq tablet TAKE 1 TABLET (20 MEQ TOTAL) BY MOUTH DAILY.     levothyroxine (SYNTHROID, LEVOTHROID) 75 MCG tablet TAKE 1 TABLET (75 MCG TOTAL) BY MOUTH DAILY.     losartan-hydrochlorothiazide (HYZAAR) 100-25 mg per tablet Take 1 tablet by mouth daily. (Patient taking differently: Take 1 tablet by mouth daily. 1 in am and half  Tab in the afternoon)     melatonin 3 mg Tab tablet Take 1 tablet (3 mg total) by mouth bedtime as needed.     multivitamin therapeutic (THERAGRAN) tablet Take 1 tablet by mouth daily.     NITROSTAT 0.4 mg SL tablet Place 0.4 mg under the tongue every 5 (five) minutes as needed for chest pain.      omeprazole (PRILOSEC) 20 MG capsule Take 1 capsule (20 mg total) by mouth 2 (two) times daily before  "lunch and supper.     peg 400-propylene glycol (SYSTANE) 0.4-0.3 % Drop Administer 1 drop to both eyes 4 (four) times a day as needed (dry eye).        PSFHx: Tobacco Status:  She  reports that she quit smoking about 42 years ago. Her smoking use included Cigarettes. She has a 7.50 pack-year smoking history. She has never used smokeless tobacco.    Review of Systems:  A comprehensive review of systems is negative except for the comments above    Objective:    Pulse (!) 57  Ht 4' 10\" (1.473 m)  Wt 147 lb (66.7 kg)  SpO2 100%  BMI 30.72 kg/m2  GENERAL: No acute distress.  Weight is the same.  Blood pressure 138/76.  Pulse 64.  Oxygen saturation 98%.  Trace edema of the feet.  Stasis dermatitis changes in the lower feet.  Lungs are clear.  No wheezing is heard today.  Heart shows a sinus rhythm without significant murmur.  No JVD.  No cyanosis.  No jaundice.  She has diffuse muscle tenderness in the back of the neck shoulders arms etc. all compatible with her fibromyalgia diagnosis.  Memory is normal cranial nerves normal gait is normal.    Assessment & Plan   Tori Do is a 86 y.o. female.    Clinically she is stable.  She will continue her same medicines.  I will see her again in August.  We will check a hemoglobin and basic metabolic profile at that time.  We will also schedule a bone density in August.    Diagnoses and all orders for this visit:    Essential hypertension with goal blood pressure less than 140/90    Anemia    Chronic obstructive pulmonary disease, unspecified COPD type        The following high BMI interventions were performed this visit: encouragement to exercise    Master Leyva MD  Transcription using voice recognition software, may contain typographical errors.    "

## 2021-06-11 NOTE — TELEPHONE ENCOUNTER
Prescription Monitoring Program activity reviewed with no discrepancies noted.      Last fill per : 8/21/20  Quantity/days supply: 60 tablets for 30 days    Controlled Substance Agreement on file: No  Date: NA    Last office visit with provider:  9/14/20    Please advise.

## 2021-06-11 NOTE — TELEPHONE ENCOUNTER
Spoke with pt today regarding the new FLX- LAAC device availability.  She would like to consider it and call us back after she speaks with her son and daughter.  Of note, she fell in Dec of this past year and ended up having a sign. Subdural hematoma.

## 2021-06-11 NOTE — TELEPHONE ENCOUNTER
Controlled Substance Refill Request  Medication Name:   Requested Prescriptions     Pending Prescriptions Disp Refills     traMADoL (ULTRAM) 50 mg tablet [Pharmacy Med Name: TRAMADOL HCL TAB 50MG TABLET] 60 tablet 0     Sig: TAKE ONE TABLET (50 MG) BY MOUTH 2 TIMES A DAY AS NEEDED FOR PAIN.     Date Last Fill: 8/21/20  Requested Pharmacy: [facility]  Submit electronically to pharmacy  Controlled Substance Agreement on file:   Encounter-Level CSA Scan Date:    There are no encounter-level csa scan date.        Last office visit:  9/14/20

## 2021-06-11 NOTE — TELEPHONE ENCOUNTER
RN cannot approve Refill Request    RN can NOT refill this medication med is not covered by policy/route to provider. Last office visit: 9/14/2020 Master Leyva MD Last Physical: Visit date not found Last MTM visit: Visit date not found Last visit same specialty: 9/14/2020 Master Leyva MD.  Next visit within 3 mo: Visit date not found  Next physical within 3 mo: Visit date not found      [facility] Triage/Med Refill 9/29/2020    Requested Prescriptions   Pending Prescriptions Disp Refills     nitroglycerin (NITROSTAT) 0.4 MG SL tablet [Pharmacy Med Name: NITROGLYCERN SUB 0.4MG DISSOLVE]  0     Sig: DISSOLVE 1 TABLET UNDER TONGUE EVERY 5 MINUTES AS NEEDED FOR CHEST PAIN       There is no refill protocol information for this order

## 2021-06-11 NOTE — PROGRESS NOTES
OFFICE VISIT NOTE    Subjective:   Chief Complaint:  Follow-up (knee pain)    89-year-old woman with history of significant osteoarthritis.  Also has fibromyalgia.  Also history of atrial fibrillation GI bleeding.  A great deal of difficulty with pain in the knee again.  She requests a cortisone injection.  She feels lots of pain in the clicking and points to the medial aspect of the left knee.  She is also due for a flu shot.    Current Outpatient Medications   Medication Sig     albuterol (PROAIR HFA;PROVENTIL HFA;VENTOLIN HFA) 90 mcg/actuation inhaler INHALE 2 PUFFS BY MOUTH EVERY 6 HOURS AS NEEDED FOR WHEEZING     atorvastatin (LIPITOR) 10 MG tablet TAKE ONE TABLET BY MOUTH ONCE DAILY     busPIRone (BUSPAR) 5 MG tablet TAKE TWO TABLETS (10 MG) BY MOUTH 3 TIMES DAILY     CARTIA  mg 24 hr capsule TAKE ONE CAPSULE (180 MG) BY MOUTH ONCE DAILY     celecoxib (CELEBREX) 100 MG capsule Take 1 capsule (100 mg total) by mouth daily.     cholecalciferol, vitamin D3, (CHOLECALCIFEROL) 1,000 unit tablet Take 1,000 Units by mouth daily.     DULoxetine (CYMBALTA) 30 MG capsule      furosemide (LASIX) 80 MG tablet Take 1 tablet (80 mg total) by mouth daily.     hydroCHLOROthiazide (HYDRODIURIL) 25 MG tablet TAKE ONE TABLET BY MOUTH ONCE DAILY ALONG WITH LOSARTAN 100MG TAB     levothyroxine (SYNTHROID, LEVOTHROID) 88 MCG tablet Take 1 tablet (88 mcg total) by mouth daily.     losartan (COZAAR) 100 MG tablet TAKE ONE TABLET BY MOUTH ONCE DAILY (ALONG WITH HCTZ 25MG TAB)     melatonin 3 mg Tab tablet Take 1 tablet (3 mg total) by mouth bedtime as needed.     metoprolol succinate (TOPROL-XL) 50 MG 24 hr tablet Take 2 tablets (100 mg total) by mouth at bedtime.     montelukast (SINGULAIR) 10 mg tablet Take 10 mg by mouth at bedtime as needed.      multivitamin therapeutic (THERAGRAN) tablet Take 1 tablet by mouth daily.     NITROSTAT 0.4 mg SL tablet Place 0.4 mg under the tongue every 5 (five) minutes as needed for chest  pain.      omeprazole (PRILOSEC) 20 MG capsule Take 1 capsule (20 mg total) by mouth 2 (two) times a day before meals.     peg 400-propylene glycol (SYSTANE) 0.4-0.3 % Drop Administer 1 drop to both eyes 4 (four) times a day as needed (dry eye).      potassium chloride (K-DUR,KLOR-CON) 20 MEQ tablet TAKE ONE TABLET BY MOUTH ONCE DAILY     traMADoL (ULTRAM) 50 mg tablet TAKE ONE TABLET (50 MG) BY MOUTH 2 TIMES A DAY AS NEEDED FOR PAIN.     UNABLE TO FIND Take 2 capsules by mouth daily. Med Name: Omega XL ([employer] endorsed product)     apixaban ANTICOAGULANT (ELIQUIS) 2.5 mg Tab tablet Take 1 tablet (2.5 mg total) by mouth every 12 (twelve) hours.       PSFHx: Tobacco Status:  She  reports that she quit smoking about 45 years ago. Her smoking use included cigarettes. She has a 7.50 pack-year smoking history. She has never used smokeless tobacco.    Review of Systems:  A comprehensive review of systems is negative except for the comments above    Objective:    LMP  (LMP Unknown)   GENERAL: No acute distress.  Is afebrile.  Significant osteoarthritis of both knees.  Significant osteoarthritis of the left shoulder.  Decreased range of motion of the shoulder.  Crepitus of the left knee.  Tenderness of the left knee medially.  No obvious joint effusion.  Distal pedal pulses okay.    Assessment & Plan   Tori Do is a 89 y.o. female.    To medic osteoarthritis of the left knee.    Diagnoses and all orders for this visit:    Primary osteoarthritis involving multiple joints  I prepped the skin with alcohol and Betadine.  I then injected 3 cc of 1% lidocaine and 40 mg of Kenalog into the left knee using a 27-gauge needle.  A medial approach was done without difficulty.  Bandage was applied.  If her symptoms do not improve, she will need an MRI.  Need for vaccination  Flu shot today.          The following high BMI interventions were performed this visit: encouragement to exercise    Master Leyva MD  Transcription  using voice recognition software, may contain typographical errors.

## 2021-06-12 NOTE — TELEPHONE ENCOUNTER
Upcoming Appointment Question  When is the appointment: 11/16/2020  What is your appointment for?: follow up  Who is your appointment scheduled with?: PCP only  What is your question/concern?: I have concerns about coming into the clinic with the rise of covid right now.  I would like to discuss starting gabapentin for my pain as reccommended by my pharmacist friends.  Please return my call.     Okay to leave a detailed message?: Yes

## 2021-06-12 NOTE — TELEPHONE ENCOUNTER
Controlled Substance Refill Request  Medication Name:   Requested Prescriptions     Pending Prescriptions Disp Refills     traMADoL (ULTRAM) 50 mg tablet [Pharmacy Med Name: TRAMADOL HCL 50MG TAB TABLET] 60 tablet 0     Sig: TAKE ONE TABLET (50 MG) BY MOUTH 2 TIMES A DAY AS NEEDED FOR PAIN.     Date Last Fill: 9/25/20  Requested Pharmacy: [facility]  Submit electronically to pharmacy  Controlled Substance Agreement on file:   Encounter-Level CSA Scan Date:    There are no encounter-level csa scan date.        Last office visit:  9/14/20

## 2021-06-12 NOTE — PROGRESS NOTES
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  Also has one history of paroxysmal atrial fibrillation  This problem at this time continues to be diffuse muscle aching discomfort.  Some anorexia.  She did have some black stools 3 or 4 weeks ago.  That lasted for 4 days have not recurred.  He has osteoporosis by bone density.    Current Outpatient Prescriptions   Medication Sig     albuterol (VENTOLIN HFA) 90 mcg/actuation inhaler Inhale 2 puffs every 6 (six) hours as needed for wheezing.     aspirin 81 MG EC tablet Take 81 mg by mouth daily with breakfast.     atenolol (TENORMIN) 50 MG tablet TAKE 1 TABLET (50 MG TOTAL) BY MOUTH DAILY.     atorvastatin (LIPITOR) 10 MG tablet TAKE 1 TABLET (10 MG TOTAL) BY MOUTH DAILY.     cholecalciferol, vitamin D3, (CHOLECALCIFEROL) 1,000 unit tablet Take 1,000 Units by mouth daily.     furosemide (LASIX) 80 MG tablet TAKE 1/2 TABLET (40 MG TOTAL) BY MOUTH DAILY.     HYDROcodone-acetaminophen 5-325 mg per tablet Take 1 tablet by mouth every 8 (eight) hours as needed for pain.     levothyroxine (SYNTHROID, LEVOTHROID) 75 MCG tablet TAKE 1 TABLET (75 MCG TOTAL) BY MOUTH DAILY.     losartan-hydrochlorothiazide (HYZAAR) 100-25 mg per tablet Take 1 tablet by mouth daily.     melatonin 3 mg Tab tablet Take 1 tablet (3 mg total) by mouth bedtime as needed.     multivitamin therapeutic (THERAGRAN) tablet Take 1 tablet by mouth daily.     NITROSTAT 0.4 mg SL tablet Place 0.4 mg under the tongue every 5 (five) minutes as needed for chest pain.      omeprazole (PRILOSEC) 20 MG capsule Take 1 capsule (20 mg total) by mouth 2 (two) times daily before lunch and supper.     peg 400-propylene glycol (SYSTANE)  "0.4-0.3 % Drop Administer 1 drop to both eyes 4 (four) times a day as needed (dry eye).      potassium chloride SA (KLOR-CON M20) 20 MEQ tablet TAKE 1 TABLET (20 MEQ TOTAL) BY MOUTH DAILY.     alendronate (FOSAMAX) 70 MG tablet Take 1 tablet (70 mg total) by mouth every 7 days. Take in the morning on an empty stomach with a full glass of water 30 minutes before food     DULoxetine (CYMBALTA) 30 MG capsule Take 1 capsule (30 mg total) by mouth 2 (two) times a day.       PSFHx: Tobacco Status:  She  reports that she quit smoking about 42 years ago. Her smoking use included Cigarettes. She has a 7.50 pack-year smoking history. She has never used smokeless tobacco.    Review of Systems:  A comprehensive review of systems is negative except for the comments above    Objective:    Pulse 64  Ht 4' 10\" (1.473 m)  Wt 143 lb (64.9 kg)  SpO2 98%  BMI 29.89 kg/m2  GENERAL: No acute distress.  She is in no distress.  Blood pressure 138/80.  Pulse 65 and regular.  No edema.  Lungs are clear.  She does complain of occasional wheezing.  I do not hear wheezes today.  Heart shows a sinus rhythm.  There is a grade 2/6 systolic ejection murmur at the left sternal border.  Abdomen is nontender.  She does not appear pale.  No active synovitis of her joints.  Diffuse muscle tenderness.  All consistent with fibromyalgia.    Assessment & Plan   Tori Do is a 86 y.o. female.    Osteoporosis.  Start Fosamax.  Continue calcium vitamin D  Start Cymbalta 30 mg twice daily see if her fibromyalgia pain improves.  As needed use of Vicodin.  She is only used 7 tablets in the past 4 months.  She knows she has to uses infrequently.  I will see her again in 2 months.    Diagnoses and all orders for this visit:    Osteoporosis  -     alendronate (FOSAMAX) 70 MG tablet; Take 1 tablet (70 mg total) by mouth every 7 days. Take in the morning on an empty stomach with a full glass of water 30 minutes before food  Dispense: 4 tablet; Refill: " 11    Fibromyalgia  -     DULoxetine (CYMBALTA) 30 MG capsule; Take 1 capsule (30 mg total) by mouth 2 (two) times a day.  Dispense: 60 capsule; Refill: 2  -     HYDROcodone-acetaminophen 5-325 mg per tablet; Take 1 tablet by mouth every 8 (eight) hours as needed for pain.  Dispense: 15 tablet; Refill: 0    AV malformation of GI tract  -     Hemoglobin    ASHD (arteriosclerotic heart disease)  -     Lipid Cascade; Future        The following high BMI interventions were performed this visit: encouragement to exercise    Master Leyva MD  Transcription using voice recognition software, may contain typographical errors.     5

## 2021-06-12 NOTE — TELEPHONE ENCOUNTER
Okay to wait for Dr. Leyva.  Libra RICHARDS, CMA/CMT....................10:25 AM        Dr. Leyva,  Please review message below and advise if you would still like to see the patient in clinic, or if a virtual or phone visit would be okay.  Thank you.  Libra RICHARDS, CMA/CMT....................10:25 AM

## 2021-06-13 NOTE — TELEPHONE ENCOUNTER
Controlled Substance Refill Request  Medication Name:   Requested Prescriptions     Pending Prescriptions Disp Refills     traMADoL (ULTRAM) 50 mg tablet [Pharmacy Med Name: TRAMADOL HCL 50MG TAB TABLET] 30 tablet 0     Sig: TAKE 1 TABLET (50 MG TOTAL) BY MOUTH DAILY.  -- NEW DIRECTIONS --     /Date Last Fill: 11  Requested Pharmacy: [facility]  Submit electronically to pharmacy  Controlled Substance Agreement on file:   Encounter-Level CSA Scan Date:    There are no encounter-level csa scan date.        Last office visit:  11/11/20  Lizzie Alejandra RN, MA  [facility]    Triage Nurse Advisor

## 2021-06-13 NOTE — TELEPHONE ENCOUNTER
Spoke with the patient and relayed message below from Dr. Leyva.  She verbalized understanding and had no further questions at this time.  Libra RICHARDS, AILYN/CMT....................11:04 AM

## 2021-06-13 NOTE — TELEPHONE ENCOUNTER
Controlled Substance Refill Request  Medication Name:   Requested Prescriptions     Pending Prescriptions Disp Refills     traMADoL (ULTRAM) 50 mg tablet [Pharmacy Med Name: TRAMADOL HCL 50MG TAB TABLET] 60 tablet 0     Sig: TAKE ONE TABLET (50 MG) BY MOUTH 2 TIMES A DAY AS NEEDED FOR PAIN.     celecoxib (CELEBREX) 100 MG capsule [Pharmacy Med Name: CELECOXIB    CAP 100MG CAPSULE] 30 capsule 2     Sig: TAKE 1 CAPSULE (100 MG TOTAL) BY MOUTH DAILY.     Date Last Fill: 10/21/20  Requested Pharmacy: [facility]  Submit electronically to pharmacy  Controlled Substance Agreement on file:   Encounter-Level CSA Scan Date:    There are no encounter-level csa scan date.        Last office visit:  11/11/10      Provider Refill Request  Medication:  celecoxib  RN can NOT refill this medication per RN refill protocol because med is not covered by policy/route to provider

## 2021-06-13 NOTE — TELEPHONE ENCOUNTER
Prescription has been set up for Dr. Leyva to review per message below.  Spoke with the patient and relayed message below from Dr. Leyva.  She verbalized understanding and had no further questions at this time.  Libra RICHARDS CMA/CMT....................4:21 PM

## 2021-06-13 NOTE — TELEPHONE ENCOUNTER
Prescription Monitoring Program activity reviewed with no discrepancies noted.      Last fill per : 11/17/20  Quantity/days supply: 30 tablets for 30 days     Controlled Substance Agreement on file: No  Date: NA    Last office visit with provider:  9/14/2020 Master Leyva MD    Please advise.

## 2021-06-13 NOTE — PROGRESS NOTES
OFFICE VISIT NOTE    Subjective:   Chief Complaint:  Follow-up    86-year-old woman in for follow-up regarding fibromyalgia, COPD, ASHD, chronic GI bleeding from AV malformations, anemia.  She doing about the same.  Lots of chronic aches and pains related to fibromyalgia.  No increasing chest pain.  She gets short of breath in hot humid weather.    Current Outpatient Prescriptions   Medication Sig     albuterol (VENTOLIN HFA) 90 mcg/actuation inhaler Inhale 2 puffs every 6 (six) hours as needed for wheezing.     alendronate (FOSAMAX) 70 MG tablet Take 1 tablet (70 mg total) by mouth every 7 days. Take in the morning on an empty stomach with a full glass of water 30 minutes before food     aspirin 81 MG EC tablet Take 81 mg by mouth daily with breakfast.     atenolol (TENORMIN) 50 MG tablet TAKE 1 TABLET (50 MG TOTAL) BY MOUTH DAILY.     atorvastatin (LIPITOR) 10 MG tablet TAKE 1 TABLET (10 MG TOTAL) BY MOUTH DAILY.     cholecalciferol, vitamin D3, (CHOLECALCIFEROL) 1,000 unit tablet Take 1,000 Units by mouth daily.     DULoxetine (CYMBALTA) 30 MG capsule Take 1 capsule (30 mg total) by mouth 2 (two) times a day.     furosemide (LASIX) 80 MG tablet TAKE 1/2 TABLET (40 MG TOTAL) BY MOUTH DAILY.     HYDROcodone-acetaminophen 5-325 mg per tablet Take 1 tablet by mouth every 8 (eight) hours as needed for pain.     levothyroxine (SYNTHROID, LEVOTHROID) 75 MCG tablet TAKE 1 TABLET (75 MCG TOTAL) BY MOUTH DAILY.     losartan-hydrochlorothiazide (HYZAAR) 100-25 mg per tablet Take 1 tablet by mouth daily.     melatonin 3 mg Tab tablet Take 1 tablet (3 mg total) by mouth bedtime as needed.     montelukast (SINGULAIR) 10 mg tablet Take 1 tablet (10 mg total) by mouth daily.     multivitamin therapeutic (THERAGRAN) tablet Take 1 tablet by mouth daily.     NITROSTAT 0.4 mg SL tablet Place 0.4 mg under the tongue every 5 (five) minutes as needed for chest pain.      omeprazole (PRILOSEC) 20 MG capsule Take 1 capsule (20 mg total)  by mouth 2 (two) times daily before lunch and supper.     peg 400-propylene glycol (SYSTANE) 0.4-0.3 % Drop Administer 1 drop to both eyes 4 (four) times a day as needed (dry eye).      potassium chloride SA (KLOR-CON M20) 20 MEQ tablet TAKE 1 TABLET (20 MEQ TOTAL) BY MOUTH DAILY.       PSFHx: Tobacco Status:  She  reports that she quit smoking about 42 years ago. Her smoking use included Cigarettes. She has a 7.50 pack-year smoking history. She has never used smokeless tobacco.    Review of Systems:  A comprehensive review of systems is negative except for the comments above    Objective:    There were no vitals taken for this visit.  GENERAL: No acute distress.  Weight is down 2 pounds.  Blood pressure 138/78.  Pulse is 60 and regular.  Oxygen saturation 98% on room air.  Trace edema of the feet.  Lungs have slightly decreased breath sounds but no wheezing today.  Heart shows a sinus rhythm.  There is a short grade 2/6 systolic ejection murmur present.  No diastolic murmur.  No JVD.  Kyphoscoliosis of the spine.  She has lost height over the years.  Mentally sharp and alert answers questions appropriately.  Some osteoarthritic changes of her hands and fingers.  Handgrip is excellent.    Assessment & Plan   Tori Do is a 86 y.o. female.    Clinically she is fairly stable.  We will give her a flu shot today.  Basic metabolic profile.  For times of increasing dyspnea, she should use Singulair 10 mg p.o. daily.  Otherwise use albuterol as needed.  Return to clinic late December.    Diagnoses and all orders for this visit:    Chronic obstructive pulmonary disease, unspecified COPD type  -     montelukast (SINGULAIR) 10 mg tablet; Take 1 tablet (10 mg total) by mouth daily.  Dispense: 30 tablet; Refill: 2    Fibromyalgia    Iron deficiency anemia due to chronic blood loss    Anemia  -     HM2(CBC w/o Differential)    Coronary atherosclerosis    Need for vaccination  -     Influenza High Dose, Seasonal 65+  yrs    Medication management  -     Basic Metabolic Panel        The following high BMI interventions were performed this visit: encouragement to exercise    Master Leyva MD  Transcription using voice recognition software, may contain typographical errors.

## 2021-06-13 NOTE — TELEPHONE ENCOUNTER
Spoke with Merline at the pharmacy and relayed message below from Dr. Levya.  She verbalized understanding and had no further questions at this time.  Libra RICHARDS CMA/CORTEZ....................12:17 PM

## 2021-06-13 NOTE — TELEPHONE ENCOUNTER
Dosage for the gabapentin should read 300 mg capsules, 2 p.o. twice daily.  Dispense 125 with 2 refills.  Should go to her local pharmacist.

## 2021-06-13 NOTE — TELEPHONE ENCOUNTER
Refill Approved    Rx renewed per Medication Renewal Policy. Medication was last renewed on 10/29/19.    Valencia Velsaco, [facility] Triage/Med Refill 11/17/2020     Requested Prescriptions   Pending Prescriptions Disp Refills     potassium chloride (K-DUR,KLOR-CON) 20 MEQ tablet [Pharmacy Med Name: POT CL MICRO TAB 20MEQ ER TABLET] 90 tablet 3     Sig: TAKE ONE TABLET BY MOUTH ONCE DAILY       Potassium Supplements Refill Protocol Passed - 11/16/2020 11:26 AM        Passed - PCP or prescribing provider visit in past 12 months       Last office visit with prescriber/PCP: 2/4/2019 Ran Yin MD OR same dept: 9/14/2020 Master Leyva MD OR same specialty: 9/14/2020 Master Leyva MD  Last physical: 3/26/2019 Last MTM visit: Visit date not found   Next visit within 3 mo: Visit date not found  Next physical within 3 mo: Visit date not found  Prescriber OR PCP: Ran Yin MD  Last diagnosis associated with med order: 1. Potassium (K) deficiency  - potassium chloride (K-DUR,KLOR-CON) 20 MEQ tablet [Pharmacy Med Name: POT CL MICRO TAB 20MEQ ER TABLET]; TAKE ONE TABLET BY MOUTH ONCE DAILY  Dispense: 90 tablet; Refill: 3    If protocol passes may refill for 12 months if within 3 months of last provider visit (or a total of 15 months).             Passed - Potassium level in last 12 months     Lab Results   Component Value Date    Potassium 4.3 12/27/2019    Potassium 4.3 12/27/2019

## 2021-06-13 NOTE — TELEPHONE ENCOUNTER
"Dr. Leyva,  Please review the following message and send prescription as requested.  \"[address] pharmacy needs new RX for Neurontin dose changes ordered 11/25/2020  Routing to [facility].\"  Thank you.  Libra RICHARDS CMA/CORTEZ....................2:27 PM    "

## 2021-06-13 NOTE — TELEPHONE ENCOUNTER
Patient contacted to reschedule appointment on 12/7/20 and she expressed that she is having increased pain from the fibromyalgia.    She is wanting to increase her pain medication.    Please advise.    Thank you.    Anushka SIN LPN .......... 10:22 AM  11/25/20  [facility]

## 2021-06-13 NOTE — PROGRESS NOTES
"Tori Do is a 89 y.o. female who is being evaluated via a billable telephone visit.      The patient has been notified of following:     \"This telephone visit will be conducted via a call between you and your physician/provider. We have found that certain health care needs can be provided without the need for a physical exam.  This service lets us provide the care you need with a short phone conversation.  If a prescription is necessary we can send it directly to your pharmacy.  If lab work is needed we can place an order for that and you can then stop by our lab to have the test done at a later time.    Telephone visits are billed at different rates depending on your insurance coverage. During this emergency period, for some insurers they may be billed the same as an in-person visit.  Please reach out to your insurance provider with any questions.    If during the course of the call the physician/provider feels a telephone visit is not appropriate, you will not be charged for this service.\"     Patient has given verbal consent to a Telephone visit? Yes    What phone number would you like to be contacted at? [phone]    Patient would like to receive their AVS by AVS Preference: Mail a copy.    Additional provider notes: Very pleasant patient with Dr. Wynne.  She has a complex history.  Has a history of chronic pain, atrial fibrillation on anticoagulants.  History of GI bleed hypothyroidism diastolic congestive heart failure.  She has ongoing multiple joint pain.  Combination of osteoarthritis and fibromyalgia.  Recently her  gabapentin 300 mg daily for 7 days then increase to 300 twice daily.  She was tolerating it well despite her history of bleeding Celebrex helps so much that it was continued  Overall she lives independently in her own home  Is  , drives,   Walking is limited because she gets short of breath.  Last echo in 2019    Limited 2D echocardiogram to evaluate pericardial " effusion    Atrial fibrillation with increased ventricular response    Normal left ventricular size.    Left ventricle ejection fraction is normal. The calculated left ventricular ejection fraction is 70%.    Normal right ventricular size and systolic function.    Valve structures not fully evaluated on this limited study.    Small pericardial effusion overlying the right ventricle.    When compared to the previous study dated 4/4/2019, prior examination was a transesophageal echocardiogram during procedure. Small pericardial effusion is identified over the right ventricle on this examination.   health maintenance last DEXA in 2017 showed osteoporosis she does not seem to be aware of that diagnosis is willing to try Fosamax.  She is past the age of which mom on colon cancer screening.  She is fully immunized  She denies trouble sleeping she says she is happy with her pain control.  Bowel motions are fine she is not medically depressed or anxious has support from her daughter  Assessment/Plan:  1. Essential hypertension      2. Chronic atrial fibrillation (H)  Rate is slightly increased but otherwise controlled controlledHas been controlled    3. Atherosclerosis of native coronary artery of native heart without angina pectoris  No active disease    4. Hypothyroidism, unspecified type  Repeated testing has been normal  5. Fibromyalgia  On duloxetine and gabapentin with fair control    6. Primary osteoarthritis involving multiple joints  On Celebrex no longer taking tramadol    7. Anxiety  Doing well on BuSpar    8. Malignant neoplasm of ovary, unspecified laterality (H)  She is an ovarian cancer survivor.  Has had no relapse  9. Mixed hyperlipidemia  Tolerating her cholesterol pill    10. Iron deficiency anemia due to chronic blood loss  Is due for labs we will check a follow-up visit in March 11 she has osteoporosis from her last DEXA scan in 2017 she was not aware of that but is willing to take Fosamax we will  send in a prescription    Phone call duration: 18 minutes    BRIA MONTEZ    Yes

## 2021-06-13 NOTE — TELEPHONE ENCOUNTER
We could certainly do a video or telephone call that day rather than have her come into the office.

## 2021-06-13 NOTE — TELEPHONE ENCOUNTER
[address] pharmacy needs new RX for Neurontin dose changes ordered 11/25/2020  Routing to [facility]  Valencia Meehan RN  [place] Nurse Advisor    Reason for Disposition    Caller has NON-URGENT medication question about med that PCP prescribed and triager unable to answer question    Additional Information    Negative: Drug overdose and triager unable to answer question    Negative: Caller requesting information unrelated to medicine    Negative: Caller requesting a prescription for Strep throat and has a positive culture result    Negative: Rash while taking a medication or within 3 days of stopping it    Negative: Immunization reaction suspected    Negative: Asthma and having symptoms of asthma (cough, wheezing, etc.)    Negative: Breastfeeding questions about mother's medicines and diet    Negative: MORE THAN A DOUBLE DOSE of a prescription or over-the-counter (OTC) drug    Negative: DOUBLE DOSE (an extra dose or lesser amount) of over-the-counter (OTC) drug and any symptoms (e.g., dizziness, nausea, pain, sleepiness)    Negative: DOUBLE DOSE (an extra dose or lesser amount) of prescription drug and any symptoms (e.g., dizziness, nausea, pain, sleepiness)    Negative: Took another person's prescription drug    Negative: DOUBLE DOSE (an extra dose or lesser amount) of prescription drug and NO symptoms (Exception: a double dose of antibiotics)    Negative: Diabetes drug error or overdose (e.g., took wrong type of insulin or took extra dose)    Negative: Caller has medication question about med not prescribed by PCP and triager unable to answer question (e.g., compatibility with other med, storage)    Negative: Prescription not at pharmacy and was prescribed today by PCP    Negative: Request for URGENT new prescription or refill of 'essential' medication (i.e., likelihood of harm to patient if not taken) and triager unable to fill per department policy    Negative: Pharmacy calling with prescription questions and triager  unable to answer question    Negative: Caller has URGENT medication question about med that PCP prescribed and triager unable to answer question    Protocols used: MEDICATION QUESTION CALL-A-[place]       Confused/Alert/Drowsy

## 2021-06-13 NOTE — TELEPHONE ENCOUNTER
Spoke with the patient and relayed message below from Dr. Leyva.  She verbalized understanding and has scheduled a phone visit with  for tomorrow.  Patient had no further questions at this time.  Libra RICHARDS CMA/CMT....................2:15 PM

## 2021-06-13 NOTE — PROGRESS NOTES
"Tori Do is a 89 y.o. female who is being evaluated via a billable telephone visit.      The patient has been notified of following:     \"This telephone visit will be conducted via a call between you and your physician/provider. We have found that certain health care needs can be provided without the need for a physical exam.  This service lets us provide the care you need with a short phone conversation.  If a prescription is necessary we can send it directly to your pharmacy.  If lab work is needed we can place an order for that and you can then stop by our lab to have the test done at a later time.    Telephone visits are billed at different rates depending on your insurance coverage. During this emergency period, for some insurers they may be billed the same as an in-person visit.  Please reach out to your insurance provider with any questions.    If during the course of the call the physician/provider feels a telephone visit is not appropriate, you will not be charged for this service.\"    Patient has given verbal consent to a Telephone visit? Yes    What phone number would you like to be contacted at? [phone]    Patient would like to receive their AVS by AVS Preference: Mail a copy.    Additional provider notes: 89-year-old woman with a telephone visit today.  History of osteoarthritis, fibromyalgia with chronic pain, coronary artery disease, gastric ulcer GI bleeding.  Been having a lot of difficulty with pains in the joints.  She did complains of knee pain shoulder pain back discomfort foot pain etc.  Some of its muscle pain related to fibromyalgia.  Is her awake at night.  Denies any recent melena or bright red rectal bleeding.  Some mild asthma flares.  No chest pain to report.      Assessment/Plan:  Multiple joint pain.  Combination of osteoarthritis and fibromyalgia.  Going to add gabapentin 300 mg daily for 7 days then increase to 300 twice daily.  Other medications she should continue.  Avoid " increased use of NSAIDs which might cause recurrent GI bleeding from gastric ulceration.      Phone call duration:  12 minutes    Michelle Lopez CMA

## 2021-06-13 NOTE — TELEPHONE ENCOUNTER
Medication Question or Clarification  Who is calling: Merline cerda/[address] [facility]  What medication are you calling about (include dose and sig)?:  Tramadol (ultram) tablet 50 mg    Sig: (most recent) take 1 tablet by mouth daily; starting 11/17/20 until 12/17/20   Who prescribed the medication?: Dr. Leyva  What is your question/concern?: Merline from the pharmacy was calling to verify quantity and instructions for most recent Tramadol prescription.    States they received a refill for this medication with a quantity of 60 but the instructions now state, take 1 tablet by mouth daily.     Pharmacy is wondering if provider does want the quantity of 60 for 60 or should it be changed to quantity of 30 for 30 and if the new decreased instructions are correct   Requested Pharmacy: [address]   Okay to leave a detailed message?: Yes

## 2021-06-13 NOTE — TELEPHONE ENCOUNTER
Tramadol should be discontinued.  She should increase the gabapentin by 1 capsule daily for 5 days then add a second extra dosage daily.

## 2021-06-14 NOTE — TELEPHONE ENCOUNTER
Controlled Substance Refill Request  Medication Name:   Requested Prescriptions     Pending Prescriptions Disp Refills     busPIRone (BUSPAR) 5 MG tablet [Pharmacy Med Name: BUSPIRONE TAB 5 MG TABLET] 540 tablet 3     Sig: TAKE TWO TABLETS (10 MG) BY MOUTH 3 TIMES DAILY     traMADoL (ULTRAM) 50 mg tablet [Pharmacy Med Name: TRAMADOL HCL 50MG TAB TABLET] 30 tablet 0     Sig: TAKE 1 TABLET (50 MG TOTAL) BY MOUTH DAILY.  -- NEW DIRECTIONS --     Date Last Fill: not on med list  Requested Pharmacy: [facility]  Submit electronically to pharmacy  Controlled Substance Agreement on file:   Encounter-Level CSA Scan Date:    There are no encounter-level csa scan date.        Last office visit:  12/18/20      Rx renewed per Medication Renewal policy  buspirone

## 2021-06-14 NOTE — TELEPHONE ENCOUNTER
Refill Approved    Rx renewed per Medication Renewal Policy. Medication was last renewed on 9/19/19, last OV 12/18/20.    Lizzie Alejandra, [facility] Triage/Med Refill 1/16/2021     Requested Prescriptions   Pending Prescriptions Disp Refills     omeprazole (PRILOSEC) 20 MG capsule [Pharmacy Med Name: OMEPRAZOLE 20MG CAP CAPSULE] 180 capsule 3     Sig: TAKE ONE CAPSULE (20MG) BY MOUTH TWICE A DAY BEFORE MEALS.       GI Medications Refill Protocol Passed - 1/15/2021  4:41 PM        Passed - PCP or prescribing provider visit in last 12 or next 3 months.     Last office visit with prescriber/PCP: 9/14/2020 Master Leyva MD OR same dept: 9/14/2020 Master Leyva MD OR same specialty: 9/14/2020 Master Leyva MD  Last physical: Visit date not found Last MTM visit: Visit date not found   Next visit within 3 mo: Visit date not found  Next physical within 3 mo: Visit date not found  Prescriber OR PCP: Master Leyva MD  Last diagnosis associated with med order: 1. Iron deficiency anemia due to chronic blood loss  - omeprazole (PRILOSEC) 20 MG capsule [Pharmacy Med Name: OMEPRAZOLE 20MG CAP CAPSULE]; TAKE ONE CAPSULE (20MG) BY MOUTH TWICE A DAY BEFORE MEALS.  Dispense: 180 capsule; Refill: 3    If protocol passes may refill for 12 months if within 3 months of last provider visit (or a total of 15 months).

## 2021-06-14 NOTE — TELEPHONE ENCOUNTER
RN cannot approve Refill Request    RN can NOT refill this medication Protocol failed and NO refill given. Last office visit: Visit date not found Last Physical: Visit date not found Last MTM visit: Visit date not found Last visit same specialty: 9/14/2020 Master Leyva MD.  Next visit within 3 mo: Visit date not found  Next physical within 3 mo: Visit date not found      Valencia MAHER Rod, [facility] Triage/Med Refill 1/15/2021    Requested Prescriptions   Pending Prescriptions Disp Refills     losartan (COZAAR) 100 MG tablet [Pharmacy Med Name: LOSARTAN POT TAB 100MG TABLET] 90 tablet 3     Sig: TAKE ONE TABLET BY MOUTH ONCE DAILY       Angiotensin Receptor Blocker Protocol Failed - 1/14/2021  5:01 PM        Failed - Serum potassium within the past 12 months     No results found for: LN-POTASSIUM          Failed - Serum creatinine within the past 12 months     Creatinine   Date Value Ref Range Status   12/27/2019 1.25 (H) 0.60 - 1.10 mg/dL Final   12/27/2019 1.21 (H) 0.60 - 1.10 mg/dL Final             Passed - PCP or prescribing provider visit in past 12 months       Last office visit with prescriber/PCP: Visit date not found OR same dept: 9/14/2020 Master Leyva MD OR same specialty: 9/14/2020 Master Leyva MD  Last physical: Visit date not found Last MTM visit: Visit date not found   Next visit within 3 mo: Visit date not found  Next physical within 3 mo: Visit date not found  Prescriber OR PCP: Lobito Sousa MD  Last diagnosis associated with med order: 1. Essential hypertension  - losartan (COZAAR) 100 MG tablet [Pharmacy Med Name: LOSARTAN POT TAB 100MG TABLET]; TAKE ONE TABLET BY MOUTH ONCE DAILY  Dispense: 90 tablet; Refill: 3    If protocol passes may refill for 12 months if within 3 months of last provider visit (or a total of 15 months).             Passed - Blood pressure filed in past 12 months     BP Readings from Last 1 Encounters:   09/15/20 138/84

## 2021-06-14 NOTE — TELEPHONE ENCOUNTER
PCP's recent video visit note from 12/18/20 states that pt is no longer taking Tramadol, is on Celebrex.      Spoke with pt, who stated to disregard the Tramadol refill request, pt confirms she's no longer taking Tramadol.

## 2021-06-15 NOTE — PROGRESS NOTES
OFFICE VISIT NOTE    Subjective:   Chief Complaint:  Follow-up (3 months)    86-year-old woman in for follow-up regarding COPD, ASHD, fibromyalgia.  Also has history of chronic GI bleeding with anemia but she told me she has not had any blood loss the past year.  She complains of diffuse muscle aching related no fevers no chills no increasing chest pain.  Does get short of breath with minimal activity.    Current Outpatient Prescriptions   Medication Sig     alendronate (FOSAMAX) 70 MG tablet Take 1 tablet (70 mg total) by mouth every 7 days. Take in the morning on an empty stomach with a full glass of water 30 minutes before food     aspirin 81 MG EC tablet Take 81 mg by mouth daily with breakfast.     atorvastatin (LIPITOR) 10 MG tablet TAKE 1 TABLET (10 MG TOTAL) BY MOUTH DAILY.     cholecalciferol, vitamin D3, (CHOLECALCIFEROL) 1,000 unit tablet Take 1,000 Units by mouth daily.     DULoxetine (CYMBALTA) 30 MG capsule Take 1 capsule (30 mg total) by mouth 2 (two) times a day.     furosemide (LASIX) 80 MG tablet TAKE 1/2 TABLET (40 MG TOTAL) BY MOUTH DAILY.     HYDROcodone-acetaminophen 5-325 mg per tablet Take 1 tablet by mouth 2 (two) times a day as needed for pain.     levothyroxine (SYNTHROID, LEVOTHROID) 75 MCG tablet TAKE 1 TABLET (75 MCG TOTAL) BY MOUTH DAILY.     losartan-hydrochlorothiazide (HYZAAR) 100-25 mg per tablet Take 1 tablet by mouth daily.     melatonin 3 mg Tab tablet Take 1 tablet (3 mg total) by mouth bedtime as needed.     metoprolol succinate (TOPROL-XL) 50 MG 24 hr tablet      montelukast (SINGULAIR) 10 mg tablet Take 1 tablet (10 mg total) by mouth daily.     multivitamin therapeutic (THERAGRAN) tablet Take 1 tablet by mouth daily.     NITROSTAT 0.4 mg SL tablet Place 0.4 mg under the tongue every 5 (five) minutes as needed for chest pain.      omeprazole (PRILOSEC) 20 MG capsule Take 1 capsule (20 mg total) by mouth 2 (two) times daily before lunch and supper.     peg 400-propylene  "glycol (SYSTANE) 0.4-0.3 % Drop Administer 1 drop to both eyes 4 (four) times a day as needed (dry eye).      potassium chloride SA (KLOR-CON M20) 20 MEQ tablet TAKE 1 TABLET (20 MEQ TOTAL) BY MOUTH DAILY.     VENTOLIN HFA 90 mcg/actuation inhaler INHALE 2 PUFFS BY MOUTH EVERY 6 HOURS AS NEEDED FOR WHEEZING       PSFHx: Tobacco Status:  She  reports that she quit smoking about 43 years ago. Her smoking use included Cigarettes. She has a 7.50 pack-year smoking history. She has never used smokeless tobacco.    Review of Systems:  A comprehensive review of systems is negative except for the comments above    Objective:    Pulse 78  Ht 4' 10\" (1.473 m)  Wt 147 lb (66.7 kg)  SpO2 97%  BMI 30.72 kg/m2  GENERAL: No acute distress.  Weight is up 6 pounds.  Blood pressure 144/66.  Pulse 80 and regular.  No significant edema.  No JVD.  Lungs are free of any rales or wheezes.  Heart today shows a sinus rhythm without any ectopic beats.  There is a grade 2/6 systolic ejection murmur consistent with aortic stenosis which is mild.  The abdomen is obese without tenderness.  Hands show some osteoarthritic changes.  No acute synovitis.    Assessment & Plan   Tori Do is a 86 y.o. female.    Medically she seems to be stable.  Her aches and pains are probably related fibromyalgia.  Suggested Tylenol as well as a heating pad.  She has Vicodin for very severe arthritic pain.  Blood is checked today.  I will see her again in 3 months.    Diagnoses and all orders for this visit:    Hypertension  -     Basic Metabolic Panel    Coronary atherosclerosis    Iron deficiency anemia due to chronic blood loss  -     HM2(CBC w/o Differential)    Fibromyalgia    Hypothyroidism due to acquired atrophy of thyroid  -     Thyroid Stimulating Hormone (TSH)        The following high BMI interventions were performed this visit: encouragement to exercise    Master Leyva MD  Transcription using voice recognition software, may contain " typographical errors.

## 2021-06-15 NOTE — TELEPHONE ENCOUNTER
Reason for Call:  Other call back      Detailed comments: DAUGHTER CALLING TO LET US KNOW THAT PATIENT PASSED AWAY THIS A.M. AT 12:30 A.M. ON 02/15/2021    Phone Number Patient can be reached at: Home number on file [phone] (home)    Best Time: N/A    Can we leave a detailed message on this number?: Yes    Call taken on 2/15/2021 at 9:33 AM by Estrella Rodríguez

## 2021-06-16 NOTE — TELEPHONE ENCOUNTER
Telephone Encounter by Michelle Hilario CMA at 1/22/2019 10:23 AM     Author: Michelle Hilario CMA Service: -- Author Type: Certified Medical Assistant    Filed: 1/22/2019 10:28 AM Encounter Date: 1/22/2019 Status: Signed    : Michelle Hilario CMA (Certified Medical Assistant)       Medication Question or Clarification  Who is calling: Pharmacy: [address]  What medication are you calling about?:  Metoprolol   What dose do you take :  50 mg -100 mg total  How often are you taking the medication ?: 3 times daily - 150 mg total  Who prescribed the medication?: Master Leyva MD  What is your question/concern?:  Dose increased from 100 mg to 150 mg daily - Please send new RX asap , 90 day supply & refills.   Pharmacy:  [address] [facility]   Okay to leave a detailed message?: Yes  Site CMT - Please call the pharmacy to obtain any additional needed information.      Dorene Townsend LPN          1/11/19 8:55 AM   Note      ----- Message from Monet Cardoso CNP sent at 1/10/2019  3:43 PM CST -----  Monitor shows elevated heart rates.  Increase metoprolol to 150 mg daily.  Will review with Bryan when she returns.         Additional Documentation

## 2021-06-17 NOTE — PROGRESS NOTES
OFFICE VISIT NOTE    Subjective:   Chief Complaint:  Follow-up (3 months)    87-year-old with multiple problems including fibromyalgia, GI bleeding, anemia, hypertension, hyperlipidemia, paroxysmal atrial fibrillation and COPD.  She continues a lot to aches and pains from arthritis and fibromyalgia.  Her right knee is hurting her badly today.  No falls or injuries.  No fevers or chills.  No increasing dyspnea.  No recent blood loss.    Current Outpatient Prescriptions   Medication Sig     alendronate (FOSAMAX) 70 MG tablet Take 1 tablet (70 mg total) by mouth every 7 days. Take in the morning on an empty stomach with a full glass of water 30 minutes before food     aspirin 81 MG EC tablet Take 81 mg by mouth daily with breakfast.     atorvastatin (LIPITOR) 10 MG tablet TAKE ONE TABLET BY MOUTH ONCE DAILY     cholecalciferol, vitamin D3, (CHOLECALCIFEROL) 1,000 unit tablet Take 1,000 Units by mouth daily.     DULoxetine (CYMBALTA) 30 MG capsule Take 1 capsule (30 mg total) by mouth 2 (two) times a day.     furosemide (LASIX) 80 MG tablet TAKE 1/2 TABLET (40 MG TOTAL) BY MOUTH DAILY.     HYDROcodone-acetaminophen 5-325 mg per tablet Take 1 tablet by mouth 2 (two) times a day as needed for pain.     levothyroxine (SYNTHROID) 88 MCG tablet Take 1 tablet (88 mcg total) by mouth daily.     losartan-hydrochlorothiazide (HYZAAR) 100-25 mg per tablet Take 1 tablet by mouth daily.     melatonin 3 mg Tab tablet Take 1 tablet (3 mg total) by mouth bedtime as needed.     metoprolol succinate (TOPROL-XL) 50 MG 24 hr tablet Take 1 tab daily     montelukast (SINGULAIR) 10 mg tablet TAKE ONE TABLET BY MOUTH ONCE DAILY     multivitamin therapeutic (THERAGRAN) tablet Take 1 tablet by mouth daily.     NITROSTAT 0.4 mg SL tablet Place 0.4 mg under the tongue every 5 (five) minutes as needed for chest pain.      omeprazole (PRILOSEC) 20 MG capsule TAKE ONE CAPSULE BY MOUTH TWICE A DAY     peg 400-propylene glycol (SYSTANE) 0.4-0.3 % Drop  "Administer 1 drop to both eyes 4 (four) times a day as needed (dry eye).      potassium chloride SA (K-DUR,KLOR-CON) 20 MEQ tablet TAKE ONE TABLET BY MOUTH ONCE DAILY     VENTOLIN HFA 90 mcg/actuation inhaler INHALE 2 PUFFS BY MOUTH EVERY 6 HOURS AS NEEDED FOR WHEEZING       PSFHx: Tobacco Status:  She  reports that she quit smoking about 43 years ago. Her smoking use included Cigarettes. She has a 7.50 pack-year smoking history. She has never used smokeless tobacco.    Review of Systems:  A comprehensive review of systems is negative except for the comments above    Objective:    Pulse 60  Ht 4' 10\" (1.473 m)  Wt 146 lb (66.2 kg)  SpO2 90%  BMI 30.51 kg/m2  GENERAL: No acute distress.  Vital signs are stable blood pressure 1/26/1968 pulse 60 oxygen 96% she is afebrile.  No significant JVD.  Trace edema of the legs.  Nontender abdomen.  Significant osteoarthritis of both knees.  Lungs seem clear.  Heart does show sinus rhythm.  There is a grade 2/6 systolic ejection murmur.    Assessment & Plan   Tori Do is a 87 y.o. female.    Symptomatic arthritis of the right knee.  We went ahead with a cortisone shot.  2 cc of Xylocaine and 40 mg of Kenalog 40 was injected into the right knee using a medial approach.  27-gauge needle was used.  There were no complications.  She will ice pack this today.  Check a CBC BMP and lipid profile.  Consider injection in the left knee in the future if need to discontinue troubles.    Diagnoses and all orders for this visit:    Chronic obstructive pulmonary disease, unspecified COPD type  -     HM1(CBC and Differential)  -     HM1 (CBC with Diff)    Hypertension  -     Basic Metabolic Panel    Mixed hyperlipidemia  -     Lipid Cascade    Osteoarthritis  -     triamcinolone acetonide 40 mg/mL injection 40 mg (KENALOG-40); Inject 1 mL (40 mg total) into the joint once.        The following high BMI interventions were performed this visit: encouragement to exercise    Master CHAPARRO" MD Gordon  Transcription using voice recognition software, may contain typographical errors.

## 2021-06-19 NOTE — PROGRESS NOTES
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  She also has hypothyroidism.  Her major problem continues to be diffuse muscle aching from her fibromyalgia.  No chest pains.  No recent bloody stools.  Mild anorexia but no significant weight loss.  No jaundice.    Current Outpatient Prescriptions   Medication Sig     alendronate (FOSAMAX) 70 MG tablet Take 1 tablet (70 mg total) by mouth every 7 days. Take in the morning on an empty stomach with a full glass of water 30 minutes before food     aspirin 81 MG EC tablet Take 81 mg by mouth daily with breakfast.     atorvastatin (LIPITOR) 10 MG tablet Take 1 tablet (10 mg total) by mouth daily.     cholecalciferol, vitamin D3, (CHOLECALCIFEROL) 1,000 unit tablet Take 1,000 Units by mouth daily.     DULoxetine (CYMBALTA) 30 MG capsule Take 1 capsule (30 mg total) by mouth 2 (two) times a day.     furosemide (LASIX) 80 MG tablet TAKE 1/2 TABLET (40 MG TOTAL) BY MOUTH DAILY.     HYDROcodone-acetaminophen 5-325 mg per tablet Take 1 tablet by mouth 2 (two) times a day as needed for pain.     levothyroxine (SYNTHROID) 88 MCG tablet Take 1 tablet (88 mcg total) by mouth daily.     losartan-hydrochlorothiazide (HYZAAR) 100-25 mg per tablet Take 1 tablet by mouth daily.     melatonin 3 mg Tab tablet Take 1 tablet (3 mg total) by mouth bedtime as needed.     metoprolol succinate (TOPROL-XL) 50 MG 24 hr tablet Take 1 tab daily     montelukast (SINGULAIR) 10 mg tablet TAKE ONE TABLET BY MOUTH ONCE DAILY     multivitamin therapeutic (THERAGRAN) tablet Take 1 tablet by mouth daily.     NITROSTAT 0.4 mg SL tablet Place 0.4 mg under the tongue every 5 (five) minutes as needed for chest pain.      omeprazole (PRILOSEC) 20 MG capsule TAKE ONE CAPSULE BY MOUTH TWICE A DAY     peg 400-propylene glycol  "(SYSTANE) 0.4-0.3 % Drop Administer 1 drop to both eyes 4 (four) times a day as needed (dry eye).      potassium chloride SA (K-DUR,KLOR-CON) 20 MEQ tablet TAKE ONE TABLET BY MOUTH ONCE DAILY     VENTOLIN HFA 90 mcg/actuation inhaler INHALE 2 PUFFS BY MOUTH EVERY 6 HOURS AS NEEDED FOR WHEEZING       PSFHx: Tobacco Status:  She  reports that she quit smoking about 43 years ago. Her smoking use included Cigarettes. She has a 7.50 pack-year smoking history. She has never used smokeless tobacco.    Review of Systems:  A comprehensive review of systems is negative except for the comments above    Objective:    Pulse 63  Ht 4' 10\" (1.473 m)  Wt 143 lb (64.9 kg)  SpO2 94%  BMI 29.89 kg/m2  GENERAL: No acute distress.  Weight is down 3 pounds.  Blood pressure 128/72.  Pulse is 100.  No jaundice.  Lungs seem clear.  Heart exam reveals a sinus tachycardia.  Trace edema.  Abdomen soft a little obese.  No tenderness.  No guarding.  Neurologic exam is normal.  She has significant osteoarthritis of both knees.  Left knee is more painful.    Assessment & Plan   Tori Do is a 87 y.o. female.    Because of her significant pain we will discontinue Vicodin and use tramadol 50 mg twice daily as needed.  In ahead with a cortisone injection in the left knee.  We used 2 cc of Xylocaine 1 cc of Kenalog 40.  I injected the left knee using a medial approach.  27-gauge needle was used without any complication.  The skin had been prepped with alcohol and Betadine.  I am happy with her current blood pressure.  Prescription for nitroglycerin as needed for coronary artery disease which is fairly stable.  COPD also remains stable.    Diagnoses and all orders for this visit:    Fibromyalgia    AV malformation of GI tract    Hypertension    Coronary atherosclerosis    Chronic obstructive pulmonary disease, unspecified COPD type (H)    Mixed hyperlipidemia    Osteoarthritis left knee    The following high BMI interventions were performed " this visit: encouragement to exercise    Master Leyva MD  Transcription using voice recognition software, may contain typographical errors.

## 2021-06-19 NOTE — LETTER
Letter by Master Leyva MD at      Author: Master Leyva MD Service: -- Author Type: --    Filed:  Encounter Date: 5/7/2019 Status: (Other)         Tori Do  [address]  [place] [address]             May 7, 2019         Dear Ms. Do,    Below are the results from your recent visit:    Resulted Orders   Basic Metabolic Panel   Result Value Ref Range    Sodium 146 (H) 136 - 145 mmol/L    Potassium 4.3 3.5 - 5.0 mmol/L    Chloride 101 98 - 107 mmol/L    CO2 32 (H) 22 - 31 mmol/L    Anion Gap, Calculation 13 5 - 18 mmol/L    Glucose 105 70 - 125 mg/dL    Calcium 10.5 8.5 - 10.5 mg/dL    BUN 25 8 - 28 mg/dL    Creatinine 1.15 (H) 0.60 - 1.10 mg/dL    GFR MDRD Af Amer 54 (L) >60 mL/min/1.73m2    GFR MDRD Non Af Amer 45 (L) >60 mL/min/1.73m2    Narrative    Fasting Glucose reference range is 70-99 mg/dL per  American Diabetes Association (ADA) guidelines.   Hemoglobin   Result Value Ref Range    Hemoglobin 12.0 12.0 - 16.0 g/dL       Tori, recent labs are reviewed.  Hemoglobin was up to 12.0, a very good sign.  Creatinine, was 1.15 which is a little high but improved over last visit.  Overall, the labs look better.    Please call with questions or contact us using [employer].    Sincerely,        Electronically signed by Master Leyva MD

## 2021-06-19 NOTE — LETTER
Letter by Master Leyva MD at      Author: Master Leyva MD Service: -- Author Type: --    Filed:  Encounter Date: 6/11/2019 Status: (Other)         Tori Do  [address]  [place] [address]             June 11, 2019         Dear Ms. Do,    Below are the results from your recent visit:    Resulted Orders   HM2(CBC w/o Differential)   Result Value Ref Range    WBC 6.8 4.0 - 11.0 thou/uL    RBC 3.93 3.80 - 5.40 mill/uL    Hemoglobin 12.8 12.0 - 16.0 g/dL    Hematocrit 38.4 35.0 - 47.0 %    MCV 98 80 - 100 fL    MCH 32.5 27.0 - 34.0 pg    MCHC 33.3 32.0 - 36.0 g/dL    RDW 12.3 11.0 - 14.5 %    Platelets 288 140 - 440 thou/uL    MPV 7.4 7.0 - 10.0 fL   Basic Metabolic Panel   Result Value Ref Range    Sodium 140 136 - 145 mmol/L    Potassium 3.5 3.5 - 5.0 mmol/L    Chloride 99 98 - 107 mmol/L    CO2 27 22 - 31 mmol/L    Anion Gap, Calculation 14 5 - 18 mmol/L    Glucose 119 70 - 125 mg/dL    Calcium 10.4 8.5 - 10.5 mg/dL    BUN 30 (H) 8 - 28 mg/dL    Creatinine 1.27 (H) 0.60 - 1.10 mg/dL    GFR MDRD Af Amer 48 (L) >60 mL/min/1.73m2    GFR MDRD Non Af Amer 40 (L) >60 mL/min/1.73m2    Narrative    Fasting Glucose reference range is 70-99 mg/dL per  American Diabetes Association (ADA) guidelines.       Tori, recent labs are reviewed.  Hemoglobin is come up to 12.8, a good sign.  There has been no active bleeding.  Your electrolytes are normal.  Creatinine, a measure of kidney function, is slightly elevated but  stable the past 3 months.    Please call with questions or contact us using [employer].    Sincerely,        Electronically signed by Master Leyva MD

## 2021-06-19 NOTE — LETTER
Letter by Master Leyva MD at      Author: Master Leyva MD Service: -- Author Type: --    Filed:  Encounter Date: 11/19/2019 Status: Signed         Tori Do  [address]  [place] [address]             November 19, 2019         Dear Ms. Do,    Below are the results from your recent visit:    Resulted Orders   HM2(CBC w/o Differential)   Result Value Ref Range    WBC 8.4 4.0 - 11.0 thou/uL    RBC 3.66 (L) 3.80 - 5.40 mill/uL    Hemoglobin 12.7 12.0 - 16.0 g/dL    Hematocrit 37.5 35.0 - 47.0 %     (H) 80 - 100 fL    MCH 34.7 (H) 27.0 - 34.0 pg    MCHC 33.8 32.0 - 36.0 g/dL    RDW 12.1 11.0 - 14.5 %    Platelets 286 140 - 440 thou/uL    MPV 6.8 (L) 7.0 - 10.0 fL   Basic Metabolic Panel   Result Value Ref Range    Sodium 143 136 - 145 mmol/L    Potassium 3.6 3.5 - 5.0 mmol/L    Chloride 102 98 - 107 mmol/L    CO2 27 22 - 31 mmol/L    Anion Gap, Calculation 14 5 - 18 mmol/L    Glucose 103 70 - 125 mg/dL    Calcium 9.6 8.5 - 10.5 mg/dL    BUN 37 (H) 8 - 28 mg/dL    Creatinine 1.33 (H) 0.60 - 1.10 mg/dL    GFR MDRD Af Amer 46 (L) >60 mL/min/1.73m2    GFR MDRD Non Af Amer 38 (L) >60 mL/min/1.73m2    Narrative    Fasting Glucose reference range is 70-99 mg/dL per  American Diabetes Association (ADA) guidelines.       Toir, recent labs are reviewed.  Hemoglobin was stable at 12.7.  Your electrolytes and blood sugar looked okay.  Creatinine, a measure of kidney function, was slightly high at 1.33.  That means slightly worse kidney function compared to last time.  Make sure you drink plenty of water.  Go easy on pain medications, Tylenol etc. which can make kidney function worse.  I will check it again at your next visit.    Please call with questions or contact us using MyChart.    Sincerely,        Electronically signed by Master Leyva MD

## 2021-06-19 NOTE — LETTER
Letter by Master Leyva MD at      Author: Master Leyva MD Service: -- Author Type: --    Filed:  Encounter Date: 7/16/2019 Status: (Other)         Tori Do  [address]  [place] [address]             July 16, 2019         Dear Ms. Do,    Below are the results from your recent visit:    Resulted Orders   HM2(CBC w/o Differential)   Result Value Ref Range    WBC 6.8 4.0 - 11.0 thou/uL    RBC 3.97 3.80 - 5.40 mill/uL    Hemoglobin 12.8 12.0 - 16.0 g/dL    Hematocrit 38.1 35.0 - 47.0 %    MCV 96 80 - 100 fL    MCH 32.4 27.0 - 34.0 pg    MCHC 33.7 32.0 - 36.0 g/dL    RDW 12.2 11.0 - 14.5 %    Platelets 252 140 - 440 thou/uL    MPV 6.9 (L) 7.0 - 10.0 fL   Lipid Cascade   Result Value Ref Range    Cholesterol 153 <=199 mg/dL    Triglycerides 100 <=149 mg/dL    HDL Cholesterol 71 >=50 mg/dL    LDL Calculated 62 <=129 mg/dL    Patient Fasting > 8hrs? Yes    Basic Metabolic Panel   Result Value Ref Range    Sodium 141 136 - 145 mmol/L    Potassium 3.6 3.5 - 5.0 mmol/L    Chloride 102 98 - 107 mmol/L    CO2 27 22 - 31 mmol/L    Anion Gap, Calculation 12 5 - 18 mmol/L    Glucose 112 70 - 125 mg/dL    Calcium 9.8 8.5 - 10.5 mg/dL    BUN 26 8 - 28 mg/dL    Creatinine 0.99 0.60 - 1.10 mg/dL    GFR MDRD Af Amer >60 >60 mL/min/1.73m2    GFR MDRD Non Af Amer 53 (L) >60 mL/min/1.73m2    Narrative    Fasting Glucose reference range is 70-99 mg/dL per  American Diabetes Association (ADA) guidelines.       Tori, recent labs are reviewed.  Hemoglobin was stable at 12.8.  I believe at this time you can stop taking the iron.  Lipid levels are excellent.  Your electrolytes as well as blood sugar are in a normal range.  Creatinine, which measures kidney function, is also normal.  In summary, labs look great.    Please call with questions or contact us using [employer].    Sincerely,        Electronically signed by Master Leyva MD

## 2021-06-19 NOTE — LETTER
Letter by Master Leyva MD at      Author: Master Leyva MD Service: -- Author Type: --    Filed:  Encounter Date: 4/16/2019 Status: (Other)         Tori Do  [address]  [place] [address]             April 16, 2019         Dear Ms. Do,    Below are the results from your recent visit:    Resulted Orders   Basic Metabolic Panel   Result Value Ref Range    Sodium 141 136 - 145 mmol/L    Potassium 3.8 3.5 - 5.0 mmol/L    Chloride 97 (L) 98 - 107 mmol/L    CO2 31 22 - 31 mmol/L    Anion Gap, Calculation 13 5 - 18 mmol/L    Glucose 105 70 - 125 mg/dL    Calcium 9.8 8.5 - 10.5 mg/dL    BUN 28 8 - 28 mg/dL    Creatinine 1.29 (H) 0.60 - 1.10 mg/dL    GFR MDRD Af Amer 47 (L) >60 mL/min/1.73m2    GFR MDRD Non Af Amer 39 (L) >60 mL/min/1.73m2    Narrative    Fasting Glucose reference range is 70-99 mg/dL per  American Diabetes Association (ADA) guidelines.   HM2(CBC w/o Differential)   Result Value Ref Range    WBC 6.5 4.0 - 11.0 thou/uL    RBC 2.98 (L) 3.80 - 5.40 mill/uL    Hemoglobin 9.9 (L) 12.0 - 16.0 g/dL    Hematocrit 29.2 (L) 35.0 - 47.0 %    MCV 98 80 - 100 fL    MCH 33.4 27.0 - 34.0 pg    MCHC 34.1 32.0 - 36.0 g/dL    RDW 13.0 11.0 - 14.5 %    Platelets 306 140 - 440 thou/uL    MPV 6.8 (L) 7.0 - 10.0 fL       Tori, yesterday's lab results are reviewed.  Hemoglobin is up to 9.9.  I believe it was 9.3 when you left the hospital so it is coming up.  Kidney functions are also somewhat improved as your creatinine is now 1.29.  Sodium and potassium looked okay.  I am hoping your hemoglobin is up to 10.5 when I see you next time.    Please call with questions or contact us using [employer].    Sincerely,        Electronically signed by Master Leyva MD

## 2021-06-21 NOTE — PROGRESS NOTES
OFFICE VISIT NOTE    Subjective:   Chief Complaint:  Hypertension; Anemia; and Follow-up (2 wk follow up)    87-year-old woman in for several problems including fibromyalgia with chronic pain, COPD, history of GI bleed from gastric ulcer.  She is also weak and having issues with some anxiety.  Buspirone 5 mg 3 times a day helps a little bit.  Zanaflex caused her to feel weak so she stopped taking that.    Current Outpatient Prescriptions   Medication Sig     alendronate (FOSAMAX) 70 MG tablet TAKE ONE TABLET BY MOUTH EVERY 7 DAYS. TAKE IN AM ON EMPTY STOMACH WITH WATER 30MIN B4 FOOD     aspirin 81 MG EC tablet Take 81 mg by mouth daily with breakfast.     atorvastatin (LIPITOR) 10 MG tablet Take 1 tablet (10 mg total) by mouth daily.     busPIRone (BUSPAR) 5 MG tablet 1 tablet 3 times daily as needed for anxiety     cholecalciferol, vitamin D3, (CHOLECALCIFEROL) 1,000 unit tablet Take 1,000 Units by mouth daily.     DULoxetine (CYMBALTA) 30 MG capsule TAKE ONE CAPSULE BY MOUTH TWICE A DAY     furosemide (LASIX) 80 MG tablet TAKE ONE-HALF TAB (40MG) BY MOUTH ONCE DAILY     levothyroxine (SYNTHROID) 88 MCG tablet Take 1 tablet (88 mcg total) by mouth daily.     losartan-hydrochlorothiazide (HYZAAR) 100-25 mg per tablet TAKE ONE TABLET BY MOUTH ONCE DAILY     melatonin 3 mg Tab tablet Take 1 tablet (3 mg total) by mouth bedtime as needed.     metoprolol succinate (TOPROL-XL) 50 MG 24 hr tablet Take 1 tab daily     montelukast (SINGULAIR) 10 mg tablet TAKE ONE TABLET BY MOUTH ONCE DAILY     multivitamin therapeutic (THERAGRAN) tablet Take 1 tablet by mouth daily.     nitroglycerin (NITROSTAT) 0.4 MG SL tablet Place 1 tablet (0.4 mg total) under the tongue every 5 (five) minutes as needed for chest pain.     NITROSTAT 0.4 mg SL tablet Place 0.4 mg under the tongue every 5 (five) minutes as needed for chest pain.      omeprazole (PRILOSEC) 20 MG capsule TAKE ONE CAPSULE BY MOUTH TWICE A DAY     peg 400-propylene glycol  "(SYSTANE) 0.4-0.3 % Drop Administer 1 drop to both eyes 4 (four) times a day as needed (dry eye).      potassium chloride (K-DUR,KLOR-CON) 20 MEQ tablet Take 1 tablet (20 mEq total) by mouth daily.     VENTOLIN HFA 90 mcg/actuation inhaler INHALE 2 PUFFS BY MOUTH EVERY 6 HOURS AS NEEDED FOR WHEEZING     apixaban (ELIQUIS) 5 mg Tab tablet Take 1 tablet (5 mg total) by mouth 2 (two) times a day.     busPIRone (BUSPAR) 10 MG tablet Take 1 tablet (10 mg total) by mouth 3 (three) times a day.       PSFHx: Tobacco Status:  She  reports that she quit smoking about 43 years ago. Her smoking use included Cigarettes. She has a 7.50 pack-year smoking history. She has never used smokeless tobacco.    Review of Systems:  A comprehensive review of systems is negative except for the comments above    Objective:    Pulse (!) 112  Ht 4' 10\" (1.473 m)  Wt 144 lb 1.9 oz (65.4 kg)  LMP  (LMP Unknown)  SpO2 93%  Breastfeeding? No  BMI 30.12 kg/m2  GENERAL: No acute distress.  No distress.  Blood pressure is 132/82.  Pulse is 104.  Oxygen saturations 98%.  Lungs are clear  Heart today shows an irregular rhythms consistent with atrial fibrillation which she has had in the past.  She was actually cardioverted in 2015.  There is no significant edema.  No JVD.  No jaundice  no cyanosis,   Diffuse muscle aching.  No active synovitis.    Assessment & Plan   Tori Do is a 87 y.o. female.    Think she is back in atrial fibrillation.  We will check an EKG.  If this is the case,  increase metoprolol 50 mg twice daily.  Started on Eliquis 5 mg twice daily.  Creatinine is normal and she weighs 65 kg.  Increase buspirone to 10 mg 3 times daily.  Check hemoglobin.  If she is on blood thinners for atrial fibrillation, we will need to watch her carefully she has had GI bleeding in the past.  She continues with omeprazole.  Return to clinic in about 9-10 days. If She does not spontaneously convert need to refer her to cardiology for " cardioversion attempt    Diagnoses and all orders for this visit:    Atrial fibrillation -- S/P cardioversion 2015  -     Basic Metabolic Panel  -     HM2(CBC w/o Differential)  -     Electrocardiogram Perform and Read  -     Thyroid Cascade  -     apixaban (ELIQUIS) 5 mg Tab tablet; Take 1 tablet (5 mg total) by mouth 2 (two) times a day.  Dispense: 60 tablet; Refill: 2    Anxiety  -     busPIRone (BUSPAR) 10 MG tablet; Take 1 tablet (10 mg total) by mouth 3 (three) times a day.  Dispense: 90 tablet; Refill: 1    Fibromyalgia        The following high BMI interventions were performed this visit: encouragement to exercise    Master Leyva MD  Transcription using voice recognition software, may contain typographical errors.

## 2021-06-21 NOTE — PROGRESS NOTES
OFFICE VISIT NOTE    Subjective:   Chief Complaint:  Follow-up    87-year-old woman in for follow-up regarding paroxysmal atrial fibrillation, history of hypertension, asthma, occult GI bleeding.  Since last seen she is been about the same.  She denies any increasing dyspnea though at times that she feels a little short of breath.  No bleeding on her blood thinner Eliquis.  No increasing wheezing.    Current Outpatient Medications   Medication Sig     alendronate (FOSAMAX) 70 MG tablet TAKE ONE TABLET BY MOUTH EVERY 7 DAYS. TAKE IN AM ON EMPTY STOMACH WITH WATER 30MIN B4 FOOD     apixaban (ELIQUIS) 5 mg Tab tablet Take 1 tablet (5 mg total) by mouth 2 (two) times a day.     aspirin 81 MG EC tablet Take 81 mg by mouth daily with breakfast.     atorvastatin (LIPITOR) 10 MG tablet TAKE ONE TABLET BY MOUTH ONCE DAILY     busPIRone (BUSPAR) 10 MG tablet Take 1 tablet (10 mg total) by mouth 3 (three) times a day.     busPIRone (BUSPAR) 5 MG tablet 1 tablet 3 times daily as needed for anxiety     cholecalciferol, vitamin D3, (CHOLECALCIFEROL) 1,000 unit tablet Take 1,000 Units by mouth daily.     DULoxetine (CYMBALTA) 30 MG capsule TAKE ONE CAPSULE BY MOUTH TWICE A DAY     furosemide (LASIX) 80 MG tablet TAKE ONE-HALF TAB (40MG) BY MOUTH ONCE DAILY     levothyroxine (SYNTHROID) 88 MCG tablet Take 1 tablet (88 mcg total) by mouth daily.     losartan-hydrochlorothiazide (HYZAAR) 100-25 mg per tablet TAKE ONE TABLET BY MOUTH ONCE DAILY     melatonin 3 mg Tab tablet Take 1 tablet (3 mg total) by mouth bedtime as needed.     metoprolol succinate (TOPROL-XL) 50 MG 24 hr tablet TAKE ONE TABLET BY MOUTH ONCE DAILY     montelukast (SINGULAIR) 10 mg tablet TAKE ONE TABLET BY MOUTH ONCE DAILY     multivitamin therapeutic (THERAGRAN) tablet Take 1 tablet by mouth daily.     nitroglycerin (NITROSTAT) 0.4 MG SL tablet Place 1 tablet (0.4 mg total) under the tongue every 5 (five) minutes as needed for chest pain.     NITROSTAT 0.4 mg SL  tablet Place 0.4 mg under the tongue every 5 (five) minutes as needed for chest pain.      omeprazole (PRILOSEC) 20 MG capsule TAKE ONE CAPSULE BY MOUTH TWICE A DAY     peg 400-propylene glycol (SYSTANE) 0.4-0.3 % Drop Administer 1 drop to both eyes 4 (four) times a day as needed (dry eye).      potassium chloride (K-DUR,KLOR-CON) 20 MEQ tablet Take 1 tablet (20 mEq total) by mouth daily.     traMADol (ULTRAM) 50 mg tablet Take 1 tablet (50 mg total) by mouth every 8 (eight) hours as needed for pain.     VENTOLIN HFA 90 mcg/actuation inhaler INHALE 2 PUFFS BY MOUTH EVERY 6 HOURS AS NEEDED FOR WHEEZING       Review of Systems:  A comprehensive review of systems is negative except for the comments above    Objective:    LMP  (LMP Unknown)   GENERAL: No acute distress.  Blood pressures 124/82.  Pulse is 128.  Atrial fibrillation remains the rhythm.  Oxygen saturations 96% on room air.  No edema  Lungs a few squeaks but no rales and no expiratory wheezes.  Heart shows persistent atrial fibrillation with continued rapid ventricular rate in the upper 120s.  No ascites.  No jaundice.  No cyanosis.  Respiratory rate is 14.  Abdomen is without tenderness.  Neurologic exam remains normal.    Assessment & Plan   Tori Do is a 87 y.o. female.    Atrial fibrillation maintaining a rapid heart rate at this time.  I would increase the medication metoprolol to 50 mg twice daily.  She will continue Eliquis 5 mg twice daily.  She continues to have fibromyalgia pain but she should only use Tylenol for her discomfort.  We did check a CBC as well as a basic metabolic profile.  I will bring her back in 2 weeks to see how she is doing.  If atrial fibrillation is persisting, I will probably refer her for electrical cardioversion.  She was successfully cardioverted 3 years ago.    Diagnoses and all orders for this visit:    Fibromyalgia    Iron deficiency anemia due to chronic blood loss  -     HM2(CBC w/o Differential)    Paroxysmal  atrial fibrillation (H)  -     Basic Metabolic Panel        Master Leyva MD  Transcription using voice recognition software, may contain typographical errors.

## 2021-06-21 NOTE — PROGRESS NOTES
OFFICE VISIT NOTE    Subjective:   Chief Complaint:  Follow-up    87-year-old woman with a history of hypertension, fibromyalgia with chronic pain, and paroxysmal atrial fibrillation.  She is been atrial fibrillation again now for the past 2-3 weeks.  She is now taking metoprolol 100 mg daily along with Eliquis 5 mg twice daily.  She had one minor nosebleed only.  No other problems.  She does get short of breath with minimal activity.  No wheeze    Current Outpatient Medications   Medication Sig     alendronate (FOSAMAX) 70 MG tablet TAKE ONE TABLET BY MOUTH EVERY 7 DAYS. TAKE IN AM ON EMPTY STOMACH WITH WATER 30MIN B4 FOOD     apixaban (ELIQUIS) 5 mg Tab tablet Take 1 tablet (5 mg total) by mouth 2 (two) times a day.     aspirin 81 MG EC tablet Take 81 mg by mouth daily with breakfast.     atorvastatin (LIPITOR) 10 MG tablet TAKE ONE TABLET BY MOUTH ONCE DAILY     busPIRone (BUSPAR) 10 MG tablet Take 1 tablet (10 mg total) by mouth 3 (three) times a day.     busPIRone (BUSPAR) 5 MG tablet 1 tablet 3 times daily as needed for anxiety     cholecalciferol, vitamin D3, (CHOLECALCIFEROL) 1,000 unit tablet Take 1,000 Units by mouth daily.     diltiazem (CARDIZEM CD) 120 MG 24 hr capsule Take 1 capsule (120 mg total) by mouth daily.     DULoxetine (CYMBALTA) 30 MG capsule TAKE ONE CAPSULE BY MOUTH TWICE A DAY     furosemide (LASIX) 80 MG tablet TAKE ONE-HALF TAB (40MG) BY MOUTH ONCE DAILY     levothyroxine (SYNTHROID) 88 MCG tablet Take 1 tablet (88 mcg total) by mouth daily.     losartan-hydrochlorothiazide (HYZAAR) 100-25 mg per tablet TAKE ONE TABLET BY MOUTH ONCE DAILY     melatonin 3 mg Tab tablet Take 1 tablet (3 mg total) by mouth bedtime as needed.     metoprolol succinate (TOPROL-XL) 50 MG 24 hr tablet TAKE ONE TABLET BY MOUTH ONCE DAILY     montelukast (SINGULAIR) 10 mg tablet TAKE ONE TABLET BY MOUTH ONCE DAILY     multivitamin therapeutic (THERAGRAN) tablet Take 1 tablet by mouth daily.     nitroglycerin  "(NITROSTAT) 0.4 MG SL tablet Place 1 tablet (0.4 mg total) under the tongue every 5 (five) minutes as needed for chest pain.     NITROSTAT 0.4 mg SL tablet Place 0.4 mg under the tongue every 5 (five) minutes as needed for chest pain.      omeprazole (PRILOSEC) 20 MG capsule TAKE ONE CAPSULE BY MOUTH TWICE A DAY     peg 400-propylene glycol (SYSTANE) 0.4-0.3 % Drop Administer 1 drop to both eyes 4 (four) times a day as needed (dry eye).      potassium chloride (K-DUR,KLOR-CON) 20 MEQ tablet Take 1 tablet (20 mEq total) by mouth daily.     traMADol (ULTRAM) 50 mg tablet TAKE 1 TABLET (50 MG TOTAL) BY MOUTH EVERY 8 (EIGHT) HOURS AS NEEDED FOR PAIN.     VENTOLIN HFA 90 mcg/actuation inhaler INHALE 2 PUFFS BY MOUTH EVERY 6 HOURS AS NEEDED FOR WHEEZING       PSFHx: Tobacco Status:  She  reports that she quit smoking about 43 years ago. Her smoking use included cigarettes. She has a 7.50 pack-year smoking history. she has never used smokeless tobacco.    Review of Systems:  A comprehensive review of systems is negative except for the comments above    Objective:    Ht 4' 10\" (1.473 m)   Wt 143 lb (64.9 kg)   LMP  (LMP Unknown)   BMI 29.89 kg/m    GENERAL: No acute distress.  Weight is stable.  Blood pressure 126/78.  Pulse still 116-120 in atrial fibrillation is the rhythm.  Oxygen saturations 95% on room air.  No significant edema.  Lungs are without any wheezing.  No rales are heard heart shows persistent atrial fibrillation with persistent rapid ventricular rate.  No JVD.  No cyanosis.  Skin no jaundice.  Diffuse muscle tenderness consistent with her diagnosis of fibromyalgia.  No active synovitis of her joints    Assessment & Plan   Tori Do is a 87 y.o. female.    Atrial fibrillation    Continues  a rapid ventricular rate.  Going to add  the medication diltiazem 120 mg to see if we can slow her down a bit.  Schedule her for an echocardiogram.  Watch for any signs of wheezing since she has had some mild " wheezing in the past and she is now taking a beta-blocker.  I do not see any evidence of wheezing at this time.  She also tells me she was short of breath before she started taking the metoprolol.  Return to clinic in 2 weeks.    Diagnoses and all orders for this visit:    Paroxysmal atrial fibrillation (H)  -     diltiazem (CARDIZEM CD) 120 MG 24 hr capsule; Take 1 capsule (120 mg total) by mouth daily.  Dispense: 30 capsule; Refill: 11  -     Echo Complete; Future; Expected date: 11/27/2018    Iron deficiency anemia due to chronic blood loss    Hypertension        The following high BMI interventions were performed this visit: encouragement to exercise    Master Leyva MD  Transcription using voice recognition software, may contain typographical errors.

## 2021-06-21 NOTE — PROGRESS NOTES
OFFICE VISIT NOTE    Subjective:   Chief Complaint:  Follow-up (3 months follow up- fasting)    87-year-old woman in for follow-up regarding history of fibromyalgia with chronic pain, recurrent GI bleeding, Spiriva mild COPD.  She is having issues with some anxiety and continued aches and pains from her fibromyalgia.  Tramadol caused nausea and vomiting.  She wants something for her nerves.  Some depression as well.    Current Outpatient Prescriptions   Medication Sig     alendronate (FOSAMAX) 70 MG tablet TAKE ONE TABLET BY MOUTH EVERY 7 DAYS. TAKE IN AM ON EMPTY STOMACH WITH WATER 30MIN B4 FOOD     aspirin 81 MG EC tablet Take 81 mg by mouth daily with breakfast.     atorvastatin (LIPITOR) 10 MG tablet Take 1 tablet (10 mg total) by mouth daily.     cholecalciferol, vitamin D3, (CHOLECALCIFEROL) 1,000 unit tablet Take 1,000 Units by mouth daily.     DULoxetine (CYMBALTA) 30 MG capsule TAKE ONE CAPSULE BY MOUTH TWICE A DAY     furosemide (LASIX) 80 MG tablet TAKE ONE-HALF TAB (40MG) BY MOUTH ONCE DAILY     levothyroxine (SYNTHROID) 88 MCG tablet Take 1 tablet (88 mcg total) by mouth daily.     losartan-hydrochlorothiazide (HYZAAR) 100-25 mg per tablet TAKE ONE TABLET BY MOUTH ONCE DAILY     melatonin 3 mg Tab tablet Take 1 tablet (3 mg total) by mouth bedtime as needed.     metoprolol succinate (TOPROL-XL) 50 MG 24 hr tablet Take 1 tab daily     montelukast (SINGULAIR) 10 mg tablet TAKE ONE TABLET BY MOUTH ONCE DAILY     multivitamin therapeutic (THERAGRAN) tablet Take 1 tablet by mouth daily.     nitroglycerin (NITROSTAT) 0.4 MG SL tablet Place 1 tablet (0.4 mg total) under the tongue every 5 (five) minutes as needed for chest pain.     NITROSTAT 0.4 mg SL tablet Place 0.4 mg under the tongue every 5 (five) minutes as needed for chest pain.      omeprazole (PRILOSEC) 20 MG capsule TAKE ONE CAPSULE BY MOUTH TWICE A DAY     peg 400-propylene glycol (SYSTANE) 0.4-0.3 % Drop Administer 1 drop to both eyes 4 (four)  "times a day as needed (dry eye).      potassium chloride (K-DUR,KLOR-CON) 20 MEQ tablet Take 1 tablet (20 mEq total) by mouth daily.     VENTOLIN HFA 90 mcg/actuation inhaler INHALE 2 PUFFS BY MOUTH EVERY 6 HOURS AS NEEDED FOR WHEEZING     busPIRone (BUSPAR) 5 MG tablet 1 tablet 3 times daily as needed for anxiety       PSFHx: Tobacco Status:  She  reports that she quit smoking about 43 years ago. Her smoking use included Cigarettes. She has a 7.50 pack-year smoking history. She has never used smokeless tobacco.    Review of Systems:  A comprehensive review of systems is negative except for the comments above    Objective:    Ht 4' 10\" (1.473 m)  Wt 144 lb (65.3 kg)  BMI 30.1 kg/m2  GENERAL: No acute distress.  Weight is up 1 pound.  Blood pressure is 118/70.  Pulse is 104.  Oxygen saturations 97%.  Trace edema of the legs.  Good range of motion of the joints of the arms and legs but has pain with lifting arms pain with standing felt in the hips.  Lungs are clear  Heart shows a slight tachycardia.  Occasional premature beat.  Neurologic exam is normal.  No active synovitis of her joints.    Assessment & Plan   Tori Do is a 87 y.o. female.    Medically she is stable though has a lot of aches and pains.  She is quite anxious.  She is continuing with the medication Cymbalta.  I am going to add buspirone 5 mg 3 times daily as needed for anxiety.  Check hemoglobin and electrolytes.  Flu shot today.  Return to clinic in 2 weeks to see how she is doing.    Diagnoses and all orders for this visit:    Iron deficiency anemia due to chronic blood loss  -     HM1(CBC and Differential)  -     HM1 (CBC with Diff)    Fibromyalgia    Hypertension  -     Basic Metabolic Panel    Need for vaccination  -     Influenza High Dose, Seasonal 65+ yrs    Adjustment disorder with mixed anxiety and depressed mood  -     busPIRone (BUSPAR) 5 MG tablet; 1 tablet 3 times daily as needed for anxiety  Dispense: 60 tablet; Refill: " 0        The following high BMI interventions were performed this visit: encouragement to exercise    Master Leyva MD  Transcription using voice recognition software, may contain typographical errors.

## 2021-06-22 NOTE — PROGRESS NOTES
OFFICE VISIT NOTE    Subjective:   Chief Complaint:  Heart Problem    In for follow-up regarding atrial fibrillation.  She is has now for several weeks.  In the past she had atrial fibrillation was cardioverted.  She is taking the medication Eliquis to prevent strokes.  However, she has had GI bleeding in the past.  Hemoglobin has been stable but now she reports some black stools the last week or 2.  No bright red rectal bleeding.  No hematemesis.  Complains of some chest and epigastric discomfort.  No dysphasia.    Current Outpatient Medications   Medication Sig     alendronate (FOSAMAX) 70 MG tablet TAKE ONE TABLET BY MOUTH EVERY 7 DAYS. TAKE IN AM ON EMPTY STOMACH WITH WATER 30MIN B4 FOOD     apixaban (ELIQUIS) 5 mg Tab tablet Take 1 tablet (5 mg total) by mouth 2 (two) times a day.     aspirin 81 MG EC tablet Take 81 mg by mouth daily with breakfast.     atorvastatin (LIPITOR) 10 MG tablet TAKE ONE TABLET BY MOUTH ONCE DAILY     busPIRone (BUSPAR) 10 MG tablet TAKE 1 TABLET (10 MG TOTAL) BY MOUTH 3 (THREE) TIMES A DAY.     busPIRone (BUSPAR) 5 MG tablet 1 tablet 3 times daily as needed for anxiety     cholecalciferol, vitamin D3, (CHOLECALCIFEROL) 1,000 unit tablet Take 1,000 Units by mouth daily.     diltiazem (CARDIZEM CD) 120 MG 24 hr capsule Take 1 capsule (120 mg total) by mouth daily.     DULoxetine (CYMBALTA) 30 MG capsule TAKE ONE CAPSULE BY MOUTH TWICE A DAY     furosemide (LASIX) 80 MG tablet TAKE ONE-HALF TAB (40MG) BY MOUTH ONCE DAILY     levothyroxine (SYNTHROID) 88 MCG tablet Take 1 tablet (88 mcg total) by mouth daily.     losartan-hydrochlorothiazide (HYZAAR) 100-25 mg per tablet TAKE ONE TABLET BY MOUTH ONCE DAILY     melatonin 3 mg Tab tablet Take 1 tablet (3 mg total) by mouth bedtime as needed.     metoprolol succinate (TOPROL XL) 50 MG 24 hr tablet 2 tabs p.o. daily.     metoprolol succinate (TOPROL-XL) 50 MG 24 hr tablet TAKE ONE TABLET BY MOUTH ONCE DAILY     montelukast (SINGULAIR) 10 mg  "tablet TAKE ONE TABLET BY MOUTH ONCE DAILY     multivitamin therapeutic (THERAGRAN) tablet Take 1 tablet by mouth daily.     nitroglycerin (NITROSTAT) 0.4 MG SL tablet Place 1 tablet (0.4 mg total) under the tongue every 5 (five) minutes as needed for chest pain.     NITROSTAT 0.4 mg SL tablet Place 0.4 mg under the tongue every 5 (five) minutes as needed for chest pain.      omeprazole (PRILOSEC) 20 MG capsule TAKE ONE CAPSULE BY MOUTH TWICE A DAY     peg 400-propylene glycol (SYSTANE) 0.4-0.3 % Drop Administer 1 drop to both eyes 4 (four) times a day as needed (dry eye).      potassium chloride (K-DUR,KLOR-CON) 20 MEQ tablet Take 1 tablet (20 mEq total) by mouth daily.     traMADol (ULTRAM) 50 mg tablet TAKE 1 TABLET (50 MG TOTAL) BY MOUTH EVERY 8 (EIGHT) HOURS AS NEEDED FOR PAIN.     VENTOLIN HFA 90 mcg/actuation inhaler INHALE 2 PUFFS BY MOUTH EVERY 6 HOURS AS NEEDED FOR WHEEZING       PSFHx: Tobacco Status:  She  reports that she quit smoking about 44 years ago. Her smoking use included cigarettes. She has a 7.50 pack-year smoking history. she has never used smokeless tobacco.    Review of Systems:  A comprehensive review of systems is negative except for the comments above    Objective:    /62 (Patient Site: Left Arm, Patient Position: Sitting, Cuff Size: Adult Regular)   Pulse 84   Ht 4' 9\" (1.448 m)   Wt 146 lb (66.2 kg)   LMP  (LMP Unknown)   SpO2 98%   BMI 31.59 kg/m    GENERAL: No acute distress.  Weight is stable.  Blood pressure 116/62.  Pulse is 95 atrial fibrillation is the rhythm.  Oxygen saturations 97%.  No significant edema.  Lungs are free of any wheezes.  No rales heard today.  Heart shows persistent atrial fibrillation.  No JVD.  Slight epigastric tenderness.  No masses.  No pulsations.  No organomegaly.  Neurologic exam is normal.    Assessment & Plan   Tori Do is a 87 y.o. female.    Medically she seems stable.  Passing black stools is a little worrisome.  She is taking " iron.  We will recheck her hemoglobin to make sure it is not dropping.  She has been seen by cardiology and will be seen by the atrial fibrillation clinic in the next week.  Perhaps, she will be a candidate for a watchman device.  I would like to get her off of anticoagulation if possible.  Chronic recurring GI bleeding is a definite problem for her.    Diagnoses and all orders for this visit:    Iron deficiency anemia due to chronic blood loss  -     HM2(CBC w/o Differential)    COPD with asthma (H)    Gastrointestinal hemorrhage, unspecified gastrointestinal hemorrhage type    Paroxysmal atrial fibrillation (H)  -     Basic Metabolic Panel        The following high BMI interventions were performed this visit: encouragement to exercise    Master Leyva MD  Transcription using voice recognition software, may contain typographical errors.

## 2021-06-22 NOTE — PATIENT INSTRUCTIONS - HE
Tori Do,    It was a pleasure to see you today at the [facility].     My recommendations after this visit include:    24 hour holter in the next 2 weeks.  Wants [facility].  Ok to do today if available.    I discussed the watchman procedure today.  You need outpatient procedure called tranesophageal ECHO to see if watchman would fit in the pouch off your heart.  The watchman is to protect you from stroke risk and get you off Eliquis  within 2 months of procedure.  If you go on to get watchman placement, you will stay on Eliquis until 6 weeks post watchman.  You will then have another transesophageal echo, you will get a call at home within the week after and will be instructed to stop Eliquis and can anticipate that you will switch to aspirin 81 mg 1 tab p.o. daily plus Plavix 75 mg p.o. daily for 4 months.  After 4 months, you will stop Plavix and stay on aspirin 81 mg 1 tablet orally every day  Gardenia Kan RN at [phone] will call you when approved for watchman device and can give you an idea of when you would get watchman.      To followup with as planned before watchman.      My contact information:  Bryan Manzano, ARNAV  After Hours or Scheduling  [phone]  My Nurse---Fay Townsend [phone]

## 2021-06-22 NOTE — PROGRESS NOTES
OFFICE VISIT NOTE    Subjective:   Chief Complaint:  Follow-up    87-year-old woman in for follow-up regarding recurrent atrial fibrillation.  Also has a history of hypertension, fibromyalgia, chronic recurrent GI bleeding.  She is now on metoprolol 100 mg daily and diltiazem 120 mg daily.  She still has some shortness of breath.  On a worrisome note,  She had 2 days of some black stools about a week ago.  Currently, no abdominal pain.  No melena.  Still gets short of breath walking 30 or 40 feet.    Current Outpatient Medications   Medication Sig     alendronate (FOSAMAX) 70 MG tablet TAKE ONE TABLET BY MOUTH EVERY 7 DAYS. TAKE IN AM ON EMPTY STOMACH WITH WATER 30MIN B4 FOOD     apixaban (ELIQUIS) 5 mg Tab tablet Take 1 tablet (5 mg total) by mouth 2 (two) times a day.     aspirin 81 MG EC tablet Take 81 mg by mouth daily with breakfast.     atorvastatin (LIPITOR) 10 MG tablet TAKE ONE TABLET BY MOUTH ONCE DAILY     busPIRone (BUSPAR) 10 MG tablet Take 1 tablet (10 mg total) by mouth 3 (three) times a day.     busPIRone (BUSPAR) 5 MG tablet 1 tablet 3 times daily as needed for anxiety     cholecalciferol, vitamin D3, (CHOLECALCIFEROL) 1,000 unit tablet Take 1,000 Units by mouth daily.     diltiazem (CARDIZEM CD) 120 MG 24 hr capsule Take 1 capsule (120 mg total) by mouth daily.     DULoxetine (CYMBALTA) 30 MG capsule TAKE ONE CAPSULE BY MOUTH TWICE A DAY     furosemide (LASIX) 80 MG tablet TAKE ONE-HALF TAB (40MG) BY MOUTH ONCE DAILY     levothyroxine (SYNTHROID) 88 MCG tablet Take 1 tablet (88 mcg total) by mouth daily.     losartan-hydrochlorothiazide (HYZAAR) 100-25 mg per tablet TAKE ONE TABLET BY MOUTH ONCE DAILY     melatonin 3 mg Tab tablet Take 1 tablet (3 mg total) by mouth bedtime as needed.     metoprolol succinate (TOPROL XL) 50 MG 24 hr tablet 2 tabs p.o. daily.     metoprolol succinate (TOPROL-XL) 50 MG 24 hr tablet TAKE ONE TABLET BY MOUTH ONCE DAILY     montelukast (SINGULAIR) 10 mg tablet TAKE ONE  "TABLET BY MOUTH ONCE DAILY     multivitamin therapeutic (THERAGRAN) tablet Take 1 tablet by mouth daily.     nitroglycerin (NITROSTAT) 0.4 MG SL tablet Place 1 tablet (0.4 mg total) under the tongue every 5 (five) minutes as needed for chest pain.     NITROSTAT 0.4 mg SL tablet Place 0.4 mg under the tongue every 5 (five) minutes as needed for chest pain.      omeprazole (PRILOSEC) 20 MG capsule TAKE ONE CAPSULE BY MOUTH TWICE A DAY     peg 400-propylene glycol (SYSTANE) 0.4-0.3 % Drop Administer 1 drop to both eyes 4 (four) times a day as needed (dry eye).      potassium chloride (K-DUR,KLOR-CON) 20 MEQ tablet Take 1 tablet (20 mEq total) by mouth daily.     traMADol (ULTRAM) 50 mg tablet TAKE 1 TABLET (50 MG TOTAL) BY MOUTH EVERY 8 (EIGHT) HOURS AS NEEDED FOR PAIN.     VENTOLIN HFA 90 mcg/actuation inhaler INHALE 2 PUFFS BY MOUTH EVERY 6 HOURS AS NEEDED FOR WHEEZING       PSFHx: Tobacco Status:  She  reports that she quit smoking about 43 years ago. Her smoking use included cigarettes. She has a 7.50 pack-year smoking history. she has never used smokeless tobacco.    Review of Systems:  A comprehensive review of systems is negative except for the comments above    Objective:    Ht 4' 10\" (1.473 m)   Wt 145 lb (65.8 kg)   LMP  (LMP Unknown)   BMI 30.31 kg/m    GENERAL: No acute distress.  Weight is up 2 pounds  Blood pressure is 126/80.  Pulse is 104 atrial fibrillation the rhythm.  She is not as fast as last time in.  Lungs are free of any or wheezes patient 98%.  Heart does show atrial fibrillation.  Rate between 101 10.  Trace edema only.  Neurologic exam is normal.  No jaundice.  She does not  look unusually pale  Abdomen is not tender    Assessment & Plan   Tori Do is a 87 y.o. female.    Persistent atrial fibrillation.  Ventricular rate is marginal at this time.  Concerned about the black stools.  Not had any bleeding the last 3 days.  Will check her hemoglobin and a basic metabolic profile.  Refer " her over to rapid access cardiology.  See if they would cardiovert her.  She was cardioverted in 2015 and she may need sinus rhythm for 3 years.  Recent echo showed normal ventricular ejection fraction.  However, left atrium is larger.  Blood pressure meds remain the same  She continues on omeprazole for GI prophylaxis.    Diagnoses and all orders for this visit:    Hypertension  -     Basic Metabolic Panel    Iron deficiency anemia due to chronic blood loss  -     HM1(CBC and Differential)    Persistent atrial fibrillation with rapid ventricular response (H)  -     metoprolol succinate (TOPROL XL) 50 MG 24 hr tablet; 2 tabs p.o. daily.  Dispense: 60 tablet; Refill: 3  -     Ambulatory referral to [facility]        The following high BMI interventions were performed this visit: encouragement to exercise    Master Leyva MD  Transcription using voice recognition software, may contain typographical errors.

## 2021-06-22 NOTE — PROGRESS NOTES
Consultation - [facility]  Tori Do,  4/3/1931, MRN [id number]    PCP: Master Leyva MD, [phone]    Assessment and Plan: Atrial fibrillation.  Appears that she has been in atrial fibrillation at least since October.  We will arrange for follow-up in the A. fib clinic.  Currently on Eliquis and rate control therapy.  Has some issues with bleeding more recently.  Has a chads 2 Vascor of 4.  For now continue with Eliquis therapy.  Suspect that her symptoms are related to the atrial fibrillation.  Echo shows normal left ventricle the function but with changes suggestive of diastolic LV decompensation.  Recommendations: Eliquis for anticoagulation and continuation of rate control therapy.  Heart rate appears to be controlled at the present time.  M and arrange for evaluation in atrial fibrillation clinic.  For now would continue with both metoprolol and Eliquis therapy.  However for a long-term strategy in view of her AV a consideration for placement of a watchman device and possible ablation might be beneficial in allowing us to consider withdrawing anticoagulant therapy.  For now continue with current therapy and arrange for follow-up in the A. fib clinic.    Chief Complaint: Weakness and palpitations    HPI:  We have been requested by Dr Cortez to evaluate Tori Do for consultation who is a  87 y.o. year old female for weakness.   Hx: Weak palpitations for several yaers.  Chest pain, center, dull exertion.  SOB with exertion.  LH but no syncope.  Internal bleeding, has history of upper gi bleed.  BM with melena.  Saw Dr Cortez for this.  Has had high BP.  Started on Eliquis.    Echo last week:     1. Normal left ventricular size and systolic performance with a visually estimated ejection fraction of 60%.   2. There is mild to moderate tricuspid insufficiency.   3. Normal right ventricular size and systolic performance.   4. There is severe left atrial enlargement.   5. There is  moderate right atrial enlargement.          Medical History  Active Ambulatory (Non-Hospital) Problems    Diagnosis     Anxiety     AV malformation of GI tract     COPD (chronic obstructive pulmonary disease) (H)     Hyperlipidemia     Ovarian cancer (H)     Hypothyroidism     Fibromyalgia     Iron deficiency anemia due to chronic blood loss     Gastric ulcer     Atrial fibrillation -- S/P cardioversion 2015     Anemia     GI bleeding     Hypertension     Coronary Artery Disease     Carotid Atherosclerosis     Past Medical History:   Diagnosis Date     A-fib (H)      Advanced care planning/counseling discussion      Anemia      Asthma      CAD (coronary artery disease) 2005     Carotid atherosclerosis      Carpal tunnel syndrome      COPD (chronic obstructive pulmonary disease) (H) 5/7/2015     Fibromyalgia      GI (gastrointestinal bleed) 5/5/2015     History of transfusion      Hyperlipidemia      Hypertension      Hypothyroidism      Murmur, cardiac      Osteoarthritis      Ovarian cancer (H)      Paroxysmal atrial fibrillation (H) 2015       Surgical History  She  has a past surgical history that includes pr insert intracoronary stent; Hysterectomy; Bilateral salpingoophorectomy; Appendectomy; Colectomy; Back surgery (1995); Dilation and curettage of uterus; ESOPHAGOGASTRODUODENOSCOPY with 7french bicap (N/A, 5/14/2016); and ESOPHAGOGASTRODUODENOSCOPY with gastric biopsy (N/A, 5/6/2015).    Social History  Reviewed, and she  reports that she quit smoking about 43 years ago. Her smoking use included cigarettes. She has a 7.50 pack-year smoking history. she has never used smokeless tobacco. She reports that she does not drink alcohol or use drugs.  Smoking status reviewed.  Social history othrwise not contributory to HPI.  Allergies  No Known Allergies    Family History  Reviewed, and family history includes Asthma in her brother; Colon cancer in her mother; Heart disease in her father.  Extended Emergency Contact  "Information  Primary Emergency Contact: Patricia Wells   [facility]  Home Phone: [phone]  Work Phone: [phone]  Mobile Phone: [phone]  Relation: Child  Family history otherwise negative or not conributory to HPI.    Psychosocial Needs  Social History     Social History Narrative    , lives by herself in a house.  Has 3 children, is DNR. Patients   in .  (last updated 2016)      Additional psychosocial needs reviewed per nursing assessment.    Prior to Admission Medications    (Not in a hospital admission)    Review of Systems:  A 12 point comprehensive review of systems was negative except as noted.  Review of systems is negative except for HPI  Physical Exam:  Less than 10 mL of urine  Vitals:    18 1521   BP: 110/60   Pulse: 60   Resp: (!) 50     Head and neck without focal cranial neurologic defects.  JVD not distended.  Carotid upstroke normal without bruit.  External eye exam normal without icterus.  External ear exam normal.  Neck without cervical lymphadenopathy or thyromegaly.  /60 (Patient Site: Right Arm, Patient Position: Sitting, Cuff Size: Adult Regular)   Pulse 60   Resp (!) 50   Ht 4' 9.99\" (1.473 m)   Wt 143 lb 3.2 oz (65 kg)   LMP  (LMP Unknown)   BMI 29.94 kg/m    General appearance: alert, appears stated age and cooperative  Lungs: clear to auscultation bilaterally  Heart: irregularly irregular rhythm and S1, S2 normal   Abdomen with normal bowel tones.  Skin without rash, ecchymosis, lesions.  Neuromuscular tone normal.  Peripheral pulse intact and equal.  Joints without swelling or erythema.      [unfilled]    Pertinent Labs  Lab Results: personally reviewed.   Lab Results   Component Value Date     2018    K 3.8 2018    CL 97 (L) 2018    CO2 28 2018    BUN 25 2018    CREATININE 1.00 2018    CALCIUM 10.2 2018     Lab Results   Component Value Date    CKMB 2 2014    " TROPONINI 0.03 05/14/2016     Lab Results   Component Value Date    WBC 6.1 12/11/2018    WBC 6.2 09/22/2015    HGB 12.7 12/11/2018    HCT 38.6 12/11/2018    MCV 99 12/11/2018     12/11/2018     Lab Results   Component Value Date    CHOL 161 07/17/2018    TRIG 103 07/17/2018    HDL 71 07/17/2018       Pertinent Radiology  Radiology Results: See Report  EKG Results: See Report       Current Outpatient Medications:      alendronate (FOSAMAX) 70 MG tablet, TAKE ONE TABLET BY MOUTH EVERY 7 DAYS. TAKE IN AM ON EMPTY STOMACH WITH WATER 30MIN B4 FOOD, Disp: 12 tablet, Rfl: 3     apixaban (ELIQUIS) 5 mg Tab tablet, Take 1 tablet (5 mg total) by mouth 2 (two) times a day., Disp: 60 tablet, Rfl: 2     aspirin 81 MG EC tablet, Take 81 mg by mouth daily with breakfast., Disp: , Rfl:      atorvastatin (LIPITOR) 10 MG tablet, TAKE ONE TABLET BY MOUTH ONCE DAILY, Disp: 90 tablet, Rfl: 1     busPIRone (BUSPAR) 10 MG tablet, Take 1 tablet (10 mg total) by mouth 3 (three) times a day., Disp: 90 tablet, Rfl: 1     busPIRone (BUSPAR) 5 MG tablet, 1 tablet 3 times daily as needed for anxiety, Disp: 60 tablet, Rfl: 0     cholecalciferol, vitamin D3, (CHOLECALCIFEROL) 1,000 unit tablet, Take 1,000 Units by mouth daily., Disp: , Rfl:      diltiazem (CARDIZEM CD) 120 MG 24 hr capsule, Take 1 capsule (120 mg total) by mouth daily., Disp: 30 capsule, Rfl: 11     DULoxetine (CYMBALTA) 30 MG capsule, TAKE ONE CAPSULE BY MOUTH TWICE A DAY, Disp: 60 capsule, Rfl: 2     furosemide (LASIX) 80 MG tablet, TAKE ONE-HALF TAB (40MG) BY MOUTH ONCE DAILY, Disp: 45 tablet, Rfl: 1     levothyroxine (SYNTHROID) 88 MCG tablet, Take 1 tablet (88 mcg total) by mouth daily., Disp: 100 tablet, Rfl: 3     losartan-hydrochlorothiazide (HYZAAR) 100-25 mg per tablet, TAKE ONE TABLET BY MOUTH ONCE DAILY, Disp: 90 tablet, Rfl: 1     melatonin 3 mg Tab tablet, Take 1 tablet (3 mg total) by mouth bedtime as needed., Disp: , Rfl: 0     metoprolol succinate (TOPROL  XL) 50 MG 24 hr tablet, 2 tabs p.o. daily., Disp: 60 tablet, Rfl: 3     metoprolol succinate (TOPROL-XL) 50 MG 24 hr tablet, TAKE ONE TABLET BY MOUTH ONCE DAILY, Disp: 90 tablet, Rfl: 1     montelukast (SINGULAIR) 10 mg tablet, TAKE ONE TABLET BY MOUTH ONCE DAILY, Disp: 30 tablet, Rfl: 0     multivitamin therapeutic (THERAGRAN) tablet, Take 1 tablet by mouth daily., Disp: , Rfl:      nitroglycerin (NITROSTAT) 0.4 MG SL tablet, Place 1 tablet (0.4 mg total) under the tongue every 5 (five) minutes as needed for chest pain., Disp: 100 tablet, Rfl: 3     NITROSTAT 0.4 mg SL tablet, Place 0.4 mg under the tongue every 5 (five) minutes as needed for chest pain. , Disp: , Rfl:      omeprazole (PRILOSEC) 20 MG capsule, TAKE ONE CAPSULE BY MOUTH TWICE A DAY, Disp: 90 capsule, Rfl: 3     peg 400-propylene glycol (SYSTANE) 0.4-0.3 % Drop, Administer 1 drop to both eyes 4 (four) times a day as needed (dry eye). , Disp: , Rfl:      potassium chloride (K-DUR,KLOR-CON) 20 MEQ tablet, Take 1 tablet (20 mEq total) by mouth daily., Disp: 90 tablet, Rfl: 3     traMADol (ULTRAM) 50 mg tablet, TAKE 1 TABLET (50 MG TOTAL) BY MOUTH EVERY 8 (EIGHT) HOURS AS NEEDED FOR PAIN., Disp: 60 tablet, Rfl: 0     VENTOLIN HFA 90 mcg/actuation inhaler, INHALE 2 PUFFS BY MOUTH EVERY 6 HOURS AS NEEDED FOR WHEEZING, Disp: 1 each, Rfl: 5

## 2021-06-23 NOTE — TELEPHONE ENCOUNTER
----- Message from Forrest Sidhu sent at 2/4/2019  4:03 PM CST -----  Contact: Pt  General phone call:    Caller: [facility] with Pt there    Primary cardiologist: Bryan SHEARER     Detailed reason for call: Pt was seen 1/2/19 and was to have WATCHMAN?   [facility] clinic called with Pt to see whats going on with this? What is the plan?  What should the Pt be doing now?    It wasn't too clear in the notes, Pt did have holter which was reviewed and may be a med change?  Please reach out to the Pt to let her know - thank you     New or active symptoms? Na    Best phone number: home    Best time to contact: any  Ok to leave a detailed message? yes    Device? na    Additional Info:

## 2021-06-23 NOTE — TELEPHONE ENCOUNTER
Controlled Substance Refill Request; tramadol  Medication:   Requested Prescriptions     Pending Prescriptions Disp Refills     ELIQUIS 5 mg Tab tablet [Pharmacy Med Name: ELIQUIS 5MG TAB TABLET] 60 tablet 2     Sig: TAKE 1 TABLET (5 MG TOTAL) BY MOUTH 2 (TWO) TIMES A DAY.     levothyroxine (SYNTHROID, LEVOTHROID) 88 MCG tablet [Pharmacy Med Name: LEVOTHYROXINE 88MCG TAB TABLET] 100 tablet 3     Sig: TAKE ONE TABLET BY MOUTH ONCE DAILY     traMADol (ULTRAM) 50 mg tablet [Pharmacy Med Name: TRAMADOL HCL 50MG TAB TABLET] 60 tablet      Sig: TAKE 1 TABLET (50 MG TOTAL) BY MOUTH EVERY 8 (EIGHT) HOURS AS NEEDED FOR PAIN.     Date Last Fill: 12/19/2018 #60  [facility]  Controlled Substance Agreement on File:   Encounter-Level CSA Scan Date:    There are no encounter-level csa scan date.       Last office visit: 12/26/2018. Last office visit pertaining to requested medication was 10/31/2018 ordered by phone per Dr Leyva

## 2021-06-23 NOTE — TELEPHONE ENCOUNTER
Call reviewed with Gardenia Adams contacted for watchman and pt was called back reviewed she will receive a call for this.  Pt had recent increase of metoprolol from Holter report now Bryan requesting pt to be scheduled for follow up holter  Order placed,  notified  Pt understands and agrees to plan.   Pt has my direct number if needed.

## 2021-06-23 NOTE — TELEPHONE ENCOUNTER
----- Message from Monet Cardoso CNP sent at 1/10/2019  3:43 PM CST -----  Monitor shows elevated heart rates.  Increase metoprolol to 150 mg daily.  Will review with Jan when she returns.

## 2021-06-23 NOTE — PROGRESS NOTES
Office Visit - Follow Up   Tori Do   87 y.o. female    Date of Visit: 2/4/2019    Chief Complaint   Patient presents with     Follow-up     saw Dr Leyva 4 weeks ago, here to follow up on heart issues         Assessment and Plan   1. Chronic atrial fibrillation (H)  Persistent atrial fibrillation.  Recent Holter monitor indicating inadequate rate control.  Toprol-XL increased to 150 mg daily and heart rate in the 80s today.  Tolerating increased dose combined with diltiazem with blood pressure looking fine.  Remains on Eliquis.  Agree with plans for watch man procedure as she is at high risk for recurrent GI bleeding.  She did meet with cardiology and I reviewed these records.  She has still not received a call whether she is approved for the procedure and we will contact the heart clinic to confirm the status and next steps including presumably a PETEY.    2. Iron deficiency anemia due to chronic blood loss  We will monitor hemoglobin which was normal 4 weeks ago.  She did have another episode of loose dark stools concerning for recurrent GI bleeding.  As above, getting her off chronic anticoagulation would be a good goal after watch man is completed.  She continues iron daily  - Hemoglobin    3. Hypothyroidism, unspecified type  We will recheck TSH to make sure current dose of Synthroid remains appropriate especially with recent tachycardia  - Thyroid Stimulating Hormone (TSH)    4. Essential hypertension  Blood pressure looks well-controlled even with higher dose of Toprol XL.  She will continue this along with diltiazem and she remains on diuretic.  Will monitor electrolytes and renal function  - Basic Metabolic Panel    Return in about 3 months (around 5/4/2019) for Recheck.     History of Present Illness   This 87 y.o. old woman with complicated medical history including coronary artery disease and hypertension with chronic iron deficiency anemia from chronic blood loss related to AVMs and peptic ulcer  disease who is on chronic anticoagulation with Eliquis for persistent atrial fibrillation.  She is here to follow-up after evaluation in late December with reports of recent GI bleeding.  She had a couple episodes of melena.  Her stools are always somewhat dark because of her iron use.  Hemoglobin at that time was stable above 14.  She describes another episode of dark black loose stools subsequent to that visit but none since and none in the last 2 weeks.  No red blood has been seen.  Denies abdominal pain.  She met with cardiology and watch man procedure was discussed.  It was recommended that she continue Eliquis.  A 24-hour Holter monitor was obtained showing her to be in persistent atrial fibrillation with rates into the 140s.  Metoprolol XL was increased to 150 mg daily.  She also remains on diltiazem.  She reports ongoing dyspnea with exertion.  Occasional palpitations and she will feel some left-sided chest pain that radiates into her shoulder that resolved spontaneously.  She will usually not take nitroglycerin.  She remains on the same dose of furosemide, one half of an 80 mg tablet daily.  Edema has been under control.    Review of Systems:  Otherwise, a comprehensive review of systems was negative except as noted.     Medications, Allergies and Problem List   Patient Active Problem List   Diagnosis     Essential hypertension     Coronary Artery Disease     Carotid Atherosclerosis     Anemia     GI bleeding     Chronic atrial fibrillation (H)     Gastric ulcer     Iron deficiency anemia due to chronic blood loss     Fibromyalgia     COPD (chronic obstructive pulmonary disease) (H)     Hyperlipidemia     Ovarian cancer (H)     Hypothyroidism     AV malformation of GI tract     Anxiety       She has a past surgical history that includes pr insert intracoronary stent; Hysterectomy; Bilateral salpingoophorectomy; Appendectomy; Colectomy; Back surgery (1995); Dilation and curettage of uterus;  ESOPHAGOGASTRODUODENOSCOPY with 7french bicap (N/A, 5/14/2016); and ESOPHAGOGASTRODUODENOSCOPY with gastric biopsy (N/A, 5/6/2015).    No Known Allergies    Current Outpatient Medications   Medication Sig Dispense Refill     alendronate (FOSAMAX) 70 MG tablet TAKE ONE TABLET BY MOUTH EVERY 7 DAYS. TAKE IN AM ON EMPTY STOMACH WITH WATER 30MIN B4 FOOD 12 tablet 3     aspirin 81 MG EC tablet Take 81 mg by mouth daily with breakfast.       atorvastatin (LIPITOR) 10 MG tablet TAKE ONE TABLET BY MOUTH ONCE DAILY 90 tablet 1     busPIRone (BUSPAR) 10 MG tablet TAKE 1 TABLET (10 MG TOTAL) BY MOUTH 3 (THREE) TIMES A DAY. 90 tablet 11     busPIRone (BUSPAR) 5 MG tablet 1 tablet 3 times daily as needed for anxiety 60 tablet 0     cholecalciferol, vitamin D3, (CHOLECALCIFEROL) 1,000 unit tablet Take 1,000 Units by mouth daily.       diltiazem (CARDIZEM CD) 120 MG 24 hr capsule Take 1 capsule (120 mg total) by mouth daily. 30 capsule 11     DULoxetine (CYMBALTA) 30 MG capsule TAKE ONE CAPSULE BY MOUTH TWICE A DAY 60 capsule 2     ELIQUIS 5 mg Tab tablet TAKE 1 TABLET (5 MG TOTAL) BY MOUTH 2 (TWO) TIMES A DAY. 60 tablet 2     furosemide (LASIX) 80 MG tablet TAKE ONE-HALF TAB (40MG) BY MOUTH ONCE DAILY 45 tablet 1     levothyroxine (SYNTHROID, LEVOTHROID) 88 MCG tablet Take 1 tablet (88 mcg total) by mouth daily. 90 tablet 3     losartan-hydrochlorothiazide (HYZAAR) 100-25 mg per tablet TAKE ONE TABLET BY MOUTH ONCE DAILY 90 tablet 3     melatonin 3 mg Tab tablet Take 1 tablet (3 mg total) by mouth bedtime as needed.  0     metoprolol succinate (TOPROL XL) 50 MG 24 hr tablet Take 3 tabs p.o. daily. 270 tablet 3     montelukast (SINGULAIR) 10 mg tablet TAKE ONE TABLET BY MOUTH ONCE DAILY 30 tablet 0     multivitamin therapeutic (THERAGRAN) tablet Take 1 tablet by mouth daily.       NITROSTAT 0.4 mg SL tablet Place 0.4 mg under the tongue every 5 (five) minutes as needed for chest pain.        omeprazole (PRILOSEC) 20 MG capsule  "TAKE ONE CAPSULE BY MOUTH TWICE A DAY 90 capsule 3     peg 400-propylene glycol (SYSTANE) 0.4-0.3 % Drop Administer 1 drop to both eyes 4 (four) times a day as needed (dry eye).        potassium chloride (K-DUR,KLOR-CON) 20 MEQ tablet Take 1 tablet (20 mEq total) by mouth daily. 90 tablet 3     traMADol (ULTRAM) 50 mg tablet TAKE 1 TABLET (50 MG TOTAL) BY MOUTH EVERY 8 (EIGHT) HOURS AS NEEDED FOR PAIN. 60 tablet 0     VENTOLIN HFA 90 mcg/actuation inhaler INHALE 2 PUFFS BY MOUTH EVERY 6 HOURS AS NEEDED FOR WHEEZING 1 each 5     No current facility-administered medications for this visit.         Physical Exam   General Appearance:   Well-appearing elderly woman    /70 (Patient Site: Left Arm, Patient Position: Sitting, Cuff Size: Adult Large)   Pulse 84   Ht 4' 9\" (1.448 m)   Wt 141 lb (64 kg)   LMP  (LMP Unknown)   SpO2 94%   BMI 30.51 kg/m        Respiratory: Normal respiratory effort.  Lungs are clear with no rales or wheezes.  Heart: Irregularly irregular with good rate control  Extremities: 1+ bilateral lower extremity edema  Neurologic: Grossly nonfocal  Skin: No cyanosis or pallor           Additional Information   Social History     Tobacco Use     Smoking status: Former Smoker     Packs/day: 0.50     Years: 15.00     Pack years: 7.50     Types: Cigarettes     Last attempt to quit: 1975     Years since quittin.1     Smokeless tobacco: Never Used   Substance Use Topics     Alcohol use: No     Comment: denies     Drug use: No         Review and/or order of clinical lab tests: Recheck hemoglobin along with TSH and BMP    Review and/or order of medicine tests: Reviewed results of 24-hour Holter monitor showing persistent atrial fibrillation with rate up to 140s in late December    Review and summarization of old records and/or obtaining history from someone other than the patient and.or discussion of case with another health care provider: Reviewed records from cardiology appointment in late " December with discussion regarding benefits of watch man procedure.  PETEY will be necessary once approval is made.  She remains on Eliquis.  Metoprolol XL increased to 150 mg after Holter monitor showing high heart rates.       Ran Yin MD

## 2021-06-23 NOTE — TELEPHONE ENCOUNTER
Refill Request  Did you contact pharmacy: Yes  Medication name:   Requested Prescriptions     Pending Prescriptions Disp Refills     ELIQUIS 5 mg Tab tablet [Pharmacy Med Name: ELIQUIS 5MG TAB TABLET] 60 tablet 2     Sig: TAKE 1 TABLET (5 MG TOTAL) BY MOUTH 2 (TWO) TIMES A DAY.     levothyroxine (SYNTHROID, LEVOTHROID) 88 MCG tablet [Pharmacy Med Name: LEVOTHYROXINE 88MCG TAB TABLET] 100 tablet 3     Sig: TAKE ONE TABLET BY MOUTH ONCE DAILY     traMADol (ULTRAM) 50 mg tablet [Pharmacy Med Name: TRAMADOL HCL 50MG TAB TABLET] 60 tablet      Sig: TAKE 1 TABLET (50 MG TOTAL) BY MOUTH EVERY 8 (EIGHT) HOURS AS NEEDED FOR PAIN.     Who prescribed the medication: Master Leyva MD   Pharmacy Name and Location: [address]  Is patient out of medication: No.  2 days left  Patient notified refills processed in 72 hours:  yes  Okay to leave a detailed message: no

## 2021-06-23 NOTE — TELEPHONE ENCOUNTER
Patient Returning Call  Reason for call:  Patient returning call to check on the status of this call  Information relayed to patient:  Pending providers review.  Patient has additional questions:  YES  If YES, what are your questions/concerns:  I need to have a heart procedure done and I must not miss a day of my Eliquis and my metoprolol. I have called before and my pharmacist has called. Please send ASAP  Okay to leave a detailed message?: Yes

## 2021-06-23 NOTE — TELEPHONE ENCOUNTER
Controlled Substance Refill Request  Medication Name: traMADol (ULTRAM) 50 mg tablet  Date Last Fill: 12/19/18  Pharmacy: [address]      Submit electronically to pharmacy  Controlled Substance Agreement Date Scanned:   Encounter-Level CSA Scan Date:    There are no encounter-level csa scan date.       Last office visit with prescriber/PCP: 12/26/2018 Master Leyva MD OR same dept: 12/26/2018 Master Leyva MD OR same specialty: 12/26/2018 Master Leyva MD  Last physical: Visit date not found Last MTM visit: Visit date not found

## 2021-06-23 NOTE — TELEPHONE ENCOUNTER
Dr. Leyva,  Okay to send prescription with dose increase based on message below?  Please advise.  Thank you.  Libra RICHARDS, AILYN/CORTEZ....................11:02 AM

## 2021-06-23 NOTE — TELEPHONE ENCOUNTER
Refill Approved    Rx renewed per Medication Renewal Policy. Medication was last renewed on 08 07 18    [facility] Triage/Med Refill 1/24/2019     Requested Prescriptions   Pending Prescriptions Disp Refills     losartan-hydrochlorothiazide (HYZAAR) 100-25 mg per tablet [Pharmacy Med Name: LOSARTAN HCTZTB 100/2590NSTAR@ 100MG-25MG TABLET] 90 tablet 1     Sig: TAKE ONE TABLET BY MOUTH ONCE DAILY    Diuretics/Combination Diuretics Refill Protocol  Passed - 1/22/2019 10:15 AM       Passed - Visit with PCP or prescribing provider visit in past 12 months    Last office visit with prescriber/PCP: Visit date not found OR same dept: 12/26/2018 Master Leyva MD OR same specialty: 12/26/2018 Master Leyva MD  Last physical: Visit date not found Last MTM visit: Visit date not found   Next visit within 3 mo: Visit date not found  Next physical within 3 mo: Visit date not found  Prescriber OR PCP: Lobito Sousa MD  Last diagnosis associated with med order: 1. Hypertension  - losartan-hydrochlorothiazide (HYZAAR) 100-25 mg per tablet [Pharmacy Med Name: LOSARTAN HCTZTB 100/2590NSTAR@ 100MG-25MG TABLET]; TAKE ONE TABLET BY MOUTH ONCE DAILY  Dispense: 90 tablet; Refill: 1    If protocol passes may refill for 12 months if within 3 months of last provider visit (or a total of 15 months).            Passed - Serum Potassium in past 12 months     Lab Results   Component Value Date    Potassium 3.6 12/26/2018            Passed - Serum Sodium in past 12 months     Lab Results   Component Value Date    Sodium 141 12/26/2018            Passed - Blood pressure on file in past 12 months    BP Readings from Last 1 Encounters:   01/02/19 122/88            Passed - Serum Creatinine in past 12 months     Creatinine   Date Value Ref Range Status   12/26/2018 1.10 0.60 - 1.10 mg/dL Final

## 2021-06-24 NOTE — TELEPHONE ENCOUNTER
Who is calling:  Patient  Reason for Call:  Patient is calling regarding refill of inhaler. She is completely out of medication and would like covering provider for Dr. Leyva, preferably  Dr. Yin to help with getting her refill today, she is having another prescription delivered from pharmacy and is hoping to have inhaler refilled before 1:00 or 2:00. Thank you  Date of last appointment with primary care: none  Has the patient been recently seen:  Yes  Okay to leave a detailed message: Yes

## 2021-06-24 NOTE — TELEPHONE ENCOUNTER
Refill Approved    Rx renewed per Medication Renewal Policy. Medication was last renewed on 9/5/18.    Last office visit 12/26/18    [facility] Mey[facility], [facility] Triage/Med Refill 2/21/2019     Requested Prescriptions   Pending Prescriptions Disp Refills     VENTOLIN HFA 90 mcg/actuation inhaler [Pharmacy Med Name: VENTOLIN HFA INH 90MCG INHALANT] 1 Inhaler 0     Sig: INHALE 2 PUFFS BY MOUTH EVERY 6 HOURS AS NEEDED FOR WHEEZING    Albuterol/Levalbuterol Refill Protocol Passed - 2/21/2019 10:31 AM       Passed - PCP or prescribing provider visit in last year    Last office visit with prescriber/PCP: 12/26/2018 Master Leyva MD OR same dept: 2/4/2019 Ran Yin MD OR same specialty: 2/4/2019 Ran Yin MD Last physical: Visit date not found       Next appt within 3 mo: Visit date not found  Next physical within 3 mo: Visit date not found  Prescriber OR PCP: Master Leyva MD  Last diagnosis associated with med order: 1. Chronic obstructive pulmonary disease, unspecified COPD type (H)  - VENTOLIN HFA 90 mcg/actuation inhaler [Pharmacy Med Name: VENTOLIN HFA INH 90MCG INHALANT]; INHALE 2 PUFFS BY MOUTH EVERY 6 HOURS AS NEEDED FOR WHEEZING  Dispense: 18 each    If protocol passes may refill for 6 months if within 3 months of last provider visit (or a total of 9 months). If patient requesting >1 inhaler per month refill x 6 months and have patient make appointment with provider.

## 2021-06-24 NOTE — TELEPHONE ENCOUNTER
I called Tori with results of 24-hour Holter.  Average heart rate is 87.  Heart rate is slightly elevated during the daytime.  I clarified that she is taking metoprolol succinate 50 mg 3 tablets every day and not 2.  My previous notation of her dose is wrong in my clinic note.  She is taking diltiazem 120 mg orally every day and recommended to increase this to 180 mg orally every day.  To start when this medication is delivered.  She went and got her medication bottles and repeated instructions back to me.  No repeat Holter needed.

## 2021-06-24 NOTE — TELEPHONE ENCOUNTER
Controlled Substance Refill Request  Medication:   Requested Prescriptions     Pending Prescriptions Disp Refills     traMADol (ULTRAM) 50 mg tablet [Pharmacy Med Name: TRAMADOL 50MG TAB TABLET] 60 tablet      Sig: TAKE 1 TABLET (50 MG TOTAL) BY MOUTH EVERY 8 (EIGHT) HOURS AS NEEDED FOR PAIN.     Date Last Fill: 1/24/19  #60 R-0  Pharmacy: [facility]   Submit electronically to pharmacy  Controlled Substance Agreement on File:   Encounter-Level CSA Scan Date:    There are no encounter-level csa scan date.       Last office visit: 2/4/2019 Ran Yin MD Katie Beck RN Triage Nurse Advisor [facility]

## 2021-06-25 NOTE — TELEPHONE ENCOUNTER
Patient is out of medication.  She is hoping this can be filled today before 11:00 a.m. Her medications get delivered to her and they arrive between 1:00-2:00 p.m.

## 2021-06-27 NOTE — PROGRESS NOTES
Progress Notes by Rachel Manzano CNP at 1/2/2019 10:30 AM     Author: Rachel Manzano CNP Service: -- Author Type: Nurse Practitioner    Filed: 1/2/2019 12:12 PM Encounter Date: 1/2/2019 Status: Signed    : Rachel Manzano CNP (Nurse Practitioner)          Click to link to [facility]     [facility] HEART CARE ELECTROPHYSIOLOGY NOTE      Assessment/Recommendations   Assessment/Plan:    Diagnoses and all orders for this visit:    Chronic atrial fibrillation (H) likely and in A. fib for at least 6-12 months per changes seen on echo as left atrium severely enlarged and right atrium moderately enlarged and only mild MR and TR.  Short of breath with walking room to room but may be related to poor rate control in A. fib.  Currently on diltiazem 120 mg daily plus metoprolol succinate 100 mg p.o. daily.  To do 24-hour Holter and I will call her with those results.  I discussed rate versus rhythm control is decided on symptoms.  I discussed natural progression and likely would have poor success rate with cardioversion.  If she did cardioversion she would definitely need antiarrhythmic prior.  I would likely start stop diltiazem and metoprolol and start sotalol 80 mg p.o. every 12 hours.  With chronic kidney disease this would be high-dose sotalol for her.  With COPD she is not a good candidate for long-term amiodarone use.  Therefore limited antiarrhythmic options.  I will call her if Dr. Leyva wants her to have a trial of sinus.  Otherwise she agrees with following rate control.  -     Holter monitor - 24 hour; Future; Expected date: 01/02/2019    Gastrointestinal hemorrhage associated with gastric ulcer and AV malformation of GI tract and ulcer history so high predisposition risk for GI bleed on anticoagulation.    Essential hypertension and well-controlled.  [id number] score of 5 with 2 points for age 75 and over and 1 point each for gender, history of hypertension and history of  CAD.   HAS bled score of 3 with one point each for advanced age, history of GI bleeding due to ulceration and AV malformation and need for daily aspirin with CAD.  Tori Do has been advised to stay on chronic Eliquis due to high risk for stroke with history of atrial fib.  Tori Do has  heart failure [place] class 3 and short of breath walking room to room now.    I reviewed watchman protocol with PETEY to define anatomy, watchman procedure itself and post PETEY.  I reviewed anticoagulation protocol with staying on warfarin throughout PETEY for anatomy and watchman procedure as well as 45 days after.  If post PETEY is negative, Tori Do will get a call and then will go to aspirin 81 mg plus Plavix 75 mg by mouth daily.  Tori Do will stay on these 2 medications for 4 months and then will stop plavix and continue aspirin 81 mg orally every day.  I discussed that there is no further testing beyond post PETEY.    After discussion, Tori Do wants to proceed with watchman placement and she will get a call from Gardenia Kan RN when approved for watchman placement.  Due to high stroke risk, I would leave her on anticoagulation until  able to come off per watchman protocol.  Follow up in clinic as planned prior to watchman placement .     History of Present Illness    Ms. Tori Do is a very pleasant 87 y.o. female who comes in today for EP consult regarding newly diagnosed persistent A. fib in November 2018.  Tori Do has a known history of persistent atrial fib and had a cardioversion back in 2015.  She does not recall if she felt better after cardioversion.  She tells me she is not even sure if she has been maintaining sinus rhythm since that cardioversion, but I discussed that I have EKGs back in 2016 showing her to be in sinus rhythm.  She is complaining of more fatigue over the last year but ongoing for at least 2 years.  She is complaining of much more shortness of breath over the last  year and is now short of breath walking room to room.  She sees Dr. Leyva in clinic every 3-4 months.  She has number of other health issues of which  osteoarthritis and fibromyalgia are limiting her activities.  Her medical history is also significant for coronary artery disease with stent over a decade ago and COPD and denies ever being on oxygen.  She stopped smoking in 1983.  She lives in her own home and does cooking and housework.  She recalls that last time of angiogram that Dr. Preciado had difficulty threading  the catheter from her right groin.  When I look in procedure note from October 2008 in [facility], I see right femoral access without any comment regarding difficulty.  Tori is a good historian and comes to clinic visit alone.  She complains of developing severe peripheral edema in both legs when she is on her feet a lot of the day but it resolves overnight.  She has no edema today but tells me she has not been on her feet at all.  She complains of spontaneous bruising and occasional nosebleeds on Eliquis.  She normally follows with Dr. Wong for CAD.    Cardiographics (personally reviewed):  Results for orders placed during the hospital encounter of 11/29/18   Echo Complete [ECH10] 11/29/2018    Addendum 1. Normal left ventricular size and systolic performance with a visually  estimated ejection fraction of 60%.  2. There is mild to moderate tricuspid insufficiency.  3. Normal right ventricular size and systolic performance.  4. There is severe left atrial enlargement.  5. There is moderate right atrial enlargement.   When compared to the prior real-time surface echocardiogram dated 21 April 2014, the degree of mitral insufficiency appears less on the current  study.  The degree of tricuspid insufficiency appears slightly increased  on the current examination.      Xiomara Vo MD 11/29/2018  1:22 PM          Narrative 1. Normal left ventricular size and systolic performance with a visually    estimated ejection fraction of 60%.   2. There is mild to moderate tricuspid insufficiency.   3. Normal right ventricular size and systolic performance.   4. There is severe left atrial enlargement.   5. There is moderate right atrial enlargement.     When compared to the prior real-time surface echocardiogram dated T1 April 2014, the degree of mitral insufficiency appears less on the current   study.  The degree of tricuspid insufficiency appears slightly increased   on the current examination.         Results for orders placed or performed in visit on 12/13/18   ECG Clinic - Today   Result Value Ref Range    SYSTOLIC BLOOD PRESSURE  mmHg    DIASTOLIC BLOOD PRESSURE  mmHg    VENTRICULAR RATE 94 BPM    ATRIAL RATE 96 BPM    P-R INTERVAL  ms    QRS DURATION 112 ms    Q-T INTERVAL 382 ms    QTC CALCULATION (BEZET) 477 ms    P Axis  degrees    R AXIS 2 degrees    T AXIS 260 degrees    MUSE DIAGNOSIS       Atrial fibrillation with premature ventricular or aberrantly conducted complexes  ST & T wave abnormality, consider lateral ischemia or digitalis effect  Prolonged QT  Abnormal ECG  When compared with ECG of 30-OCT-2018 13:27,  Inverted T waves have replaced nonspecific T wave abnormality in Lateral leads  Premature ventricular complexes now present  Confirmed by YOGESH MONTEZ, KRISTA LOC:JN ([id number]) on 12/14/2018 3:33:01 PM            Problem List:  Patient Active Problem List   Diagnosis   ? Essential hypertension   ? Coronary Artery Disease   ? Carotid Atherosclerosis   ? Anemia   ? GI bleeding   ? Chronic atrial fibrillation (H)   ? Gastric ulcer   ? Iron deficiency anemia due to chronic blood loss   ? Fibromyalgia   ? COPD (chronic obstructive pulmonary disease) (H)   ? Hyperlipidemia   ? Ovarian cancer (H)   ? Hypothyroidism   ? AV malformation of GI tract   ? Anxiety       Physical Examination Review of Systems   Vitals:    01/02/19 1030   BP: 122/88   Pulse: 64   Resp: 22     Body mass index is 31.81 kg/m .  Wt  Readings from Last 3 Encounters:   01/02/19 147 lb (66.7 kg)   12/26/18 146 lb (66.2 kg)   12/13/18 143 lb 3.2 oz (65 kg)     General Appearance:   Alert, well-appearing and in no acute distress.   HEENT: Atraumatic, normocephalic.  No scleral icterus, normal conjunctivae; mucous membranes pink and moist.     Chest: Chest symmetric, spine straight.   Lungs:   Respirations unlabored: Lungs are clear to auscultation.   Cardiovascular:   Normal first and second heart sounds with no murmurs, rubs, or gallops.  Irregular, irregular.  Radial and posterior tibial pulses are intact.  Normal JVD, no edema.       Extremities: No cyanosis or clubbing   Musculoskeletal: Moves all extremities   Skin: Warm, dry, intact.    Neurologic: Mood and affect are appropriate, alert and oriented to person, place, time, and situation    General: Weight Gain  Eyes: WNL  Ears/Nose/Throat: Nosebleeds  Lungs: Cough, Shortness of Breath, Wheezing  Heart: Chest Pain, Arm Pain, Shortness of Breath with activity, Irregular Heartbeat, Leg Swelling  Stomach: Constipation, Diarrhea  Bladder: WNL  Muscle/Joints: Joint Pain  Skin: WNL  Nervous System: Dizziness  Mental Health: Anxiety     Blood: Easy Bruising       Medical History  Surgical History Family History Social History   Past Medical History:   Diagnosis Date   ? A-fib (H)    ? Advanced care planning/counseling discussion     DNI   ? Anemia    ? Asthma    ? CAD (coronary artery disease) 2005    stent in 2005   ? Carotid atherosclerosis    ? Carpal tunnel syndrome    ? COPD (chronic obstructive pulmonary disease) (H) 5/7/2015   ? Fibromyalgia    ? GI (gastrointestinal bleed) 5/5/2015    Duodenal ulcer    ? History of transfusion    ? Hyperlipidemia    ? Hypertension    ? Hypothyroidism     Hypothyroid   ? Murmur, cardiac    ? Osteoarthritis    ? Ovarian cancer (H)    ? Paroxysmal atrial fibrillation (H) 2015    S/P Cardioversion    Past Surgical History:   Procedure Laterality Date   ?  APPENDECTOMY     ? BACK SURGERY     ? BILATERAL SALPINGOOPHORECTOMY     ? COLECTOMY      because of stricture with GI bleeding   ? DILATION AND CURETTAGE OF UTERUS      6   ? ESOPHAGOGASTRODUODENOSCOPY N/A 2015    Procedure: ESOPHAGOGASTRODUODENOSCOPY with gastric biopsy;  Surgeon: Maylin Hunter MD;  Location: [facility];  Service:    ? ESOPHAGOGASTRODUODENOSCOPY N/A 2016    Procedure: ESOPHAGOGASTRODUODENOSCOPY with 7french bicap;  Surgeon: Nav Lopez MD;  Location: [facility];  Service:    ? HYSTERECTOMY      total abdominal   ? MD INSERT INTRACORONARY STENT      Description: Cath Stent Placement;  Proc Date: 2005;  Comments: LAD; ; patent at repeat angio     Family History   Problem Relation Age of Onset   ? Colon cancer Mother          age 53   ? Heart disease Father          age 96   ? Asthma Brother     Social History     Socioeconomic History   ? Marital status:      Spouse name: Not on file   ? Number of children: 3   ? Years of education: Not on file   ? Highest education level: Not on file   Social Needs   ? Financial resource strain: Not on file   ? Food insecurity - worry: Not on file   ? Food insecurity - inability: Not on file   ? Transportation needs - medical: Not on file   ? Transportation needs - non-medical: Not on file   Occupational History   ? Not on file   Tobacco Use   ? Smoking status: Former Smoker     Packs/day: 0.50     Years: 15.00     Pack years: 7.50     Types: Cigarettes     Last attempt to quit: 1975     Years since quittin.0   ? Smokeless tobacco: Never Used   Substance and Sexual Activity   ? Alcohol use: No     Comment: denies   ? Drug use: No   ? Sexual activity: No   Other Topics Concern   ? Not on file   Social History Narrative    , lives by herself in a house.  Has 3 children, is DNR. Patients   in .  (last updated 2016)           Medications  Allergies   Current Outpatient Medications    Medication Sig Dispense Refill   ? alendronate (FOSAMAX) 70 MG tablet TAKE ONE TABLET BY MOUTH EVERY 7 DAYS. TAKE IN AM ON EMPTY STOMACH WITH WATER 30MIN B4 FOOD 12 tablet 3   ? apixaban (ELIQUIS) 5 mg Tab tablet Take 1 tablet (5 mg total) by mouth 2 (two) times a day. 60 tablet 2   ? aspirin 81 MG EC tablet Take 81 mg by mouth daily with breakfast.     ? atorvastatin (LIPITOR) 10 MG tablet TAKE ONE TABLET BY MOUTH ONCE DAILY 90 tablet 1   ? busPIRone (BUSPAR) 10 MG tablet TAKE 1 TABLET (10 MG TOTAL) BY MOUTH 3 (THREE) TIMES A DAY. 90 tablet 11   ? busPIRone (BUSPAR) 5 MG tablet 1 tablet 3 times daily as needed for anxiety 60 tablet 0   ? cholecalciferol, vitamin D3, (CHOLECALCIFEROL) 1,000 unit tablet Take 1,000 Units by mouth daily.     ? diltiazem (CARDIZEM CD) 120 MG 24 hr capsule Take 1 capsule (120 mg total) by mouth daily. 30 capsule 11   ? DULoxetine (CYMBALTA) 30 MG capsule TAKE ONE CAPSULE BY MOUTH TWICE A DAY 60 capsule 2   ? furosemide (LASIX) 80 MG tablet TAKE ONE-HALF TAB (40MG) BY MOUTH ONCE DAILY 45 tablet 1   ? levothyroxine (SYNTHROID) 88 MCG tablet Take 1 tablet (88 mcg total) by mouth daily. 100 tablet 3   ? losartan-hydrochlorothiazide (HYZAAR) 100-25 mg per tablet TAKE ONE TABLET BY MOUTH ONCE DAILY 90 tablet 1   ? melatonin 3 mg Tab tablet Take 1 tablet (3 mg total) by mouth bedtime as needed.  0   ? metoprolol succinate (TOPROL XL) 50 MG 24 hr tablet 2 tabs p.o. daily. 60 tablet 3   ? metoprolol succinate (TOPROL-XL) 50 MG 24 hr tablet TAKE ONE TABLET BY MOUTH ONCE DAILY 90 tablet 1   ? montelukast (SINGULAIR) 10 mg tablet TAKE ONE TABLET BY MOUTH ONCE DAILY 30 tablet 0   ? multivitamin therapeutic (THERAGRAN) tablet Take 1 tablet by mouth daily.     ? NITROSTAT 0.4 mg SL tablet Place 0.4 mg under the tongue every 5 (five) minutes as needed for chest pain.      ? omeprazole (PRILOSEC) 20 MG capsule TAKE ONE CAPSULE BY MOUTH TWICE A DAY 90 capsule 3   ? peg 400-propylene glycol (SYSTANE)  0.4-0.3 % Drop Administer 1 drop to both eyes 4 (four) times a day as needed (dry eye).      ? potassium chloride (K-DUR,KLOR-CON) 20 MEQ tablet Take 1 tablet (20 mEq total) by mouth daily. 90 tablet 3   ? traMADol (ULTRAM) 50 mg tablet TAKE 1 TABLET (50 MG TOTAL) BY MOUTH EVERY 8 (EIGHT) HOURS AS NEEDED FOR PAIN. 60 tablet 0   ? VENTOLIN HFA 90 mcg/actuation inhaler INHALE 2 PUFFS BY MOUTH EVERY 6 HOURS AS NEEDED FOR WHEEZING 1 each 5     No current facility-administered medications for this visit.       No Known Allergies   Medical, surgical, family, social history, and medications were all reviewed and updated as necessary.   Lab Results    Chemistry CBC/INR CHOLESTROL   Lab Results   Component Value Date    CREATININE 1.10 12/26/2018    BUN 27 12/26/2018     12/26/2018    K 3.6 12/26/2018    CL 99 12/26/2018    CO2 32 (H) 12/26/2018     Creatinine (mg/dL)   Date Value   12/26/2018 1.10   12/11/2018 1.00   11/13/2018 1.22 (H)   10/30/2018 0.95     Lab Results   Component Value Date     (H) 05/13/2016    Lab Results   Component Value Date    WBC 6.5 12/26/2018    HGB 13.1 12/26/2018    HCT 39.6 12/26/2018    MCV 99 12/26/2018     12/26/2018     Lab Results   Component Value Date    INR 0.96 04/17/2016      Lab Results   Component Value Date    CHOL 161 07/17/2018    HDL 71 07/17/2018    LDLCALC 69 07/17/2018    TRIG 103 07/17/2018          Total Time- 60 minutes with greater than 50% spent talking to patient and family regarding patient's relevant diagnoses.  This note has been dictated using voice recognition software. Any grammatical, typographical, or context distortions are unintentional and inherent to the software.    Rachel Manzano RN,  [facility]   Electrophysiology  [phone]

## 2021-06-29 NOTE — PROGRESS NOTES
"Progress Notes by Rachel Manzano CNP at 6/4/2020  9:50 AM     Author: Rachel Manzano CNP Service: -- Author Type: Nurse Practitioner    Filed: 6/4/2020 12:17 PM Encounter Date: 6/4/2020 Status: Signed    : Rachel Manzano CNP (Nurse Practitioner)       The patient has been notified of following:     \"This telephone visit will be conducted via a call between you and your physician/provider. We have found that certain health care needs can be provided without the need for a physical exam.  This service lets us provide the care you need with a phone conversation.  If a prescription is necessary we can send it directly to your pharmacy.  If lab work is needed we can place an order for that and you can then stop by our lab to have the test done at a later time. If during the course of the call the physician/provider feels a telephone visit is not appropriate, you will not be charged for this service.\"         HEART CARE PHONE ENCOUNTER        Appointment is being conducted as a telephone visit, to reduce risk of exposure given the current status of Coronovirus in our community. This telephone visit is being conducted via a call between the patient and physician/provider. Health care needs are being provided without a physical exam.     Assessment/Recommendations   Assessment & PLAN:     Diagnoses and all orders for this visit:    Chronic atrial fibrillation and on rate control.  Needs metoprolol succinate 100 mg plus diltiazem 180 mg for rate control at a minimum.  To call if lightheaded or dizzy with med change.  -     metoprolol succinate (TOPROL-XL) 50 MG 24 hr tablet; Take 2 tablets (100 mg total) by mouth at bedtime.  Dispense: 180 tablet; Refill: 3  -     apixaban ANTICOAGULANT (ELIQUIS) 2.5 mg Tab tablet; Take 1 tablet (2.5 mg total) by mouth every 12 (twelve) hours.  Dispense: 180 tablet; Refill: 3    Atherosclerosis of native coronary artery of native heart without angina pectoris and " no anginal symptoms.  I believe shortness of breath is pulmonary related with history of COPD.  No other symptoms of COVID-19.  I discussed that she needs to go through primary clinic to get prednisone prescription which is what she is requesting today.  To take furosemide 80 mg an extra half a tablet today to see if this helps with shortness of breath and call primary clinic if not better tomorrow.  She acknowledges that she is using inhaler more frequently than what is prescribed.    Essential hypertension and unable to evaluate via phone visit.    Other orders  -     naproxen sodium (ALEVE) 220 MG tablet; Take 220 mg by mouth 2 (two) times a day with meals.    [id number] score of 5 and states would never consider watchman device.  Due to low body weight and advanced age she qualifies for Eliquis 2.5 mg p.o. every 12 hours.  She will split her 5 mg tablets and then I will order 2.5 mg so she does not have to split them when she gets a refill.  Discussed Eliquis will go generic within the next year.  Follow up in clinic with me in 1 year.  Total time of call between patient and provider was 20 minutes .  Start time 0940 and end time 1000.       History of Present Illness/Subjective    Tori Do is a 89 y.o. female who is being evaluated via a billable telephone visit.    Tori Do has a known history of persistent atrial fib and had a cardioversion back in 2015.  She does not recall if she felt better after cardioversion.  She was switched to rate control for permanent atrial fibrillation in 2019.  She has number of other health issues of osteoarthritis and fibromyalgia which limit her activities.  Her medical history is also significant for coronary artery disease with stent over a decade ago and COPD and denies ever being on oxygen.  She stopped smoking in 1983.  She lives in her own home and does cooking and housework.   When I saw Tori last year she had a GI bleed and I recommended watchman device so  she can get off of chronic anticoagulation.  When they tried to place the watchman device she developed a large right groin hematoma and device placement was aborted.  She has remained on anticoagulation of Eliquis.  Then in December 2019 she had a mechanical fall and 6 days later was developing significant hematoma in her neck and head area so her daughter took her to the hospital.  She was admitted for subdural hematoma.  Fortunately no neurosurgery was needed as no intracranial bleeding.  She was discharged home.  She reports that her head is still sore on a few spots and has some small areas of hematoma from this fall over 6 months ago.  She has had not had any further falls.  She complains of occasional chest pain in the center of her chest or between her shoulder blades.  It can come on with rest or with activity and lasts at most 2 minutes.  I assured her this was not anginal type pain.  She has had unintentional weight loss over the last 6 months.  Her weight is 130-135 and this agrees with primary clinic weight.  Tori is complaining of shortness of breath.  She denies any fever or chills.  She has severe arthritis.  She is requesting prednisone.  She is using albuterol inhaler about every 3 hours.  Shortness of breath is definitely worse when hot and humid as it is today.  She is in air conditioning the last few days and is not going outside.  I have reviewed and updated the patient's Past Medical History, Social History, Family History and Medication List.   Cardiographics reviewed in prep for this visit:  November 2018 echo shows EF 60%.  Mild to moderate TR.  Severe left atrial enlargement.  Moderate right atrial enlargement.  Less mitral insufficiency compared to previous study in April 2014.  April 2019 echo shows EF 70%.  No valve structures evaluated.  February 2019 24-hour Holter shows A. fib throughout with average ventricular response of 87.  Ventricular response range 45-1 4 1.  Patient reports  being on metoprolol succinate 150 mg daily and I increased diltiazem from 120 to 180 mg post Holter.  Physical Examination not perform given phone encounter Review of Systems                                                Medical History  Surgical History Family History Social History   Past Medical History:   Diagnosis Date   ? Anemia    ? Asthma    ? Carpal tunnel syndrome    ? Fibromyalgia    ? History of transfusion    ? Hyperlipidemia    ? Hypothyroidism     Hypothyroid   ? Ovarian cancer (H)     Past Surgical History:   Procedure Laterality Date   ? APPENDECTOMY     ? BACK SURGERY     ? BILATERAL SALPINGOOPHORECTOMY     ? COLECTOMY      because of stricture with GI bleeding   ? DILATION AND CURETTAGE OF UTERUS      6   ? ESOPHAGOGASTRODUODENOSCOPY N/A 2015    Procedure: ESOPHAGOGASTRODUODENOSCOPY with gastric biopsy;  Surgeon: Maylin Hunter MD;  Location: [facility];  Service:    ? ESOPHAGOGASTRODUODENOSCOPY N/A 2016    Procedure: ESOPHAGOGASTRODUODENOSCOPY with 7french bicap;  Surgeon: Nav Lopez MD;  Location: [facility];  Service:    ? HYSTERECTOMY      total abdominal   ? SD INSERT INTRACORONARY STENT      Description: Cath Stent Placement;  Proc Date: 2005;  Comments: LAD; ; patent at repeat angio     Family History   Problem Relation Age of Onset   ? Colon cancer Mother          age 53   ? Heart disease Father          age 96   ? Asthma Brother     Social History     Socioeconomic History   ? Marital status:      Spouse name: Not on file   ? Number of children: 3   ? Years of education: Not on file   ? Highest education level: Not on file   Occupational History   ? Not on file   Social Needs   ? Financial resource strain: Not on file   ? Food insecurity     Worry: Not on file     Inability: Not on file   ? Transportation needs     Medical: Not on file     Non-medical: Not on file   Tobacco Use   ? Smoking status: Former Smoker     Packs/day: 0.50      Years: 15.00     Pack years: 7.50     Types: Cigarettes     Last attempt to quit: 1975     Years since quittin.4   ? Smokeless tobacco: Never Used   Substance and Sexual Activity   ? Alcohol use: No     Comment: denies   ? Drug use: No   ? Sexual activity: Never   Lifestyle   ? Physical activity     Days per week: Not on file     Minutes per session: Not on file   ? Stress: Not on file   Relationships   ? Social connections     Talks on phone: Not on file     Gets together: Not on file     Attends Confucianism service: Not on file     Active member of club or organization: Not on file     Attends meetings of clubs or organizations: Not on file     Relationship status: Not on file   ? Intimate partner violence     Fear of current or ex partner: Not on file     Emotionally abused: Not on file     Physically abused: Not on file     Forced sexual activity: Not on file   Other Topics Concern   ? Not on file   Social History Narrative    , lives by herself in a house.  Has 3 children, is DNR. Patients   in .  (last updated 2016)           Medications  Allergies   Current Outpatient Medications   Medication Sig Dispense Refill   ? albuterol (PROAIR HFA;PROVENTIL HFA;VENTOLIN HFA) 90 mcg/actuation inhaler INHALE 2 PUFFS BY MOUTH EVERY 6 HOURS AS NEEDED FOR WHEEZING 54 each 0   ? atorvastatin (LIPITOR) 10 MG tablet TAKE ONE TABLET BY MOUTH ONCE DAILY 90 tablet 2   ? busPIRone (BUSPAR) 5 MG tablet TAKE TWO TABLETS (10 MG) BY MOUTH 3 TIMES DAILY 540 tablet 3   ? cholecalciferol, vitamin D3, (CHOLECALCIFEROL) 1,000 unit tablet Take 1,000 Units by mouth daily.     ? diltiazem (CARDIZEM CD) 180 MG 24 hr capsule TAKE ONE CAPSULE (180 MG) BY MOUTH ONCE DAILY 90 capsule 0   ? furosemide (LASIX) 80 MG tablet TAKE ONE-HALF TAB (40MG) BY MOUTH ONCE DAILY 45 tablet 2   ? hydroCHLOROthiazide (HYDRODIURIL) 25 MG tablet TAKE ONE TABLET BY MOUTH ONCE DAILY ALONG WITH LOSARTAN 100MG TAB 90 tablet 3   ?  levothyroxine (SYNTHROID, LEVOTHROID) 88 MCG tablet Take 1 tablet (88 mcg total) by mouth daily. 90 tablet 3   ? losartan (COZAAR) 100 MG tablet TAKE ONE TABLET BY MOUTH ONCE DAILY (ALONG WITH HCTZ 25MG TAB) 90 tablet 3   ? melatonin 3 mg Tab tablet Take 1 tablet (3 mg total) by mouth bedtime as needed.  0   ? metoprolol succinate (TOPROL-XL) 50 MG 24 hr tablet Take 2 tablets (100 mg total) by mouth at bedtime. 180 tablet 3   ? montelukast (SINGULAIR) 10 mg tablet Take 10 mg by mouth at bedtime as needed.      ? multivitamin therapeutic (THERAGRAN) tablet Take 1 tablet by mouth daily.     ? naproxen sodium (ALEVE) 220 MG tablet Take 220 mg by mouth 2 (two) times a day with meals.     ? NITROSTAT 0.4 mg SL tablet Place 0.4 mg under the tongue every 5 (five) minutes as needed for chest pain.      ? omeprazole (PRILOSEC) 20 MG capsule Take 1 capsule (20 mg total) by mouth 2 (two) times a day before meals. 180 capsule 3   ? peg 400-propylene glycol (SYSTANE) 0.4-0.3 % Drop Administer 1 drop to both eyes 4 (four) times a day as needed (dry eye).      ? potassium chloride (K-DUR,KLOR-CON) 20 MEQ tablet TAKE ONE TABLET BY MOUTH ONCE DAILY 90 tablet 3   ? traMADoL (ULTRAM) 50 mg tablet TAKE 1 TABLET (50 MG TOTAL) BY MOUTH 2 (TWO) TIMES A DAY AS NEEDED FOR PAIN. 60 tablet 0   ? UNABLE TO FIND Take 2 capsules by mouth daily. Med Name: Omega XL ([employer] endorsed product)     ? apixaban ANTICOAGULANT (ELIQUIS) 2.5 mg Tab tablet Take 1 tablet (2.5 mg total) by mouth every 12 (twelve) hours. 180 tablet 3     No current facility-administered medications for this visit.     No Known Allergies      Lab Results    Chemistry/lipid CBC Cardiac Enzymes/BNP/TSH/INR   Lab Results   Component Value Date    CHOL 153 07/15/2019    HDL 71 07/15/2019    LDLCALC 62 07/15/2019    TRIG 100 07/15/2019    CREATININE 1.25 (H) 12/27/2019    CREATININE 1.21 (H) 12/27/2019    BUN 21 12/27/2019    BUN 20 12/27/2019    K 4.3 12/27/2019    K 4.3  12/27/2019     12/27/2019     12/27/2019     12/27/2019     12/27/2019    CO2 26 12/27/2019    CO2 26 12/27/2019    Lab Results   Component Value Date    WBC 9.0 12/27/2019    WBC 8.5 12/27/2019    HGB 11.1 (L) 12/27/2019    HGB 11.1 (L) 12/27/2019    HCT 34.1 (L) 12/27/2019    HCT 34.3 (L) 12/27/2019     (H) 12/27/2019     (H) 12/27/2019     12/27/2019     12/27/2019    Lab Results   Component Value Date    CKMB 2 04/21/2014    TROPONINI 0.10 04/04/2019     (H) 05/13/2016    TSH 2.43 02/04/2019    INR 1.49 (H) 12/27/2019        Rachel Manzano

## 2021-07-03 NOTE — ADDENDUM NOTE
Addendum Note by Nba Mahan MD at 12/18/2020 11:20 AM     Author: Nba Mahan MD Service: -- Author Type: Physician    Filed: 12/18/2020 12:30 PM Encounter Date: 12/18/2020 Status: Signed    : Nba Mahan MD (Physician)    Addended by: NBA MAHAN on: 12/18/2020 12:30 PM        Modules accepted: Orders

## 2021-07-14 PROBLEM — S06.5XAA SDH (SUBDURAL HEMATOMA) (H): Status: RESOLVED | Noted: 2019-12-27 | Resolved: 2020-06-04

## 2023-04-13 NOTE — TELEPHONE ENCOUNTER
RN cannot approve Refill Request: Radah    RN can NOT refill this medication med is not covered by policy/route to provider. Last office visit: 12/26/2018 Master Leyva MD Last Physical: Visit date not found Last MTM visit: Visit date not found Last visit same specialty: 12/26/2018 Master Leyva MD.  Next visit within 3 mo: Visit date not found  Next physical within 3 mo: Visit date not found      [facility] Triage/Med Refill 1/24/2019    Requested Prescriptions   Pending Prescriptions Disp Refills     ELIQUIS 5 mg Tab tablet [Pharmacy Med Name: ELIQUIS 5MG TAB TABLET] 60 tablet 2     Sig: TAKE 1 TABLET (5 MG TOTAL) BY MOUTH 2 (TWO) TIMES A DAY.    Apixaban/Rivaroxaban/Dabigatran Refill Protocol Failed - 1/22/2019  2:18 PM       Failed - Route to appropriate pool/provider    Last Anticoagulation Summary:          Passed - Renal function test in last year    Creatinine   Date Value Ref Range Status   12/26/2018 1.10 0.60 - 1.10 mg/dL Final            Passed - PCP or prescribing provider visit in past 12 months      Last office visit with prescriber/PCP: 12/26/2018 Master Leyva MD OR same dept: 12/26/2018 Master Leyva MD OR same specialty: 12/26/2018 Master Leyva MD  Last physical: Visit date not found Last MTM visit: Visit date not found   Next visit within 3 mo: Visit date not found  Next physical within 3 mo: Visit date not found  Prescriber OR PCP: Master Leyva MD  Last diagnosis associated with med order: 1. Atrial fibrillation -- S/P cardioversion 2015  - ELIQUIS 5 mg Tab tablet [Pharmacy Med Name: ELIQUIS 5MG TAB TABLET]; TAKE 1 TABLET (5 MG TOTAL) BY MOUTH 2 (TWO) TIMES A DAY.  Dispense: 60 tablet; Refill: 2    If protocol passes may refill for 12 months if within 3 months of last provider visit (or a total of 15 months).             levothyroxine (SYNTHROID, LEVOTHROID) 88 MCG tablet [Pharmacy Med Name: LEVOTHYROXINE 88MCG TAB TABLET] 100 tablet 3      Sig: TAKE ONE TABLET BY MOUTH ONCE DAILY    Thyroid Hormones Protocol Passed - 1/22/2019  2:18 PM       Passed - Provider visit in past 12 months or next 3 months    Last office visit with prescriber/PCP: 12/26/2018 Master Leyva MD OR same dept: 12/26/2018 Master Leyva MD OR same specialty: 12/26/2018 Master Leyva MD  Last physical: Visit date not found Last MTM visit: Visit date not found   Next visit within 3 mo: Visit date not found  Next physical within 3 mo: Visit date not found  Prescriber OR PCP: Master Leyva MD  Last diagnosis associated with med order: 1. Atrial fibrillation -- S/P cardioversion 2015  - ELIQUIS 5 mg Tab tablet [Pharmacy Med Name: ELIQUIS 5MG TAB TABLET]; TAKE 1 TABLET (5 MG TOTAL) BY MOUTH 2 (TWO) TIMES A DAY.  Dispense: 60 tablet; Refill: 2    If protocol passes may refill for 12 months if within 3 months of last provider visit (or a total of 15 months).            Passed - TSH on file in past 12 months for patient age 12 & older    TSH   Date Value Ref Range Status   10/30/2018 0.74 0.30 - 5.00 uIU/mL Final                   traMADol (ULTRAM) 50 mg tablet [Pharmacy Med Name: TRAMADOL HCL 50MG TAB TABLET] 60 tablet      Sig: TAKE 1 TABLET (50 MG TOTAL) BY MOUTH EVERY 8 (EIGHT) HOURS AS NEEDED FOR PAIN.    Controlled Substances Refill Protocol Failed - 1/22/2019  2:18 PM       Failed - Route all Controlled Substance Requests to Provider       Failed - Patient has controlled substance agreement in past 12 months    Encounter-Level CSA Scan Date:    There are no encounter-level csa scan date.              Passed - Visit with PCP or prescribing provider visit in past 12 months     Last office visit with prescriber/PCP: 12/26/2018 Master Leyva MD OR same dept: 12/26/2018 Master Leyva MD OR same specialty: 12/26/2018 Master Leyva MD Last physical: Visit date not found Last MTM visit: Visit date not found    Next visit within 3 mo: Visit date not found   Next physical within 3 mo: Visit date not found  Prescriber OR PCP: Master Leyva MD  Last diagnosis associated with med order: 1. Atrial fibrillation -- S/P cardioversion 2015  - ELIQUIS 5 mg Tab tablet [Pharmacy Med Name: ELIQUIS 5MG TAB TABLET]; TAKE 1 TABLET (5 MG TOTAL) BY MOUTH 2 (TWO) TIMES A DAY.  Dispense: 60 tablet; Refill: 2                   Purse String (Simple) Text: Given the location of the defect and the characteristics of the surrounding skin a purse string closure was deemed most appropriate.  Undermining was performed circumfirentially around the surgical defect.  A purse string suture was then placed and tightened.